# Patient Record
Sex: MALE | Race: BLACK OR AFRICAN AMERICAN | Employment: OTHER | URBAN - METROPOLITAN AREA
[De-identification: names, ages, dates, MRNs, and addresses within clinical notes are randomized per-mention and may not be internally consistent; named-entity substitution may affect disease eponyms.]

---

## 2017-02-09 ENCOUNTER — HOSPITAL ENCOUNTER (EMERGENCY)
Age: 67
Discharge: PSYCHIATRIC OR SUBSTANCE USE FACILITY | End: 2017-02-10
Attending: EMERGENCY MEDICINE
Payer: MEDICARE

## 2017-02-09 ENCOUNTER — APPOINTMENT (OUTPATIENT)
Dept: GENERAL RADIOLOGY | Age: 67
End: 2017-02-09
Attending: NURSE PRACTITIONER
Payer: MEDICARE

## 2017-02-09 DIAGNOSIS — J20.9 ACUTE BRONCHITIS WITH BRONCHOSPASM: ICD-10-CM

## 2017-02-09 DIAGNOSIS — N28.9 RENAL INSUFFICIENCY: ICD-10-CM

## 2017-02-09 DIAGNOSIS — R45.851 SUICIDAL IDEATION: ICD-10-CM

## 2017-02-09 DIAGNOSIS — F10.929 ACUTE ALCOHOL INTOXICATION, WITH UNSPECIFIED COMPLICATION (HCC): Primary | ICD-10-CM

## 2017-02-09 DIAGNOSIS — F14.10 COCAINE ABUSE (HCC): ICD-10-CM

## 2017-02-09 DIAGNOSIS — N39.0 URINARY TRACT INFECTION, SITE UNSPECIFIED: ICD-10-CM

## 2017-02-09 PROBLEM — I10 ESSENTIAL HYPERTENSION: Status: ACTIVE | Noted: 2017-02-09

## 2017-02-09 PROBLEM — E55.9 VITAMIN D DEFICIENCY: Status: ACTIVE | Noted: 2017-02-09

## 2017-02-09 LAB
AMPHET UR QL SCN: NEGATIVE
ANION GAP BLD CALC-SCNC: 11 MMOL/L (ref 3–18)
APAP SERPL-MCNC: <2 UG/ML (ref 10–30)
APPEARANCE UR: CLEAR
BACTERIA URNS QL MICRO: ABNORMAL /HPF
BARBITURATES UR QL SCN: NEGATIVE
BASOPHILS # BLD AUTO: 0 K/UL (ref 0–0.06)
BASOPHILS # BLD: 0 % (ref 0–2)
BENZODIAZ UR QL: NEGATIVE
BILIRUB UR QL: NEGATIVE
BUN SERPL-MCNC: 12 MG/DL (ref 7–18)
BUN/CREAT SERPL: 9 (ref 12–20)
CALCIUM SERPL-MCNC: 8.5 MG/DL (ref 8.5–10.1)
CANNABINOIDS UR QL SCN: NEGATIVE
CHLORIDE SERPL-SCNC: 106 MMOL/L (ref 100–108)
CO2 SERPL-SCNC: 25 MMOL/L (ref 21–32)
COCAINE UR QL SCN: POSITIVE
COLOR UR: ABNORMAL
CREAT SERPL-MCNC: 1.39 MG/DL (ref 0.6–1.3)
DIFFERENTIAL METHOD BLD: ABNORMAL
EOSINOPHIL # BLD: 0.3 K/UL (ref 0–0.4)
EOSINOPHIL NFR BLD: 3 % (ref 0–5)
EPITH CASTS URNS QL MICRO: ABNORMAL /LPF (ref 0–5)
ERYTHROCYTE [DISTWIDTH] IN BLOOD BY AUTOMATED COUNT: 15.7 % (ref 11.6–14.5)
ETHANOL SERPL-MCNC: 119 MG/DL (ref 0–3)
GLUCOSE SERPL-MCNC: 85 MG/DL (ref 74–99)
GLUCOSE UR STRIP.AUTO-MCNC: NEGATIVE MG/DL
HCT VFR BLD AUTO: 46.6 % (ref 36–48)
HDSCOM,HDSCOM: ABNORMAL
HGB BLD-MCNC: 15.5 G/DL (ref 13–16)
HGB UR QL STRIP: NEGATIVE
KETONES UR QL STRIP.AUTO: NEGATIVE MG/DL
LEUKOCYTE ESTERASE UR QL STRIP.AUTO: ABNORMAL
LYMPHOCYTES # BLD AUTO: 24 % (ref 21–52)
LYMPHOCYTES # BLD: 2 K/UL (ref 0.9–3.6)
MCH RBC QN AUTO: 29.2 PG (ref 24–34)
MCHC RBC AUTO-ENTMCNC: 33.3 G/DL (ref 31–37)
MCV RBC AUTO: 87.9 FL (ref 74–97)
METHADONE UR QL: NEGATIVE
MONOCYTES # BLD: 0.6 K/UL (ref 0.05–1.2)
MONOCYTES NFR BLD AUTO: 8 % (ref 3–10)
MUCOUS THREADS URNS QL MICRO: ABNORMAL /LPF
NEUTS SEG # BLD: 5.3 K/UL (ref 1.8–8)
NEUTS SEG NFR BLD AUTO: 65 % (ref 40–73)
NITRITE UR QL STRIP.AUTO: NEGATIVE
OPIATES UR QL: NEGATIVE
PCP UR QL: NEGATIVE
PH UR STRIP: 5 [PH] (ref 5–8)
PLATELET # BLD AUTO: 169 K/UL (ref 135–420)
PMV BLD AUTO: 9.7 FL (ref 9.2–11.8)
POTASSIUM SERPL-SCNC: 4 MMOL/L (ref 3.5–5.5)
PROT UR STRIP-MCNC: NEGATIVE MG/DL
RBC # BLD AUTO: 5.3 M/UL (ref 4.7–5.5)
RBC #/AREA URNS HPF: 0 /HPF (ref 0–5)
SALICYLATES SERPL-MCNC: <2.8 MG/DL (ref 2.8–20)
SODIUM SERPL-SCNC: 142 MMOL/L (ref 136–145)
SP GR UR REFRACTOMETRY: 1.03 (ref 1–1.03)
UROBILINOGEN UR QL STRIP.AUTO: 1 EU/DL (ref 0.2–1)
WBC # BLD AUTO: 8.2 K/UL (ref 4.6–13.2)
WBC URNS QL MICRO: ABNORMAL /HPF (ref 0–4)

## 2017-02-09 PROCEDURE — 99285 EMERGENCY DEPT VISIT HI MDM: CPT

## 2017-02-09 PROCEDURE — 71020 XR CHEST PA LAT: CPT

## 2017-02-09 PROCEDURE — 80307 DRUG TEST PRSMV CHEM ANLYZR: CPT | Performed by: EMERGENCY MEDICINE

## 2017-02-09 PROCEDURE — 87086 URINE CULTURE/COLONY COUNT: CPT | Performed by: PHYSICIAN ASSISTANT

## 2017-02-09 PROCEDURE — 85025 COMPLETE CBC W/AUTO DIFF WBC: CPT | Performed by: EMERGENCY MEDICINE

## 2017-02-09 PROCEDURE — 87186 SC STD MICRODIL/AGAR DIL: CPT | Performed by: PHYSICIAN ASSISTANT

## 2017-02-09 PROCEDURE — 74011250637 HC RX REV CODE- 250/637: Performed by: NURSE PRACTITIONER

## 2017-02-09 PROCEDURE — 94640 AIRWAY INHALATION TREATMENT: CPT

## 2017-02-09 PROCEDURE — 81001 URINALYSIS AUTO W/SCOPE: CPT | Performed by: EMERGENCY MEDICINE

## 2017-02-09 PROCEDURE — 80048 BASIC METABOLIC PNL TOTAL CA: CPT | Performed by: EMERGENCY MEDICINE

## 2017-02-09 PROCEDURE — 87077 CULTURE AEROBIC IDENTIFY: CPT | Performed by: PHYSICIAN ASSISTANT

## 2017-02-09 RX ORDER — IPRATROPIUM BROMIDE AND ALBUTEROL SULFATE 2.5; .5 MG/3ML; MG/3ML
3 SOLUTION RESPIRATORY (INHALATION)
Status: DISPENSED | OUTPATIENT
Start: 2017-02-09 | End: 2017-02-10

## 2017-02-09 RX ORDER — ARIPIPRAZOLE 20 MG/1
0.5 TABLET ORAL DAILY
COMMUNITY
Start: 2017-02-01 | End: 2017-02-21

## 2017-02-09 RX ORDER — LEVOFLOXACIN 500 MG/1
500 TABLET, FILM COATED ORAL
Status: COMPLETED | OUTPATIENT
Start: 2017-02-09 | End: 2017-02-09

## 2017-02-09 RX ORDER — BUPROPION HYDROCHLORIDE 150 MG/1
1 TABLET, EXTENDED RELEASE ORAL 2 TIMES DAILY
COMMUNITY
Start: 2016-11-02 | End: 2017-02-21

## 2017-02-09 RX ORDER — HYDROXYZINE PAMOATE 25 MG/1
1 CAPSULE ORAL 2 TIMES DAILY
COMMUNITY
Start: 2017-02-01 | End: 2017-02-21

## 2017-02-09 RX ORDER — IPRATROPIUM BROMIDE AND ALBUTEROL SULFATE 2.5; .5 MG/3ML; MG/3ML
SOLUTION RESPIRATORY (INHALATION)
Status: DISPENSED
Start: 2017-02-09 | End: 2017-02-10

## 2017-02-09 RX ORDER — OMEPRAZOLE 20 MG/1
1 CAPSULE, DELAYED RELEASE ORAL 2 TIMES DAILY
COMMUNITY
Start: 2017-02-01

## 2017-02-09 RX ORDER — DOCUSATE SODIUM 100 MG
1 CAPSULE ORAL DAILY
COMMUNITY
Start: 2017-02-01 | End: 2021-12-12

## 2017-02-09 RX ORDER — TIOTROPIUM BROMIDE 18 UG/1
1 CAPSULE ORAL; RESPIRATORY (INHALATION) DAILY
COMMUNITY
Start: 2017-02-01

## 2017-02-09 RX ADMIN — LEVOFLOXACIN 500 MG: 500 TABLET, FILM COATED ORAL at 20:06

## 2017-02-09 NOTE — CONSULTS
Name: Irma Bruner  Date: 17  Time: 4:35p  : 1950    Chief Complaint: SI with plan to jump in front of traffic    History of Present Illness: 76 y/o male with a h/o depression and crack cocaine abuse who presents with increased depression and SI with plan in the context of multiple psychosocial stressors including the deaths of multiple people close to him, becoming homeless, and crack cocaine abuse. On exam, patient is hopeless, helpless, and suicidal.     SI/HI/Self harm: no h/o attempt    Violence: denied    Access to weapons: denied    Legal: h/o jailed for assault - patient denies    Psychiatric History/Treatment History:  3-4 prior admissions for SI    Drug/Alcohol History: crack cocaine - every day    Medical History: COPD, emphysema    Medications & Freq: off meds     Allergies: Shellfish    Family Psych History/History of suicide: denied    Social History: homeless    Employment: retired   Education: 9th - couldnt comprehend   Stressors: severe - homeless, substance abuse, medical    Mental Status Exam:   Appearance and attire: disheveled  Attitude and behavior: cooperative  Speech: raspy  Affect and mood: depressed, hopeless, helpless  Association and thought processes: organized  Thought content: SI with plan to jump in front of traffic, denies HI, no delusions   Perception: AH - mumbled voices  Sensorium, memory, and orientation: awake and alert  Intellectual functioning: average  Insight and judgment: impaired    Impression/Risk Assessment: 76 y/o male with a h/o depression and crack cocaine abuse who presents with increased depression and SI with plan in the context of multiple psychosocial stressors including the deaths of multiple people close to him, becoming homeless, and crack cocaine abuse. Patient requires inpatient psych admission for safety and stabilization.     Diagnosis: MDD severe recurrent with psychotic features; cocaine use disorder, severe    Treatment Recommendations: voluntary inpatient psych     Pharmacological: confirm and restart home meds    Therapy: supportive, substance abuse focused    Level of Care: inpatient

## 2017-02-09 NOTE — ED PROVIDER NOTES
HPI Comments: Cecilia Hamm is a 77year old male who presents to the ED with a c/o suicidal ideation. Pt states he has lost a lot of family members over the past three years. Reports thoughts of over dosing as well as stepping out in front of a moving train or automobile. Pt has not had previous suicidal attempts. He has a psychiatric diagnosis of depression, and has been in rehab for crack cocaine use in the past.  He would like to be referred to a detox facility for cocaine. He is homeless, has not had anythig to eat or drink in the past 3 days. He has no medication, but is supposed to be taking Wellbutrin and Pradaxa (from a DVT/PE years ago). He hears auditory hallucinations from dead family members. He also has two day history of cough with brown sputum and a subjective fever. Patient is a 77 y.o. male presenting with suicidal ideation. The history is provided by the patient. History limited by: no communication barrier. Suicidal   This is a new problem. The current episode started more than 1 week ago. The problem has not changed (suicidal ideation for the past three years, worsening over the past year.  ) since onset. There was no focality noted. Pertinent negatives include no shortness of breath, no chest pain, no vomiting, no headaches and no nausea. Past Medical History:   Diagnosis Date    Alcohol abuse     Anxiety     Asthma     Bronchitis     Chronic deep vein thrombosis (DVT) ([facility] [address] 07/2013 07/2013, 12/2014 ([facility])    Cocaine abuse     COPD     Depression     GERD (gastroesophageal reflux disease)     Hyperlipidemia     Hypertension     Major depression     Mild diastolic dysfunction     NEIL on CPAP     Renal insufficiency     Suicidal ideation     Vitamin D deficiency        History reviewed. No pertinent past surgical history. History reviewed. No pertinent family history.     Social History     Social History    Marital status: SINGLE     Spouse name: N/A    Number of children: N/A    Years of education: N/A     Occupational History    Not on file. Social History Main Topics    Smoking status: Former Smoker    Smokeless tobacco: Not on file      Comment: States that he quit 12 years ago.  Alcohol use Yes    Drug use: Yes     Special: Cocaine    Sexual activity: Not on file     Other Topics Concern    Not on file     Social History Narrative         ALLERGIES: Shellfish derived    Review of Systems   Constitutional: Negative for chills. Subjective fever     HENT: Negative for ear pain and sore throat. Eyes: Negative for pain and discharge. Respiratory: Positive for cough. Negative for chest tightness, shortness of breath and wheezing. Cardiovascular: Negative for chest pain, palpitations and leg swelling. Gastrointestinal: Negative for abdominal pain, diarrhea, nausea and vomiting. Genitourinary: Negative for dysuria, frequency and urgency. Musculoskeletal: Negative for arthralgias and joint swelling. Skin: Negative for color change, pallor, rash and wound. Neurological: Negative for dizziness, syncope, weakness, light-headedness and headaches. Psychiatric/Behavioral: Positive for dysphoric mood and suicidal ideas. Negative for behavioral problems, hallucinations, self-injury and sleep disturbance. The patient is not nervous/anxious. Vitals:    02/09/17 1253   BP: 109/73   Pulse: 98   Resp: 18   Temp: 98.5 °F (36.9 °C)   SpO2: 97%   Weight: 89.8 kg (198 lb)            Physical Exam   Constitutional: He is oriented to person, place, and time. He appears well-developed and well-nourished. No distress. HENT:   Head: Normocephalic and atraumatic. Right Ear: Hearing, tympanic membrane, external ear and ear canal normal.   Left Ear: Hearing, tympanic membrane, external ear and ear canal normal.   Nose: Nose normal. No mucosal edema, rhinorrhea or sinus tenderness.  Right sinus exhibits no maxillary sinus tenderness and no frontal sinus tenderness. Left sinus exhibits no maxillary sinus tenderness and no frontal sinus tenderness. Mouth/Throat: Uvula is midline, oropharynx is clear and moist and mucous membranes are normal. No trismus in the jaw. No uvula swelling. No oropharyngeal exudate, posterior oropharyngeal edema, posterior oropharyngeal erythema or tonsillar abscesses. Eyes: Conjunctivae and EOM are normal. Pupils are equal, round, and reactive to light. Right eye exhibits no discharge. Left eye exhibits no discharge. No scleral icterus. Neck: Trachea normal, normal range of motion, full passive range of motion without pain and phonation normal. Neck supple. No tracheal tenderness, no spinous process tenderness and no muscular tenderness present. No rigidity. No tracheal deviation, no edema, no erythema and normal range of motion present. No thyroid mass and no thyromegaly present. Cardiovascular: Normal rate, regular rhythm and normal heart sounds. Exam reveals no gallop and no friction rub. No murmur heard. Pulmonary/Chest: Effort normal. No accessory muscle usage or stridor. No tachypnea. No respiratory distress. He has no decreased breath sounds. He has wheezes (expiratory). He has no rhonchi. He has no rales. He exhibits no tenderness. Pt does not appear SOB. Abdominal: Soft. Bowel sounds are normal. There is no tenderness. There is no rigidity, no rebound, no guarding, no CVA tenderness, no tenderness at McBurney's point and negative Feldman's sign. Genitourinary:   Genitourinary Comments: NE   Musculoskeletal: Normal range of motion. Lymphadenopathy:     He has no cervical adenopathy. Neurological: He is alert and oriented to person, place, and time. He has normal strength. He is not disoriented. No cranial nerve deficit or sensory deficit. He exhibits normal muscle tone. Coordination normal.   MS 5/5 BUE/BLE   Skin: Skin is warm, dry and intact. No rash noted.  He is not diaphoretic. No cyanosis. No pallor. Psychiatric: He has a normal mood and affect. His behavior is normal.   Pt thought process is askew in that he wants to harm himself. Nursing note and vitals reviewed. MDM  Number of Diagnoses or Management Options  Acute alcohol intoxication, with unspecified complication ([address]): new and requires workup  Acute bronchitis with bronchospasm: new and requires workup  Cocaine abuse: new and requires workup  Renal insufficiency: new and requires workup  Suicidal ideation: new and requires workup  Urinary tract infection, site unspecified: new and requires workup  Diagnosis management comments: Discussed with NP Kilo Mahajan  concerning patient Jn Castrejon, standard discussion of reason for visit, HPI, ROS, PE, and current results available. Recommendation for awaiting Tele-Psychiatry Recommendations. I agree to assume the care of this patient at this time as the previous provider's shift has come to a close. I have introduced myself to the patient, explained that I was the provider who would be following the remaining course of their stay in the Emergency Department. I subsequently reaffirmed the history by the preceding provider, I have interviewed, and reexamined the patient. SHERRIE Wayne  February 9, 2017  6 PM     Reviewed CXR results from 4:10 PM:   Parenchymal findings of emphysema, without superimposed acute  radiographic abnormality    6:30 PM: Tele-Psychiatrist Dr. Dagoberto Whittington (reviewed his consult note from 6:18 PM) recommends voluntary admission. Consulted with Dr. Guillermo Olmstead ([id number]) concerning patient Jn Castrejon, standard discussion of reason for visit, HPI, ROS, PE, and current results available. He informs me that currently the [place] has no inpatient beds and are on Diversion.     Rosalba Wayne  February 9, 2017  7:30 PM     Consulted with [facility] Crisis RN Chantelle Ambrosio concerning patient Jn Castrejon, standard discussion of reason for visit, HPI, ROS, PE, and current results available. Currently no beds, but will review case, and await bed availability. Vignesh Matta Alarazama  February 9, 2017  8:10 PM     Progress Note    Patient: Rafa Pretty  MRN: [id number]  Date: 2/10/2017     Age:  77 y.o.,      Sex: male    YOB: 1950      Rafa Pretty is improved, resting quietly and comfortably, NAD, will continue to monitor closely. Rosalba Urban  February 9, 2017  10 PM    Progress Note    Patient: Rafa Pretty  MRN: [id number]  Date: 2/10/2017     Age:  77 y.o.,      Sex: male    YOB: 1950      Rafa Pretty is improved, resting quietly and comfortably, NAD, will continue to monitor closely. Rechecked VS, patient tachy. Known ETOH abuse, Cocaine Abuse. Ativan 1 mg PO ordered. Lungs: Decreased BS, Scattered EEW bilaterally. Prednisone 60 mg PO + Levaquin 750 mg PO + Duo-Nebs x 2 ordered. SHERRIE Urban  February 10, 2017  1:00 AM    3:42 AM  Pt has been re-examined after nebulizer treatments and states that they are feeling better and have no new complaints. On auscultation, wheezing is significantly improved. Laboratory tests, medications, x-rays, diagnosis, follow up plan and return instructions have been reviewed and discussed with the patient and/or family. Pt and/or family have had the opportunity to ask questions about their care. Patient and/or family express understanding and agreement with care plan, including oral steroids, nebulizer or inhaler use, follow up and return instructions. Patient and/or family agree to return in 24 hours if their symptoms are not improving or immediately if they have any change in their condition including worsening wheezing or any signs of increasing work of breathing. Ambulated by [facility], maintained Sp02>96% while ambulating. Feeling much better.      Discussed with Dr. Richmond Vergara (EP) concerning patient Liliana Palm, standard discussion of reason for visit, HPI, ROS, PE, and current results available. Recommendation for Examination of the patient, Checking back w/ [facility] to find out status of Bed Placement in AM. He agrees to assume the care of this patient at this time as the previous provider's shift has come to a close. SHERRIE Bernard  February 10, 2017  3 AM     PROGRESS NOTE:  [facility]. Sunita Blount NP  11:25 AM           Amount and/or Complexity of Data Reviewed  Clinical lab tests: ordered and reviewed  Tests in the radiology section of CPT®: ordered and reviewed  Tests in the medicine section of CPT®: reviewed and ordered  Discussion of test results with the performing providers: yes  Decide to obtain previous medical records or to obtain history from someone other than the patient: yes  Review and summarize past medical records: yes  Discuss the patient with other providers: yes  Independent visualization of images, tracings, or specimens: yes    Risk of Complications, Morbidity, and/or Mortality  Presenting problems: moderate  Diagnostic procedures: moderate  Management options: moderate    Patient Progress  Patient progress: stable      Procedures    PROGRESS NOTE:   Care of the Pt turned over to LISA Clayton PA-C at time of shift change. SHERRIE Bernard  6:46 PM    Diagnosis:   1. Acute alcohol intoxication, with unspecified complication ([address]    2. Suicidal ideation    3. Cocaine abuse    4. Renal insufficiency    5. Urinary tract infection, site unspecified        Disposition: TRANSFER PENDING BED PLACEMENT. Follow-up Information     None          Patient's Medications   Start Taking    No medications on file   Continue Taking    ABILIFY 20 MG TABLET    Take 0.5 Tabs by mouth daily. 2/1/17, QTY#15 Tabs, 30 Days.  Dr. Palmer Bolds    ALBUTEROL (PROVENTIL HFA, VENTOLIN HFA, PROAIR HFA) 90 MCG/ACTUATION INHALER    Take 2 Puffs by inhalation every six (6) hours as needed for Wheezing. ALBUTEROL-IPRATROPIUM (DUONEB) 2.5 MG-0.5 MG/3 ML NEBU    3 mL by Nebulization route every six (6) hours as needed. BUDESONIDE-FORMOTEROL (SYMBICORT) 160-4.5 MCG/ACTUATION HFA INHALER    Take 2 Puffs by inhalation two (2) times a day. BUPROPION SR (WELLBUTRIN SR) 150 MG SR TABLET    Take 1 Tab by mouth two (2) times a day. 11/2/16, QTY#60 Tabs, 30 Days. Dr. Brina Vela    DABIGATRAN ETEXILATE (PRADAXA) 150 MG CAPSULE    Take 150 mg by mouth every twelve (12) hours. HYDROXYZINE PAMOATE (VISTARIL) 25 MG CAPSULE    Take 1 Cap by mouth two (2) times a day. 2/1/17, QTY#60 Caps, 30 Days. Dr. Brina Vela    OMEPRAZOLE (PRILOSEC) 20 MG CAPSULE    Take 1 Cap by mouth two (2) times a day. 2/1/17, QTY#60, 30 Days. 2/1/17 Dr. Genie Richardson 18 MCG INHALATION CAPSULE    Take 1 Cap by inhalation daily. 2/1/17 Dr. Karina Link 100 MG CAPSULE    Take 1 Cap by mouth daily. 2/1/17, QTY#100 Caps, 30 Days. Dr. Brina Vela   These Medications have changed    No medications on file   Stop Taking    BUPROPION (WELLBUTRIN) 75 MG TABLET    Take 150 mg by mouth two (2) times a day. LEVOFLOXACIN (LEVAQUIN) 750 MG TABLET    Take 1 Tab by mouth daily.     PREDNISONE (DELTASONE) 20 MG TABLET    Take 3 tablets daily x 3 days, 2 tablets daily x 3 days, 1 tablet daily x 3 days then stop       Recent Results (from the past 24 hour(s))   URINALYSIS W/ RFLX MICROSCOPIC    Collection Time: 02/09/17 12:50 PM   Result Value Ref Range    Color DARK YELLOW      Appearance CLEAR      Specific gravity 1.029 1.005 - 1.030      pH (UA) 5.0 5.0 - 8.0      Protein NEGATIVE  NEG mg/dL    Glucose NEGATIVE  NEG mg/dL    Ketone NEGATIVE  NEG mg/dL    Bilirubin NEGATIVE  NEG      Blood NEGATIVE  NEG      Urobilinogen 1.0 0.2 - 1.0 EU/dL    Nitrites NEGATIVE  NEG      Leukocyte Esterase LARGE (A) NEG     DRUG SCREEN, URINE    Collection Time: 02/09/17 12:50 PM   Result Value Ref Range    BENZODIAZEPINE NEGATIVE  NEG      BARBITURATES NEGATIVE  NEG      THC (TH-CANNABINOL) NEGATIVE  NEG      OPIATES NEGATIVE  NEG      PCP(PHENCYCLIDINE) NEGATIVE  NEG      COCAINE POSITIVE (A) NEG      AMPHETAMINE NEGATIVE  NEG      METHADONE NEGATIVE       HDSCOM (NOTE)    URINE MICROSCOPIC ONLY    Collection Time: 02/09/17 12:50 PM   Result Value Ref Range    WBC TOO NUMEROUS TO COUNT 0 - 4 /hpf    RBC 0 0 - 5 /hpf    Epithelial cells FEW 0 - 5 /lpf    Bacteria 1+ (A) NEG /hpf    Mucus 3+ (A) NEG /lpf   CBC WITH AUTOMATED DIFF    Collection Time: 02/09/17  1:10 PM   Result Value Ref Range    WBC 8.2 4.6 - 13.2 K/uL    RBC 5.30 4.70 - 5.50 M/uL    HGB 15.5 13.0 - 16.0 g/dL    HCT 46.6 36.0 - 48.0 %    MCV 87.9 74.0 - 97.0 FL    MCH 29.2 24.0 - 34.0 PG    MCHC 33.3 31.0 - 37.0 g/dL    RDW 15.7 (H) 11.6 - 14.5 %    PLATELET 764 285 - 480 K/uL    MPV 9.7 9.2 - 11.8 FL    NEUTROPHILS 65 40 - 73 %    LYMPHOCYTES 24 21 - 52 %    MONOCYTES 8 3 - 10 %    EOSINOPHILS 3 0 - 5 %    BASOPHILS 0 0 - 2 %    ABS. NEUTROPHILS 5.3 1.8 - 8.0 K/UL    ABS. LYMPHOCYTES 2.0 0.9 - 3.6 K/UL    ABS. MONOCYTES 0.6 0.05 - 1.2 K/UL    ABS. EOSINOPHILS 0.3 0.0 - 0.4 K/UL    ABS.  BASOPHILS 0.0 0.0 - 0.06 K/UL    DF AUTOMATED     METABOLIC PANEL, BASIC    Collection Time: 02/09/17  1:10 PM   Result Value Ref Range    Sodium 142 136 - 145 mmol/L    Potassium 4.0 3.5 - 5.5 mmol/L    Chloride 106 100 - 108 mmol/L    CO2 25 21 - 32 mmol/L    Anion gap 11 3.0 - 18 mmol/L    Glucose 85 74 - 99 mg/dL    BUN 12 7.0 - 18 MG/DL    Creatinine 1.39 (H) 0.6 - 1.3 MG/DL    BUN/Creatinine ratio 9 (L) 12 - 20      GFR est AA >60 >60 ml/min/1.73m2    GFR est non-AA 51 (L) >60 ml/min/1.73m2    Calcium 8.5 8.5 - 44.6 MG/DL   SALICYLATE    Collection Time: 02/09/17  1:10 PM   Result Value Ref Range    SALICYLATE <4.2 (L) 2.8 - 20.0 MG/DL   ACETAMINOPHEN    Collection Time: 02/09/17  1:10 PM   Result Value Ref Range    ACETAMINOPHEN <2 (L) 10 - 30 ug/mL   ETHYL ALCOHOL    Collection Time: 02/09/17  1:10 PM   Result Value Ref Range    ALCOHOL(ETHYL),SERUM 119 (H) 0 - 3 MG/DL       PROGRESS NOTE:  Assumed care of the PT at this time. He is resting comfortably in his gurney without complaint or request.  Yaya Negron NP  7:33 AM    PROGRESS NOTE:  Pt is resting comfortably in his gurney without request or complaint. Yaya Negron NP  10:10 AM    PROGRESS NOTE:  Pt is resting comfortably in gurney awaiting transfer. Yaya Negron NP  2:21 PM    Diagnosis:   1. Acute alcohol intoxication, with unspecified complication ([address]    2. Suicidal ideation    3. Cocaine abuse    4. Renal insufficiency    5. Urinary tract infection, site unspecified    6. Acute bronchitis with bronchospasm          Disposition:   TRANSFERRED TO [facility]    Follow-up Information     None          Patient's Medications   Start Taking    INHALATIONAL SPACING DEVICE    ALWAYS USE WITH INHALER    LEVOFLOXACIN (LEVAQUIN) 750 MG TABLET    Take 1 Tab by mouth daily for 4 days. Begin taking tomorrow Saturday, 2/11/17 as you received your initial dose while in the ER. LEVOFLOXACIN (LEVAQUIN) 750 MG TABLET    Take 1 Tab by mouth daily. PREDNISONE (DELTASONE) 20 MG TABLET    Begin taking tomorrow Saturday, 2/11/17 as you received your initial dose while in the ER. 3 tabs po every day x 2 days, then 2 tabs po every day x 3 days, then 1 tab po every day x 3 days. Continue Taking    ABILIFY 20 MG TABLET    Take 0.5 Tabs by mouth daily. 2/1/17, QTY#15 Tabs, 30 Days. Dr. Luke Juárez (DUONEB) 2.5 MG-0.5 MG/3 ML NEBU    3 mL by Nebulization route every six (6) hours as needed. BUDESONIDE-FORMOTEROL (SYMBICORT) 160-4.5 MCG/ACTUATION HFA INHALER    Take 2 Puffs by inhalation two (2) times a day. BUPROPION SR (WELLBUTRIN SR) 150 MG SR TABLET    Take 1 Tab by mouth two (2) times a day. 11/2/16, QTY#60 Tabs, 30 Days.  Dr. Yana Tamayo    DABIGATRAN ETEXILATE (PRADAXA) 150 MG CAPSULE Take 150 mg by mouth every twelve (12) hours. HYDROXYZINE PAMOATE (VISTARIL) 25 MG CAPSULE    Take 1 Cap by mouth two (2) times a day. 2/1/17, QTY#60 Caps, 30 Days. Dr. Zaheer Mcadams    OMEPRAZOLE (PRILOSEC) 20 MG CAPSULE    Take 1 Cap by mouth two (2) times a day. 2/1/17, QTY#60, 30 Days. 2/1/17 Dr. Kelley Hernandez 18 MCG INHALATION CAPSULE    Take 1 Cap by inhalation daily. 2/1/17 Dr. Thiago Lorenzo 100 MG CAPSULE    Take 1 Cap by mouth daily. 2/1/17, QTY#100 Caps, 30 Days. Dr. Zaheer Mcadams   These Medications have changed    Modified Medication Previous Medication    ALBUTEROL (PROVENTIL HFA, VENTOLIN HFA, PROAIR HFA) 90 MCG/ACTUATION INHALER albuterol (PROVENTIL HFA, VENTOLIN HFA, PROAIR HFA) 90 mcg/actuation inhaler       Take 2 Puffs by inhalation every four (4) hours as needed for Wheezing or Shortness of Breath (Cough). Take 2 Puffs by inhalation every six (6) hours as needed for Wheezing. Stop Taking    BUPROPION (WELLBUTRIN) 75 MG TABLET    Take 150 mg by mouth two (2) times a day. LEVOFLOXACIN (LEVAQUIN) 750 MG TABLET    Take 1 Tab by mouth daily.     PREDNISONE (DELTASONE) 20 MG TABLET    Take 3 tablets daily x 3 days, 2 tablets daily x 3 days, 1 tablet daily x 3 days then stop

## 2017-02-09 NOTE — ED TRIAGE NOTES
Patient has been sleeping outside for last several days. recent discharge from [place] for S/I. Called [employer] and said wanted a ride to hospital . [place] will be harmful to self. Voluntary. Says has been using crack cocaine and ETOH. Wants to run out in front of bus.says tired of living this way.

## 2017-02-09 NOTE — ED NOTES
I performed a brief evaluation, including history and physical, of the patient here in triage and I have determined that pt will need further treatment and evaluation from the main side ER physician. I have placed initial orders to help in expediting patients care.      February 09, 2017 at 6:17 PM - Delroy Olivas MD        Visit Vitals    /73 (BP 1 Location: Right arm, BP Patient Position: At rest)    Pulse 98    Temp 98.5 °F (36.9 °C)    Resp 18    Wt 89.8 kg (198 lb)    SpO2 97%    BMI 29.24 kg/m2          Patient called 911 and stated he was suicidal and the police came to bring patient to the ED  LL MD

## 2017-02-10 ENCOUNTER — HOSPITAL ENCOUNTER (INPATIENT)
Age: 67
LOS: 11 days | Discharge: NURSING, REHAB OR LONG-TERM CARE | DRG: 885 | End: 2017-02-21
Attending: PSYCHIATRY & NEUROLOGY | Admitting: PSYCHIATRY & NEUROLOGY
Payer: MEDICARE

## 2017-02-10 VITALS
BODY MASS INDEX: 29.24 KG/M2 | TEMPERATURE: 98.1 F | WEIGHT: 198 LBS | SYSTOLIC BLOOD PRESSURE: 113 MMHG | RESPIRATION RATE: 18 BRPM | HEART RATE: 97 BPM | OXYGEN SATURATION: 99 % | DIASTOLIC BLOOD PRESSURE: 73 MMHG

## 2017-02-10 PROBLEM — F33.2 MAJOR DEPRESSIVE DISORDER, RECURRENT EPISODE, SEVERE (HCC): Status: ACTIVE | Noted: 2017-02-10

## 2017-02-10 PROCEDURE — 74011636637 HC RX REV CODE- 636/637: Performed by: PSYCHIATRY & NEUROLOGY

## 2017-02-10 PROCEDURE — 74011250637 HC RX REV CODE- 250/637: Performed by: NURSE PRACTITIONER

## 2017-02-10 PROCEDURE — 74011250637 HC RX REV CODE- 250/637: Performed by: PHYSICIAN ASSISTANT

## 2017-02-10 PROCEDURE — 74011636637 HC RX REV CODE- 636/637: Performed by: PHYSICIAN ASSISTANT

## 2017-02-10 PROCEDURE — 65220000003 HC RM SEMIPRIVATE PSYCH

## 2017-02-10 PROCEDURE — 74011250637 HC RX REV CODE- 250/637: Performed by: PSYCHIATRY & NEUROLOGY

## 2017-02-10 PROCEDURE — 74011000250 HC RX REV CODE- 250: Performed by: PHYSICIAN ASSISTANT

## 2017-02-10 PROCEDURE — 77030013140 HC MSK NEB VYRM -A

## 2017-02-10 PROCEDURE — A9270 NON-COVERED ITEM OR SERVICE: HCPCS | Performed by: PHYSICIAN ASSISTANT

## 2017-02-10 RX ORDER — PROMETHAZINE HYDROCHLORIDE 25 MG/ML
25 INJECTION, SOLUTION INTRAMUSCULAR; INTRAVENOUS
Status: DISCONTINUED | OUTPATIENT
Start: 2017-02-10 | End: 2017-02-21 | Stop reason: HOSPADM

## 2017-02-10 RX ORDER — HALOPERIDOL 5 MG/ML
5 INJECTION INTRAMUSCULAR
Status: DISCONTINUED | OUTPATIENT
Start: 2017-02-10 | End: 2017-02-21 | Stop reason: HOSPADM

## 2017-02-10 RX ORDER — ALBUTEROL SULFATE 90 UG/1
2 AEROSOL, METERED RESPIRATORY (INHALATION)
Status: DISCONTINUED | OUTPATIENT
Start: 2017-02-10 | End: 2017-02-10 | Stop reason: CLARIF

## 2017-02-10 RX ORDER — PROMETHAZINE HYDROCHLORIDE 25 MG/1
25 SUPPOSITORY RECTAL
Status: DISCONTINUED | OUTPATIENT
Start: 2017-02-10 | End: 2017-02-21 | Stop reason: HOSPADM

## 2017-02-10 RX ORDER — LORAZEPAM 1 MG/1
1-2 TABLET ORAL
Status: DISCONTINUED | OUTPATIENT
Start: 2017-02-10 | End: 2017-02-21 | Stop reason: HOSPADM

## 2017-02-10 RX ORDER — PREDNISONE 20 MG/1
60 TABLET ORAL
Status: COMPLETED | OUTPATIENT
Start: 2017-02-10 | End: 2017-02-10

## 2017-02-10 RX ORDER — DABIGATRAN ETEXILATE 150 MG/1
150 CAPSULE ORAL 2 TIMES DAILY
Status: DISCONTINUED | OUTPATIENT
Start: 2017-02-10 | End: 2017-02-10 | Stop reason: HOSPADM

## 2017-02-10 RX ORDER — BUPROPION HYDROCHLORIDE 150 MG/1
150 TABLET, EXTENDED RELEASE ORAL 2 TIMES DAILY
Status: DISCONTINUED | OUTPATIENT
Start: 2017-02-10 | End: 2017-02-10 | Stop reason: HOSPADM

## 2017-02-10 RX ORDER — IPRATROPIUM BROMIDE AND ALBUTEROL SULFATE 2.5; .5 MG/3ML; MG/3ML
3 SOLUTION RESPIRATORY (INHALATION) ONCE
Status: COMPLETED | OUTPATIENT
Start: 2017-02-10 | End: 2017-02-10

## 2017-02-10 RX ORDER — LORAZEPAM 1 MG/1
1 TABLET ORAL
Status: COMPLETED | OUTPATIENT
Start: 2017-02-10 | End: 2017-02-10

## 2017-02-10 RX ORDER — PREDNISONE 20 MG/1
40 TABLET ORAL
Status: COMPLETED | OUTPATIENT
Start: 2017-02-12 | End: 2017-02-15

## 2017-02-10 RX ORDER — TRAZODONE HYDROCHLORIDE 50 MG/1
50 TABLET ORAL
Status: DISCONTINUED | OUTPATIENT
Start: 2017-02-10 | End: 2017-02-21 | Stop reason: HOSPADM

## 2017-02-10 RX ORDER — IBUPROFEN 600 MG/1
600 TABLET ORAL
Status: DISCONTINUED | OUTPATIENT
Start: 2017-02-10 | End: 2017-02-11

## 2017-02-10 RX ORDER — ALBUTEROL SULFATE 0.83 MG/ML
2.5 SOLUTION RESPIRATORY (INHALATION)
Status: DISCONTINUED | OUTPATIENT
Start: 2017-02-10 | End: 2017-02-10 | Stop reason: HOSPADM

## 2017-02-10 RX ORDER — PREDNISONE 20 MG/1
TABLET ORAL
Qty: 15 TAB | Refills: 0 | Status: SHIPPED | OUTPATIENT
Start: 2017-02-11 | End: 2017-02-21

## 2017-02-10 RX ORDER — LEVOFLOXACIN 750 MG/1
750 TABLET ORAL
Status: COMPLETED | OUTPATIENT
Start: 2017-02-11 | End: 2017-02-14

## 2017-02-10 RX ORDER — ARIPIPRAZOLE 10 MG/1
10 TABLET ORAL DAILY
Status: DISCONTINUED | OUTPATIENT
Start: 2017-02-11 | End: 2017-02-15

## 2017-02-10 RX ORDER — LEVOFLOXACIN 750 MG/1
750 TABLET ORAL DAILY
Qty: 4 TAB | Refills: 0 | Status: SHIPPED | OUTPATIENT
Start: 2017-02-11 | End: 2017-02-21

## 2017-02-10 RX ORDER — LEVOFLOXACIN 750 MG/1
750 TABLET ORAL
Status: COMPLETED | OUTPATIENT
Start: 2017-02-10 | End: 2017-02-10

## 2017-02-10 RX ORDER — PANTOPRAZOLE SODIUM 40 MG/1
40 TABLET, DELAYED RELEASE ORAL
Status: DISCONTINUED | OUTPATIENT
Start: 2017-02-10 | End: 2017-02-21 | Stop reason: HOSPADM

## 2017-02-10 RX ORDER — IPRATROPIUM BROMIDE AND ALBUTEROL SULFATE 2.5; .5 MG/3ML; MG/3ML
3 SOLUTION RESPIRATORY (INHALATION)
Status: DISCONTINUED | OUTPATIENT
Start: 2017-02-10 | End: 2017-02-21 | Stop reason: HOSPADM

## 2017-02-10 RX ORDER — CHLORDIAZEPOXIDE HYDROCHLORIDE 25 MG/1
25 CAPSULE, GELATIN COATED ORAL
Status: DISCONTINUED | OUTPATIENT
Start: 2017-02-10 | End: 2017-02-21 | Stop reason: HOSPADM

## 2017-02-10 RX ORDER — LORAZEPAM 2 MG/ML
1-2 INJECTION INTRAMUSCULAR
Status: DISCONTINUED | OUTPATIENT
Start: 2017-02-10 | End: 2017-02-21 | Stop reason: HOSPADM

## 2017-02-10 RX ORDER — PREDNISONE 20 MG/1
60 TABLET ORAL
Status: DISPENSED | OUTPATIENT
Start: 2017-02-10 | End: 2017-02-12

## 2017-02-10 RX ORDER — LEVOFLOXACIN 750 MG/1
750 TABLET ORAL DAILY
Qty: 4 TAB | Refills: 0 | Status: SHIPPED | OUTPATIENT
Start: 2017-02-10 | End: 2017-02-21

## 2017-02-10 RX ORDER — ALBUTEROL SULFATE 90 UG/1
2 AEROSOL, METERED RESPIRATORY (INHALATION)
Qty: 1 INHALER | Refills: 0 | Status: SHIPPED | OUTPATIENT
Start: 2017-02-10

## 2017-02-10 RX ORDER — PREDNISONE 20 MG/1
20 TABLET ORAL
Status: COMPLETED | OUTPATIENT
Start: 2017-02-15 | End: 2017-02-18

## 2017-02-10 RX ORDER — ARIPIPRAZOLE 10 MG/1
10 TABLET ORAL DAILY
Status: DISCONTINUED | OUTPATIENT
Start: 2017-02-10 | End: 2017-02-10 | Stop reason: HOSPADM

## 2017-02-10 RX ORDER — LEVOFLOXACIN 750 MG/1
750 TABLET ORAL DAILY
Status: DISCONTINUED | OUTPATIENT
Start: 2017-02-11 | End: 2017-02-10 | Stop reason: HOSPADM

## 2017-02-10 RX ORDER — HALOPERIDOL 5 MG/1
5 TABLET ORAL
Status: DISCONTINUED | OUTPATIENT
Start: 2017-02-10 | End: 2017-02-21 | Stop reason: HOSPADM

## 2017-02-10 RX ORDER — DOCUSATE SODIUM 100 MG/1
100 CAPSULE, LIQUID FILLED ORAL DAILY
Status: DISCONTINUED | OUTPATIENT
Start: 2017-02-11 | End: 2017-02-21 | Stop reason: HOSPADM

## 2017-02-10 RX ADMIN — PREDNISONE 60 MG: 20 TABLET ORAL at 01:44

## 2017-02-10 RX ADMIN — TIOTROPIUM BROMIDE 18 MCG: 18 CAPSULE ORAL; RESPIRATORY (INHALATION) at 08:30

## 2017-02-10 RX ADMIN — PREDNISONE 60 MG: 20 TABLET ORAL at 19:29

## 2017-02-10 RX ADMIN — PANTOPRAZOLE SODIUM 40 MG: 40 TABLET, DELAYED RELEASE ORAL at 19:30

## 2017-02-10 RX ADMIN — LORAZEPAM 1 MG: 1 TABLET ORAL at 01:44

## 2017-02-10 RX ADMIN — ARIPIPRAZOLE 10 MG: 10 TABLET ORAL at 09:31

## 2017-02-10 RX ADMIN — IPRATROPIUM BROMIDE AND ALBUTEROL SULFATE 3 ML: .5; 3 SOLUTION RESPIRATORY (INHALATION) at 01:44

## 2017-02-10 RX ADMIN — DABIGATRAN ETEXILATE MESYLATE 150 MG: 150 CAPSULE ORAL at 09:29

## 2017-02-10 RX ADMIN — LEVOFLOXACIN 750 MG: 750 TABLET, FILM COATED ORAL at 01:44

## 2017-02-10 RX ADMIN — BUPROPION HYDROCHLORIDE 150 MG: 150 TABLET, FILM COATED, EXTENDED RELEASE ORAL at 09:31

## 2017-02-10 RX ADMIN — TRAZODONE HYDROCHLORIDE 50 MG: 50 TABLET ORAL at 19:30

## 2017-02-10 NOTE — PROGRESS NOTES
Pt accepted by Charla Moore at [facility]. Pt's accepting Psychiatrist is Dr. Mayur Zarate. Bed will be available after 4PM today. Pt is going into room 113, bed 2. Report to be called to [phone].            Tasha Byers RN, BSN, [facility]  ED Outcomes Manager  Thursdays & Fridays   [phone] (phone)  [phone] (pager)

## 2017-02-10 NOTE — PROGRESS NOTES
Albuterol NEB was therapeutically interchanged for Albuterol INH  per the P&T Committee approved Therapeutic Interchanges Policy.     Coby Martinez [facility], Pharmacist  2/10/2017 3:55 AM

## 2017-02-10 NOTE — PROGRESS NOTES
Case discussed with James Fortune at [facility]. James Fortune will review case and notify me of bed availability.           Steph Serrato RN, BSN, [place]  ED Outcomes Manager  Thursdays & Fridays   [phone] (phone)  [phone] (pager)

## 2017-02-10 NOTE — IP AVS SNAPSHOT
[address] [facility] Patient: Jn Castrejon MRN: [id number] LCS:0[birth year]13/7839 You are allergic to the following Allergen Reactions Shellfish Derived Anaphylaxis Recent Documentation Height Weight BMI Smoking Status 1.854 m 87.5 kg 25.46 kg/m2 Former Smoker Emergency Contacts Name Discharge Info Relation Home Work Mobile Joon Magaña  Other Relative [6] [phone] About your hospitalization You were admitted on:  February 10, 2017 You last received care in the:  [facility] ADULT CHEM DEP You were discharged on:  February 21, 2017 Unit phone number:  [phone] Why you were hospitalized Your primary diagnosis was:  Not on File Your diagnoses also included: Major Depressive Disorder, Recurrent Episode, Severe (Hcc) Providers Seen During Your Hospitalizations Provider Role Specialty Primary office phone Noreen He MD Attending Provider Psychiatry [phone] Your Primary Care Physician (PCP) Primary Care Physician Office Phone Office Fax OTHER, PHYS ** None ** ** None ** Follow-up Information Follow up With Details Comments Contact Info [address] [place] [address] Residential Program  30 day Intensive Inpatient Chemical Dependency Program. [phone] Transportation: [address] [facility], [address]: Tues 2/21/17 @ 2pm 
Arrive: Wed 2/22/17 @ 1:15pm  in [place] Confirmation [id number]. Current Discharge Medication List  
  
START taking these medications Dose & Instructions Dispensing Information Comments Morning Noon Evening Bedtime buPROPion  mg XL tablet Commonly known as:  WELLBUTRIN XL  
Replaces:  buPROPion  mg SR tablet Your next dose is: Today, Tomorrow Other:  _________  Dose:  300 mg  
 Take 1 Tab by mouth every morning for 30 days. Indications: ANXIETY WITH DEPRESSION Quantity:  30 Tab Refills:  0  
     
   
   
   
  
 sertraline 50 mg tablet Commonly known as:  ZOLOFT Your next dose is: Today, Tomorrow Other:  _________ Dose:  50 mg Take 1 Tab by mouth daily for 30 days. Indications: ANXIETY WITH DEPRESSION Quantity:  30 Tab Refills:  1 CONTINUE these medications which have CHANGED Dose & Instructions Dispensing Information Comments Morning Noon Evening Bedtime ARIPiprazole 5 mg tablet Commonly known as:  ABILIFY What changed:   
- medication strength 
- how much to take 
- additional instructions Your next dose is: Today, Tomorrow Other:  _________ Dose:  5 mg Take 1 Tab by mouth daily. Indications: DEPRESSION TREATMENT ADJUNCT Quantity:  30 Tab Refills:  1  
     
   
   
   
  
 * PRADAXA 150 mg capsule Generic drug:  dabigatran etexilate What changed:  Another medication with the same name was added. Make sure you understand how and when to take each. Your next dose is: Today, Tomorrow Other:  _________ Dose:  150 mg Take 150 mg by mouth every twelve (12) hours. Refills:  0  
     
   
   
   
  
 * dabigatran etexilate 150 mg capsule Commonly known as:  PRADAXA What changed: You were already taking a medication with the same name, and this prescription was added. Make sure you understand how and when to take each. Your next dose is: Today, Tomorrow Other:  _________ Dose:  150 mg Take 1 Cap by mouth every twelve (12) hours. Indications: PREVENT THROMBOEMBOLISM IN CHRONIC ATRIAL FIBRILLATION, PREVENTION OF DEEP VEIN THROMBOSIS RECURRENCE Quantity:  60 Cap Refills:  1  
     
   
   
   
  
 * Notice:   This list has 2 medication(s) that are the same as other medications prescribed for you. Read the directions carefully, and ask your doctor or other care provider to review them with you. CONTINUE these medications which have NOT CHANGED Dose & Instructions Dispensing Information Comments Morning Noon Evening Bedtime  
 albuterol 90 mcg/actuation inhaler Commonly known as:  PROVENTIL HFA, VENTOLIN HFA, PROAIR HFA Your next dose is: Today, Tomorrow Other:  _________ Dose:  2 Puff Take 2 Puffs by inhalation every four (4) hours as needed for Wheezing or Shortness of Breath (Cough). Quantity:  1 Inhaler Refills:  0 DUONEB 2.5 mg-0.5 mg/3 ml Nebu Generic drug:  albuterol-ipratropium Your next dose is: Today, Tomorrow Other:  _________ Dose:  3 mL  
3 mL by Nebulization route every six (6) hours as needed. Refills:  0  
     
   
   
   
  
 inhalational spacing device Your next dose is: Today, Tomorrow Other:  _________ ALWAYS USE WITH INHALER Quantity:  1 Device Refills:  0  
     
   
   
   
  
 omeprazole 20 mg capsule Commonly known as:  PRILOSEC Your next dose is: Today, Tomorrow Other:  _________ Dose:  1 Cap Take 1 Cap by mouth two (2) times a day. 2/1/17, QTY#60, 30 Days. 2/1/17 Dr. Jessi Caicedo Refills:  0 SPIRIVA WITH HANDIHALER 18 mcg inhalation capsule Generic drug:  tiotropium Your next dose is: Today, Tomorrow Other:  _________ Dose:  1 Cap Take 1 Cap by inhalation daily. 2/1/17 Dr. Jessi Caicedo Refills:  0 STOOL SOFTENER 100 mg capsule Generic drug:  docusate sodium Your next dose is: Today, Tomorrow Other:  _________ Dose:  1 Cap Take 1 Cap by mouth daily. 2/1/17, QTY#100 Caps, 30 Days. Dr. Jessi Caicedo Refills:  0  
     
   
   
   
  
 SYMBICORT 160-4.5 mcg/actuation HFA inhaler Generic drug:  budesonide-formoterol Your next dose is: Today, Tomorrow Other:  _________ Dose:  2 Puff Take 2 Puffs by inhalation two (2) times a day. Refills:  0 STOP taking these medications buPROPion  mg SR tablet Commonly known as:  WELLBUTRIN SR  
Replaced by:  buPROPion  mg XL tablet  
   
  
 hydrOXYzine pamoate 25 mg capsule Commonly known as:  VISTARIL  
   
  
 levoFLOXacin 750 mg tablet Commonly known as:  LEVAQUIN  
   
  
 predniSONE 20 mg tablet Commonly known as:  Charo Beam Where to Get Your Medications Information on where to get these meds will be given to you by the nurse or doctor. ! Ask your nurse or doctor about these medications ARIPiprazole 5 mg tablet buPROPion  mg XL tablet  
 dabigatran etexilate 150 mg capsule  
 sertraline 50 mg tablet Discharge Instructions BEHAVIORAL HEALTH NURSING DISCHARGE NOTE The following personal items collected during your admission are returned to you:  
Dental Appliance: Dental Appliances: None Vision: Visual Aid: None Hearing Aid:   
Jewelry: Jewelry: None Clothing: Clothing: [employer], [employer], [employer], Jacket/Coat, Pants, [place], [place], [employer], Sweater, Undergarments Other Valuables: Other Valuables: Cell Phone, Cigarettes Valuables sent to safe: Personal Items Sent to Safe:  (1 wallet containing 95dollars and 1 bank card) PATIENT INSTRUCTIONS: 
 
 
Regular diet. The discharge information has been reviewed with the patient. The patient verbalized understanding. Valderm Activation Thank you for requesting access to Valderm. Please follow the instructions below to securely access and download your online medical record. Valderm allows you to send messages to your doctor, view your test results, renew your prescriptions, schedule appointments, and more. How Do I Sign Up? 1. In your internet browser, go to [email or web address] 
2. Click on the First Time User? Click Here link in the Sign In box. You will be redirect to the New Member Sign Up page. 3. Enter your Motista Access Code exactly as it appears below. You will not need to use this code after youve completed the sign-up process. If you do not sign up before the expiration date, you must request a new code. Motista Access Code: [email or web address] Expires: 2017 12:12 PM (This is the date your Motista access code will ) 4. Enter the last four digits of your Social Security Number (xxxx) and Date of Birth (mm/dd/yyyy) as indicated and click Submit. You will be taken to the next sign-up page. 5. Create a Motista ID. This will be your Motista login ID and cannot be changed, so think of one that is secure and easy to remember. 6. Create a Motista password. You can change your password at any time. 7. Enter your Password Reset Question and Answer. This can be used at a later time if you forget your password. 8. Enter your e-mail address. You will receive e-mail notification when new information is available in [address] 9. Click Sign Up. You can now view and download portions of your medical record. 10. Click the Download Summary menu link to download a portable copy of your medical information. Additional Information If you have questions, please visit the Frequently Asked Questions section of the Motista website at [email or web address] Remember, Motista is NOT to be used for urgent needs. For medical emergencies, dial 911. Patient armband removed and shredded Discharge Orders None Introducing hospitals & [facility] SERVICES! Alvarado Terry introduces Motista patient portal. Now you can access parts of your medical record, email your doctor's office, and request medication refills online. 1. In your internet browser, go to [email or web address]"hart 2. Click on the First Time User? Click Here link in the Sign In box. You will see the New Member Sign Up page. 3. Enter your DoughMain Access Code exactly as it appears below. You will not need to use this code after youve completed the sign-up process. If you do not sign up before the expiration date, you must request a new code. · DoughMain Access Code: [email or web address] Expires: 2/21/2017 12:12 PM 
 
4. Enter the last four digits of your Social Security Number (xxxx) and Date of Birth (mm/dd/yyyy) as indicated and click Submit. You will be taken to the next sign-up page. 5. Create a DoughMain ID. This will be your DoughMain login ID and cannot be changed, so think of one that is secure and easy to remember. 6. Create a DoughMain password. You can change your password at any time. 7. Enter your Password Reset Question and Answer. This can be used at a later time if you forget your password. 8. Enter your e-mail address. You will receive e-mail notification when new information is available in [address] 9. Click Sign Up. You can now view and download portions of your medical record. 10. Click the Download Summary menu link to download a portable copy of your medical information. If you have questions, please visit the Frequently Asked Questions section of the DoughMain website. Remember, DoughMain is NOT to be used for urgent needs. For medical emergencies, dial 911. Now available from your iPhone and Android! General Information Please provide this summary of care documentation to your next provider. Patient Signature:  ____________________________________________________________ Date:  ____________________________________________________________  
  
Kareem Sport Provider Signature:  ____________________________________________________________ Date:  ____________________________________________________________

## 2017-02-10 NOTE — IP AVS SNAPSHOT
Summary of Care Report The Summary of Care report has been created to help improve care coordination. Users with access to [facility] or [employer] (Web-based application) may access additional patient information including the Discharge Summary. If you are not currently a [employer] user and need more information, please call the number listed below in the [place] [address] section and ask to be connected with Medical Records. Facility Information Name Address Phone [place] [id number] [facility] [address] [phone] Patient Information Patient Name Sex  Nat Meier ([id number]) Male 1950 Discharge Information Admitting Provider Service Area Unit Babs Correa MD / [address] [address] Adult [facility] / [phone] Discharge Provider Discharge Date/Time Discharge Disposition Destination (none) 2017 Morning (Pending) [place] (none) Patient Language Language ENGLISH [13] Problem List as of 2017  Date Reviewed: 2015 Codes Priority Class Noted - Resolved RESOLVED: Bronchitis ICD-10-CM: J40 ICD-9-CM: 482   Unknown - 2016 RESOLVED: Major depression, recurrent ([facility] ICD-10-CM: F33.9 ICD-9-CM: 296.30   2015 - 2016 RESOLVED: Cocaine abuse with cocaine-induced mood disorder ([facility] ICD-10-CM: F14.14 
ICD-9-CM: 292.84   2015 - 2016 Cocaine abuse (Chronic) ICD-10-CM: F14.10 ICD-9-CM: 305.60   2015 - Present Alcohol abuse (Chronic) ICD-10-CM: F10.10 ICD-9-CM: 305.00   2015 - Present Major depressive disorder (Chronic) ICD-10-CM: F32.9 ICD-9-CM: 296.20   2016 - Present Chronic obstructive pulmonary disease (HCC) (Chronic) ICD-10-CM: J44.9 ICD-9-CM: 840   2016 - Present Overview Signed 2/9/2017  6:17 PM by SHERRIE Wayne Overview:  
5/2013: FVC 40%, FEV1 38%,m ratio 74%, +BD response, +air trapping Prev required chronic prednisone 10mg daily Chronic obstructive pulmonary disease with acute exacerbation (HCC) ICD-10-CM: J44.1 ICD-9-CM: 491.21   11/22/2016 - Present Deep vein thrombosis (DVT) of lower extremity (HCC) (Chronic) ICD-10-CM: N67.623 ICD-9-CM: 453.40   11/22/2016 - Present Overview Signed 2/9/2017  6:17 PM by SHERRIE Wayne Overview: LLE 12/2014 LLE 7/2013 Obstructive sleep apnea syndrome (Chronic) ICD-10-CM: G02.90 
ICD-9-CM: 327.23   11/22/2016 - Present Acute respiratory failure ([facility] ICD-10-CM: J96.00 
ICD-9-CM: 518.81   4/24/2016 - Present Dyslipidemia ICD-10-CM: E78.5 ICD-9-CM: 272.4   1/6/2015 - Present Essential hypertension ICD-10-CM: I10 
ICD-9-CM: 401.9   2/9/2017 - Present Gastroesophageal reflux disease without esophagitis ICD-10-CM: K21.9 ICD-9-CM: 530.81   7/6/2016 - Present History of deep venous thrombosis ICD-10-CM: P03.837 ICD-9-CM: V12.51   7/6/2016 - Present Severe episode of recurrent major depressive disorder, without psychotic features ([facility].) ICD-10-CM: F33.2 ICD-9-CM: 296.33   2/14/2016 - Present Housing unsatisfactory ICD-10-CM: Z59.9 ICD-9-CM: V60.9   2/14/2016 - Present Suicidal ideation ICD-10-CM: R45.851 ICD-9-CM: V62.84   2/12/2016 - Present Vitamin D deficiency ICD-10-CM: E55.9 ICD-9-CM: 268.9   2/9/2017 - Present Renal insufficiency ICD-10-CM: N28.9 ICD-9-CM: 593.9   Unknown - Present Asthma ICD-10-CM: K79.372 ICD-9-CM: 493.90   Unknown - Present Chronic deep vein thrombosis (DVT) (HCC) ICD-10-CM: I82.509 ICD-9-CM: 453.50   Unknown - Present NEIL on CPAP ICD-10-CM: G47.33 
ICD-9-CM: 327.23   Unknown - Present Depression ICD-10-CM: F32.9 ICD-9-CM: 311   Unknown - Present Major depression ICD-10-CM: F32.9 ICD-9-CM: 296.20   Unknown - Present Hyperlipidemia ICD-10-CM: E78.5 ICD-9-CM: 272.4   Unknown - Present Hypertension ICD-10-CM: I10 
ICD-9-CM: 401.9   Unknown - Present Anxiety ICD-10-CM: F41.9 ICD-9-CM: 300.00   Unknown - Present GERD (gastroesophageal reflux disease) ICD-10-CM: K21.9 ICD-9-CM: 530.81   Unknown - Present Mild diastolic dysfunction BKP-31-QF: I51.9 ICD-9-CM: 429.9   Unknown - Present Major depressive disorder, recurrent episode, severe ([facility] ICD-10-CM: F33.2 ICD-9-CM: 296.33   2/10/2017 - Present You are allergic to the following Allergen Reactions Shellfish Derived Anaphylaxis Current Discharge Medication List  
  
START taking these medications Dose & Instructions Dispensing Information Comments buPROPion  mg XL tablet Commonly known as:  WELLBUTRIN XL  
Replaces:  buPROPion  mg SR tablet Dose:  300 mg Take 1 Tab by mouth every morning for 30 days. Indications: ANXIETY WITH DEPRESSION Quantity:  30 Tab Refills:  0  
   
 sertraline 50 mg tablet Commonly known as:  ZOLOFT Dose:  50 mg Take 1 Tab by mouth daily for 30 days. Indications: ANXIETY WITH DEPRESSION Quantity:  30 Tab Refills:  1 CONTINUE these medications which have CHANGED Dose & Instructions Dispensing Information Comments ARIPiprazole 5 mg tablet Commonly known as:  ABILIFY What changed:   
- medication strength 
- how much to take 
- additional instructions Dose:  5 mg Take 1 Tab by mouth daily. Indications: DEPRESSION TREATMENT ADJUNCT Quantity:  30 Tab Refills:  1  
   
 * PRADAXA 150 mg capsule Generic drug:  dabigatran etexilate What changed:  Another medication with the same name was added. Make sure you understand how and when to take each. Dose:  150 mg Take 150 mg by mouth every twelve (12) hours. Refills:  0  
   
 * dabigatran etexilate 150 mg capsule Commonly known as:  PRADAXA What changed: You were already taking a medication with the same name, and this prescription was added. Make sure you understand how and when to take each. Dose:  150 mg Take 1 Cap by mouth every twelve (12) hours. Indications: PREVENT THROMBOEMBOLISM IN CHRONIC ATRIAL FIBRILLATION, PREVENTION OF DEEP VEIN THROMBOSIS RECURRENCE Quantity:  60 Cap Refills:  1  
   
 * Notice: This list has 2 medication(s) that are the same as other medications prescribed for you. Read the directions carefully, and ask your doctor or other care provider to review them with you. CONTINUE these medications which have NOT CHANGED Dose & Instructions Dispensing Information Comments  
 albuterol 90 mcg/actuation inhaler Commonly known as:  PROVENTIL HFA, VENTOLIN HFA, PROAIR HFA Dose:  2 Puff Take 2 Puffs by inhalation every four (4) hours as needed for Wheezing or Shortness of Breath (Cough). Quantity:  1 Inhaler Refills:  0 DUONEB 2.5 mg-0.5 mg/3 ml Nebu Generic drug:  albuterol-ipratropium Dose:  3 mL  
3 mL by Nebulization route every six (6) hours as needed. Refills:  0  
   
 inhalational spacing device ALWAYS USE WITH INHALER Quantity:  1 Device Refills:  0  
   
 omeprazole 20 mg capsule Commonly known as:  PRILOSEC Dose:  1 Cap Take 1 Cap by mouth two (2) times a day. 2/1/17, QTY#60, 30 Days. 2/1/17 Dr. Estela Isidro Refills:  0 SPIRIVA WITH HANDIHALER 18 mcg inhalation capsule Generic drug:  tiotropium Dose:  1 Cap Take 1 Cap by inhalation daily. 2/1/17 Dr. Palmer Bolds Refills:  0 STOOL SOFTENER 100 mg capsule Generic drug:  docusate sodium Dose:  1 Cap Take 1 Cap by mouth daily. 2/1/17, QTY#100 Caps, 30 Days. Dr. Palmer Bolds Refills:  0  
   
 SYMBICORT 160-4.5 mcg/actuation HFA inhaler Generic drug:  budesonide-formoterol Dose:  2 Puff Take 2 Puffs by inhalation two (2) times a day. Refills:  0 STOP taking these medications Comments buPROPion  mg SR tablet Commonly known as:  WELLBUTRIN SR  
Replaced by:  buPROPion  mg XL tablet  
   
   
 hydrOXYzine pamoate 25 mg capsule Commonly known as:  VISTARIL  
   
   
 levoFLOXacin 750 mg tablet Commonly known as:  LEVAQUIN  
   
   
 predniSONE 20 mg tablet Commonly known as:  Rohit Lyons Current Immunizations Name Date Influenza Vaccine 10/12/2016, 12/3/2014 Pneumococcal Polysaccharide (PPSV-23) 2013 Follow-up Information Follow up With Details Comments Contact Info [address] [place] [address] Residential Program  30 day Intensive Inpatient Chemical Dependency Program. [phone] Transportation: [facility], [address]: 17 @ 2pm 
Arrive: 17 @ 1:15pm  in [place] Confirmation [id number]. Discharge Instructions BEHAVIORAL HEALTH NURSING DISCHARGE NOTE The following personal items collected during your admission are returned to you:  
Dental Appliance: Dental Appliances: None Vision: Visual Aid: None Hearing Aid:   
Jewelry: Jewelry: None Clothing: Clothin [address], [employer], [facility], Jacket/Coat, Pants, [place], [place], [employer], Sweater, Undergarments Other Valuables: Other Valuables: Cell Phone, Cigarettes Valuables sent to safe: Personal Items Sent to Safe:  (1 wallet containing 95dollars and 1 bank card) PATIENT INSTRUCTIONS: 
 
 
Regular diet. The discharge information has been reviewed with the patient. The patient verbalized understanding. [email or web address] Activation Thank you for requesting access to [email or web address]. Please follow the instructions below to securely access and download your online medical record. [email or web address] allows you to send messages to your doctor, view your test results, renew your prescriptions, schedule appointments, and more. How Do I Sign Up? 1. In your internet browser, go to [email or web address] 
 2. Click on the First Time User? Click Here link in the Sign In box. You will be redirect to the New Member Sign Up page. 3. Enter your PurePhoto Access Code exactly as it appears below. You will not need to use this code after youve completed the sign-up process. If you do not sign up before the expiration date, you must request a new code. PurePhoto Access Code: [email or web address] Expires: 2017 12:12 PM (This is the date your PurePhoto access code will ) 4. Enter the last four digits of your Social Security Number (xxxx) and Date of Birth (mm/dd/yyyy) as indicated and click Submit. You will be taken to the next sign-up page. 5. Create a [employer] ID. This will be your PurePhoto login ID and cannot be changed, so think of one that is secure and easy to remember. 6. Create a PurePhoto password. You can change your password at any time. 7. Enter your Password Reset Question and Answer. This can be used at a later time if you forget your password. 8. Enter your e-mail address. You will receive e-mail notification when new information is available in [address] 9. Click Sign Up. You can now view and download portions of your medical record. 10. Click the Download Summary menu link to download a portable copy of your medical information. Additional Information If you have questions, please visit the Frequently Asked Questions section of the PurePhoto website at [email or web address] Remember, PurePhoto is NOT to be used for urgent needs. For medical emergencies, dial 911. Patient armband removed and shredded Chart Review Routing History Recipient Method Report Sent By Nidia Boykin MD  
 In [address]ed [address] [[id number]] 2015  7:37 AM 2015

## 2017-02-10 NOTE — ED NOTES
Side rails up x 2: YES  Bed in low position and wheels locked: YES  Call bell within reach: NO. Sitter at bedside  Comfort addressed: YES   Toileting needs addressed: YES  Plan of care reviewed/updated with patient and or family members: YES  IV site assessed: N/A  Pain assessed and addressed: YES, 0

## 2017-02-10 NOTE — ED NOTES
Assume care of patient, patient resting at this time,.   Purposeful rounding completed:    Side rails up x 2:  YES  Bed in low position and wheels locked: YES  Call bell within reach: NO. Sitter at bedside  Comfort addressed: YES    Toileting needs addressed: YES  Plan of care reviewed/updated with patient and or family members: YES  IV site assessed: N/A  Pain assessed and addressed: YES, 0

## 2017-02-10 NOTE — ED NOTES
TRANSFER - OUT REPORT:    Verbal report given to Estephania Martinez RN(name) on Sed[facility] Founds  [facility]       Report consisted of patients Situation, Background, Assessment and   Recommendations(SBAR). Information from the following report(s) SBAR and Intake/Output was reviewed with the receiving nurse. Lines:       Opportunity for questions and clarification was provided.       Patient transported with: [facility]

## 2017-02-10 NOTE — DISCHARGE INSTRUCTIONS
[employer] Activation    Thank you for requesting access to [employer]. Please follow the instructions below to securely access and download your online medical record. [employer] allows you to send messages to your doctor, view your test results, renew your prescriptions, schedule appointments, and more. How Do I Sign Up? 1. In your internet browser, go to [email or web address]  2. Click on the First Time User? Click Here link in the Sign In box. You will be redirect to the New Member Sign Up page. 3. Enter your [employer] Access Code exactly as it appears below. You will not need to use this code after youve completed the sign-up process. If you do not sign up before the expiration date, you must request a new code. [employer] Access Code: [email or web address]  Expires: 2017 12:12 PM (This is the date your [employer] access code will )    4. Enter the last four digits of your Social Security Number (xxxx) and Date of Birth (mm/dd/yyyy) as indicated and click Submit. You will be taken to the next sign-up page. 5. Create a [employer] ID. This will be your [employer] login ID and cannot be changed, so think of one that is secure and easy to remember. 6. Create a [employer] password. You can change your password at any time. 7. Enter your Password Reset Question and Answer. This can be used at a later time if you forget your password. 8. Enter your e-mail address. You will receive e-mail notification when new information is available in [address]. 9. Click Sign Up. You can now view and download portions of your medical record. 10. Click the Download Summary menu link to download a portable copy of your medical information. Additional Information    If you have questions, please visit the Frequently Asked Questions section of the [employer] website at [email or web address] Remember, [employer] is NOT to be used for urgent needs. For medical emergencies, dial 911.

## 2017-02-10 NOTE — IP AVS SNAPSHOT
Current Discharge Medication List  
  
Take these medications at their scheduled times Dose & Instructions Dispensing Information Comments Morning Noon Evening Bedtime ARIPiprazole 5 mg tablet Commonly known as:  ABILIFY Your next dose is: Today, Tomorrow Other:  ____________ Dose:  5 mg Take 1 Tab by mouth daily. Indications: DEPRESSION TREATMENT ADJUNCT Quantity:  30 Tab Refills:  1  
     
   
   
   
  
 buPROPion  mg XL tablet Commonly known as:  Johnice Matamoros Your next dose is: Today, Tomorrow Other:  ____________ Dose:  300 mg Take 1 Tab by mouth every morning for 30 days. Indications: ANXIETY WITH DEPRESSION Quantity:  30 Tab Refills:  0  
     
   
   
   
  
 omeprazole 20 mg capsule Commonly known as:  PRILOSEC Your next dose is: Today, Tomorrow Other:  ____________ Dose:  1 Cap Take 1 Cap by mouth two (2) times a day. 2/1/17, QTY#60, 30 Days. 2/1/17 Dr. Celia Parsons Refills:  0  
     
   
   
   
  
 * PRADAXA 150 mg capsule Generic drug:  dabigatran etexilate Your next dose is: Today, Tomorrow Other:  ____________ Dose:  150 mg Take 150 mg by mouth every twelve (12) hours. Refills:  0  
     
   
   
   
  
 * dabigatran etexilate 150 mg capsule Commonly known as:  PRADAXA Your next dose is: Today, Tomorrow Other:  ____________ Dose:  150 mg Take 1 Cap by mouth every twelve (12) hours. Indications: PREVENT THROMBOEMBOLISM IN CHRONIC ATRIAL FIBRILLATION, PREVENTION OF DEEP VEIN THROMBOSIS RECURRENCE Quantity:  60 Cap Refills:  1  
     
   
   
   
  
 sertraline 50 mg tablet Commonly known as:  ZOLOFT Your next dose is: Today, Tomorrow Other:  ____________ Dose:  50 mg Take 1 Tab by mouth daily for 30 days. Indications: ANXIETY WITH DEPRESSION Quantity:  30 Tab Refills:  1 SPIRIVA WITH HANDIHALER 18 mcg inhalation capsule Generic drug:  tiotropium Your next dose is: Today, Tomorrow Other:  ____________ Dose:  1 Cap Take 1 Cap by inhalation daily. 2/1/17 Dr. Susy Heath Refills:  0 STOOL SOFTENER 100 mg capsule Generic drug:  docusate sodium Your next dose is: Today, Tomorrow Other:  ____________ Dose:  1 Cap Take 1 Cap by mouth daily. 2/1/17, QTY#100 Caps, 30 Days. Dr. Susy Heath Refills:  0  
     
   
   
   
  
 SYMBICORT 160-4.5 mcg/actuation HFA inhaler Generic drug:  budesonide-formoterol Your next dose is: Today, Tomorrow Other:  ____________ Dose:  2 Puff Take 2 Puffs by inhalation two (2) times a day. Refills:  0  
     
   
   
   
  
 * Notice: This list has 2 medication(s) that are the same as other medications prescribed for you. Read the directions carefully, and ask your doctor or other care provider to review them with you. Take these medications as needed Dose & Instructions Dispensing Information Comments Morning Noon Evening Bedtime  
 albuterol 90 mcg/actuation inhaler Commonly known as:  PROVENTIL HFA, VENTOLIN HFA, PROAIR HFA Your next dose is: Today, Tomorrow Other:  ____________ Dose:  2 Puff Take 2 Puffs by inhalation every four (4) hours as needed for Wheezing or Shortness of Breath (Cough). Quantity:  1 Inhaler Refills:  0 DUONEB 2.5 mg-0.5 mg/3 ml Nebu Generic drug:  albuterol-ipratropium Your next dose is: Today, Tomorrow Other:  ____________ Dose:  3 mL  
3 mL by Nebulization route every six (6) hours as needed. Refills:  0 Take these medications as directed Dose & Instructions Dispensing Information Comments Morning Noon Evening Bedtime  
 inhalational spacing device Your next dose is: Today, Tomorrow Other:  ____________ ALWAYS USE WITH INHALER Quantity:  1 Device Refills:  0 Where to Get Your Medications Information about where to get these medications is not yet available ! Ask your nurse or doctor about these medications ARIPiprazole 5 mg tablet buPROPion  mg XL tablet  
 dabigatran etexilate 150 mg capsule  
 sertraline 50 mg tablet

## 2017-02-11 PROCEDURE — 74011000250 HC RX REV CODE- 250: Performed by: PSYCHIATRY & NEUROLOGY

## 2017-02-11 PROCEDURE — 74011250637 HC RX REV CODE- 250/637: Performed by: PSYCHIATRY & NEUROLOGY

## 2017-02-11 PROCEDURE — 65220000003 HC RM SEMIPRIVATE PSYCH

## 2017-02-11 PROCEDURE — 74011636637 HC RX REV CODE- 636/637: Performed by: PSYCHIATRY & NEUROLOGY

## 2017-02-11 RX ORDER — BUPROPION HYDROCHLORIDE 300 MG/1
300 TABLET ORAL
Status: DISCONTINUED | OUTPATIENT
Start: 2017-02-11 | End: 2017-02-21 | Stop reason: HOSPADM

## 2017-02-11 RX ORDER — ACETAMINOPHEN 325 MG/1
650 TABLET ORAL
Status: DISCONTINUED | OUTPATIENT
Start: 2017-02-11 | End: 2017-02-21 | Stop reason: HOSPADM

## 2017-02-11 RX ORDER — DABIGATRAN ETEXILATE 150 MG/1
150 CAPSULE ORAL EVERY 12 HOURS
Status: DISCONTINUED | OUTPATIENT
Start: 2017-02-11 | End: 2017-02-21 | Stop reason: HOSPADM

## 2017-02-11 RX ORDER — SERTRALINE HYDROCHLORIDE 50 MG/1
50 TABLET, FILM COATED ORAL DAILY
Status: DISCONTINUED | OUTPATIENT
Start: 2017-02-11 | End: 2017-02-21 | Stop reason: HOSPADM

## 2017-02-11 RX ADMIN — LEVOFLOXACIN 750 MG: 750 TABLET, FILM COATED ORAL at 06:41

## 2017-02-11 RX ADMIN — DABIGATRAN ETEXILATE MESYLATE 150 MG: 150 CAPSULE ORAL at 21:13

## 2017-02-11 RX ADMIN — PANTOPRAZOLE SODIUM 40 MG: 40 TABLET, DELAYED RELEASE ORAL at 16:37

## 2017-02-11 RX ADMIN — DOCUSATE SODIUM 100 MG: 100 CAPSULE, LIQUID FILLED ORAL at 08:57

## 2017-02-11 RX ADMIN — TRAZODONE HYDROCHLORIDE 50 MG: 50 TABLET ORAL at 20:37

## 2017-02-11 RX ADMIN — PREDNISONE 60 MG: 20 TABLET ORAL at 08:57

## 2017-02-11 RX ADMIN — PREDNISONE 60 MG: 20 TABLET ORAL at 12:07

## 2017-02-11 RX ADMIN — ARIPIPRAZOLE 10 MG: 10 TABLET ORAL at 08:57

## 2017-02-11 RX ADMIN — PREDNISONE 60 MG: 20 TABLET ORAL at 16:37

## 2017-02-11 RX ADMIN — IPRATROPIUM BROMIDE AND ALBUTEROL SULFATE 3 ML: .5; 3 SOLUTION RESPIRATORY (INHALATION) at 09:53

## 2017-02-11 RX ADMIN — IPRATROPIUM BROMIDE AND ALBUTEROL SULFATE 3 ML: .5; 3 SOLUTION RESPIRATORY (INHALATION) at 18:48

## 2017-02-11 RX ADMIN — TIOTROPIUM BROMIDE 18 MCG: 18 CAPSULE ORAL; RESPIRATORY (INHALATION) at 09:02

## 2017-02-11 RX ADMIN — PANTOPRAZOLE SODIUM 40 MG: 40 TABLET, DELAYED RELEASE ORAL at 06:41

## 2017-02-11 NOTE — BH NOTES
GROUP THERAPY PROGRESS NOTE    Shantelle Caldwell is participating in Coping Skills Group. Group time: 45 minutes    Goal orientation: personal    Group therapy participation: active    Therapeutic interventions reviewed and discussed:   How To Love Yourself in 12 Easy Ways    Impression of participation:   Patient voluntarily read part of the handout and shared personal thoughts and feelings during the group.

## 2017-02-11 NOTE — BH NOTES
GROUP THERAPY PROGRESS NOTE    Cameron Cooley is participating in [place]. Group time: 15 minutes    Goal orientation: community    Group therapy participation: active    Patient was in the day area, however, he was unable to sit still very long. He remained pacing between the day area and his room mostly during group.

## 2017-02-11 NOTE — PROGRESS NOTES
Problem: Falls - Risk of  Goal: *Absence of falls  Outcome: Progressing Towards Goal  No falls     Problem: Suicide/Homicide (Adult/Pediatric)  Goal: *STG: Remains safe in hospital  Pt will make no attempts to harm self or others during hospitalization   Outcome: Progressing Towards Goal  No attempts to harm self or others   Goal: *STG: Seeks staff when feelings of self harm or harm towards others arise  Pt will verbally contract with staff each shift during hospitalization   Outcome: Progressing Towards Goal  Verbally contracts for safety   Goal: *STG/LTG: Complies with medication therapy  Pt will comply with all daily prescribed medications during hospitalization  Outcome: Progressing Towards Goal  Compliant   Goal: *STG/LTG: No longer expresses self destructive or suicidal/homicidal thoughts  Pt will deny any thoughts of harming self or others prior to discharge   Outcome: Progressing Towards Goal  Denies     Comments:   Pt is pleasant on approach. He is compliant with all medications. Pt denies any thoughts of harming self or others and verbally contracts for safety. Pt states he is interested in long term rehab for addiction. He was advised to speak with MD and social work about rehab.

## 2017-02-11 NOTE — BH NOTES
GROUP THERAPY PROGRESS NOTE    Leah Brown is not participating in Goals Group.      Group time: 30 minutes    Group therapy participation: encouraged to participate but did not

## 2017-02-11 NOTE — BH NOTES
Patient arrived on unit escorted by security with calm and cooperative behaviors. Patient reported being admitted for feeling suicidal and relapsing related to increased stressors. Patient denies current suicidal thoughts and has contracted for safety. Patient denies auditory/visual hallucinations currently however states he does at times have auditory hallucinations. Patient reports that he has had suicidal thoughts previously however denies previous attempt. Patient has long history of substance abuse stating he began using crack at age 39 however denies current use of ETOH, Tobacco products or additional substances. Patient was previously admitted to [address] [place] [address] in 2016 and was discharged out to substance abuse and recovery treatment where he stated he was successful until relapsing 1 week ago when he relapsed and began using crack cocaine. Patients medical history includes Emphysema, COPD, Asthma and a history of bronchitis. Patient received Albuterol treatment and scheduled medication this evening.

## 2017-02-11 NOTE — BH NOTES
Amrita Michelle is participating in  [facility]. Group time: 30 minutes    Personal goal for participation: Change is self Awareness    Goal orientation: social    Group therapy participation: active    Therapeutic interventions reviewed and discussed: \"To solve any problem, You must Change. \"    Impression of participation: What specifically do I want to change about me?   I want to stop using

## 2017-02-11 NOTE — BH NOTES
Pt has been pleasant and cooperative. Pt interacted well with staff and peers and affect is bright. Pt participated in early group and ate dinner. Pt has denies active thoughts of self-harm and has appeared steady on his feet when walking to and from room.

## 2017-02-11 NOTE — H&P
History and Physical        Patient: Marco Bhagat               Sex: male          DOA: 2/10/2017         YOB: 1950      Age:  77 y.o.        LOS:  LOS: 1 day        HPI:     Marco Bhagat is a 77 y.o. male who was admitted experiencing depression,suicidal ideation and cocaine addiction. Active Problems:    Major depressive disorder, recurrent episode, severe ([facility] [address] (2/10/2017)        Past Medical History   Diagnosis Date    Alcohol abuse     Anxiety     Asthma     Bronchitis     Chronic deep vein thrombosis (DVT) ([facility] [address] 07/2013 07/2013, 12/2014 ([facility])    Cocaine abuse     COPD     Depression     GERD (gastroesophageal reflux disease)     Hyperlipidemia     Hypertension     Major depression     Mild diastolic dysfunction     NEIL on CPAP     Renal insufficiency     Suicidal ideation     Vitamin D deficiency        No past surgical history on file. No family history on file. Social History     Social History    Marital status:      Spouse name: N/A    Number of children: 2    Years of education: GED     Social History Main Topics    Smoking status: Former Smoker    Smokeless tobacco: Not on file      Comment: States that he quit 12 years ago.  Alcohol use Yes    Drug use: Yes     Special: Cocaine    Sexual activity: Not on file     Other Topics Concern    Not on file     Social History Narrative   Patient states he is homeless. Reports appetite and sleep have been poor. He receives disability. Prior to Admission medications    Medication Sig Start Date End Date Taking? Authorizing Provider   albuterol (PROVENTIL HFA, VENTOLIN HFA, PROAIR HFA) 90 mcg/actuation inhaler Take 2 Puffs by inhalation every four (4) hours as needed for Wheezing or Shortness of Breath (Cough). 2/10/17  Yes SHERRIE Gil Si   predniSONE (DELTASONE) 20 mg tablet Begin taking tomorrow Saturday, 2/11/17 as you received your initial dose while in the ER.      3 tabs po every day x 2 days, then 2 tabs po every day x 3 days, then 1 tab po every day x 3 days. 2/11/17   SHERRIE Coery   levoFLOXacin (LEVAQUIN) 750 mg tablet Take 1 Tab by mouth daily for 4 days. Begin taking tomorrow Saturday, 2/11/17 as you received your initial dose while in the ER. 2/11/17 2/15/17  SHERRIE Corey   inhalational spacing device ALWAYS USE WITH INHALER 2/10/17   SHERRIE Corey   levoFLOXacin (LEVAQUIN) 750 mg tablet Take 1 Tab by mouth daily. 2/10/17   Kirstie Garcia NP   SPIRIVA WITH HANDIHALER 18 mcg inhalation capsule Take 1 Cap by inhalation daily. 2/1/17 Dr. Marco Nagel 2/1/17   Keisha Obrien MD   omeprazole (PRILOSEC) 20 mg capsule Take 1 Cap by mouth two (2) times a day. 2/1/17, QTY#60, 30 Days. 2/1/17 Dr. Marco Nagel 2/1/17   Keisha Obrien MD   STOOL SOFTENER 100 mg capsule Take 1 Cap by mouth daily. 2/1/17, QTY#100 Caps, 30 Days. Dr. Marco Nagel 2/1/17   Keisha Obrien MD   hydrOXYzine pamoate (VISTARIL) 25 mg capsule Take 1 Cap by mouth two (2) times a day. 2/1/17, QTY#60 Caps, 30 Days. Dr. Marco Nagel 2/1/17   Keisha Obrien MD   buPROPion SR [facility] SR) 150 mg SR tablet Take 1 Tab by mouth two (2) times a day. 11/2/16, QTY#60 Tabs, 30 Days. Dr. Marco Nagel 11/2/16   Keisha Obrien MD   ABILIFY 20 mg tablet Take 0.5 Tabs by mouth daily. 2/1/17, QTY#15 Tabs, 30 Days. Dr. Marco Nagel 2/1/17   Keisha Obrien MD   budesonide-formoterol (SYMBICORT) 160-4.5 mcg/actuation HFA inhaler Take 2 Puffs by inhalation two (2) times a day. Historical Provider   dabigatran etexilate (PRADAXA) 150 mg capsule Take 150 mg by mouth every twelve (12) hours. Historical Provider   albuterol-ipratropium (DUONEB) 2.5 mg-0.5 mg/3 ml nebu 3 mL by Nebulization route every six (6) hours as needed. Historical Provider       Allergies   Allergen Reactions    Shellfish Derived Anaphylaxis       Review of Systems  A comprehensive review of systems was negative.       Physical Exam:      Visit Vitals    BP (P) 121/82 (BP 1 Location: Right arm)    Pulse (P) 96    Temp (P) 97 °F (36.1 °C)    Resp (P) 18       Physical Exam:    General:  Alert, cooperative, well developed, well nourished AA male, no distress, appears stated age. Eyes:  Conjunctivae/corneas clear. PERRL, EOMs intact. Fundi benign   Ears:  Normal TMs and external ear canals both ears. Nose: Nares normal. Septum midline. Mucosa normal. No drainage or sinus tenderness. Mouth/Throat: Lips, mucosa, and tongue normal. Teeth and gums normal.   Neck: Supple, symmetrical, trachea midline, no adenopathy, thyroid: no enlargement/tenderness/nodules, no carotid bruit and no JVD. Back:   Symmetric, no curvature. ROM normal. No CVA tenderness. Lungs:   Clear to auscultation bilaterally. Heart:  Regular rate and rhythm, S1, S2 normal, no murmur, click, rub or gallop. Abdomen:   Soft, non-tender. Bowel sounds normal. No masses,  No organomegaly. Extremities: Extremities normal, atraumatic, no cyanosis or edema. Pulses: 2+ and symmetric all extremities. Skin: Skin color, texture, turgor normal. No rashes or lesions   Lymph nodes: Cervical, supraclavicular, and axillary nodes normal.   Neurologic: CNII-XII intact. Normal strength, sensation and reflexes throughout.            Assessment/Plan     Depression   Suicidal ideation  Cocaine dependence  Labs reviewed (+Cocaine)  Continue treatment per physician's orders

## 2017-02-12 LAB
BACTERIA SPEC CULT: ABNORMAL
SERVICE CMNT-IMP: ABNORMAL

## 2017-02-12 PROCEDURE — 65220000003 HC RM SEMIPRIVATE PSYCH

## 2017-02-12 PROCEDURE — 74011250637 HC RX REV CODE- 250/637: Performed by: PSYCHIATRY & NEUROLOGY

## 2017-02-12 PROCEDURE — 74011000250 HC RX REV CODE- 250: Performed by: PSYCHIATRY & NEUROLOGY

## 2017-02-12 PROCEDURE — 74011636637 HC RX REV CODE- 636/637: Performed by: PSYCHIATRY & NEUROLOGY

## 2017-02-12 RX ORDER — PANTOPRAZOLE SODIUM 40 MG/1
40 TABLET, DELAYED RELEASE ORAL
Status: CANCELLED | OUTPATIENT
Start: 2017-02-13

## 2017-02-12 RX ADMIN — DOCUSATE SODIUM 100 MG: 100 CAPSULE, LIQUID FILLED ORAL at 08:28

## 2017-02-12 RX ADMIN — PREDNISONE 40 MG: 20 TABLET ORAL at 17:03

## 2017-02-12 RX ADMIN — IPRATROPIUM BROMIDE AND ALBUTEROL SULFATE 3 ML: .5; 3 SOLUTION RESPIRATORY (INHALATION) at 19:25

## 2017-02-12 RX ADMIN — PREDNISONE 40 MG: 20 TABLET ORAL at 11:57

## 2017-02-12 RX ADMIN — PREDNISONE 60 MG: 20 TABLET ORAL at 09:24

## 2017-02-12 RX ADMIN — IPRATROPIUM BROMIDE AND ALBUTEROL SULFATE 3 ML: .5; 3 SOLUTION RESPIRATORY (INHALATION) at 08:33

## 2017-02-12 RX ADMIN — LEVOFLOXACIN 750 MG: 750 TABLET, FILM COATED ORAL at 06:45

## 2017-02-12 RX ADMIN — DABIGATRAN ETEXILATE MESYLATE 150 MG: 150 CAPSULE ORAL at 08:28

## 2017-02-12 RX ADMIN — TRAZODONE HYDROCHLORIDE 50 MG: 50 TABLET ORAL at 20:27

## 2017-02-12 RX ADMIN — PANTOPRAZOLE SODIUM 40 MG: 40 TABLET, DELAYED RELEASE ORAL at 16:26

## 2017-02-12 RX ADMIN — TIOTROPIUM BROMIDE 18 MCG: 18 CAPSULE ORAL; RESPIRATORY (INHALATION) at 08:28

## 2017-02-12 RX ADMIN — ARIPIPRAZOLE 10 MG: 10 TABLET ORAL at 08:28

## 2017-02-12 RX ADMIN — BENZOCAINE AND MENTHOL 1 LOZENGE: 15; 3.6 LOZENGE ORAL at 20:43

## 2017-02-12 RX ADMIN — DABIGATRAN ETEXILATE MESYLATE 150 MG: 150 CAPSULE ORAL at 20:26

## 2017-02-12 RX ADMIN — BUPROPION HYDROCHLORIDE 300 MG: 300 TABLET, FILM COATED, EXTENDED RELEASE ORAL at 07:52

## 2017-02-12 RX ADMIN — PANTOPRAZOLE SODIUM 40 MG: 40 TABLET, DELAYED RELEASE ORAL at 06:45

## 2017-02-12 NOTE — BH NOTES
GROUP THERAPY PROGRESS NOTE    Lobo Hartley is participating in Leisure-Creative Group. Group time: 1 hour    Goal orientation: relaxation      Group therapy participation: active      Therapeutic interventions reviewed and discussed: Patients were given Heart coloring sheets and a variety of craft supplies that could be used for decorating their heart pictures. They were encouraged to use the heart to make a reminder of [address] of participation:   Noreengonzaloashley Janeth really took time with his heart picture and seemed to really enjoy it. He stated how relaxing it was to work on the craft.

## 2017-02-12 NOTE — PROGRESS NOTES
Culture shows intermediate sensitivity to Levaquin, which was prescribed at discharge from behavioral health at [facility]. Susceptible to macrobid. Attempted to call patient, but no answer. Left VM message. Will also send letter.

## 2017-02-12 NOTE — BH NOTES
GROUP THERAPY PROGRESS NOTE    Rafa Pretty is participating in Substance abuse. Group time: 1 hour    Goal orientation: personal    Group therapy participation: active    Therapeutic interventions reviewed and discussed: The patients watched a video entitled \"Pleasure Unwoven\", an indepth look at the debate over whether or not addiction can really be considered a \"disease\". Impression of participation:   Patient watched attentively.

## 2017-02-12 NOTE — PROGRESS NOTES
Problem: Falls - Risk of  Goal: *Absence of falls  Outcome: Progressing Towards Goal  No falls     Problem: Suicide/Homicide (Adult/Pediatric)  Goal: *STG: Remains safe in hospital  Pt will make no attempts to harm self or others during hospitalization   Outcome: Progressing Towards Goal  No attempts to harm self or others   Goal: *STG: Seeks staff when feelings of self harm or harm towards others arise  Pt will verbally contract with staff each shift during hospitalization   Outcome: Progressing Towards Goal  Pt verbally contracts for safety   Goal: *STG/LTG: Complies with medication therapy  Pt will comply with all daily prescribed medications during hospitalization   Outcome: Progressing Towards Goal  Compliant   Goal: *STG/LTG: No longer expresses self destructive or suicidal/homicidal thoughts  Pt will deny any thoughts of harming self or others prior to discharge   Outcome: Progressing Towards Goal  Denies     Comments:   Pt is playing cards and social with peers. He denies any thoughts of harming self or others and verbally contracts for safety. Pt is compliant with medications. Pt has a wheezing cough at times and was given respiratory treatment this morning. He states he only smokes when doing drugs.

## 2017-02-12 NOTE — BH NOTES
GROUP THERAPY PROGRESS NOTE    Amrita Michelle is participating in [place]. Group time: 15 minutes      Goal orientation: community      Group therapy participation: active      Therapeutic interventions reviewed and discussed: Unit guidelines and the evening schedule was reviewed. Patients were given the opportunity to voice any concerns or complaints.

## 2017-02-12 NOTE — BH NOTES
GROUP THERAPY PROGRESS NOTE    Prakash Johnson is not participating in Recreational Therapy.      Group time: 50 minutes    Group therapy participation: encouraged to participate but did not

## 2017-02-12 NOTE — H&P
[facility]  ROUTINE H AND PS    Name:  Amol Evangelista  MR#:  [id number]  :  1950  Account #:  [de-identified]  Date of Adm:  02/10/2017      CHIEF COMPLAINT: \" same occasion\"    HISTORY OF PRESENT ILLNESS: The patient was a 77-year-old  male who stated that he was suffering from severe depression and  suicidal thoughts that were associated with cocaine use. The patient  stated that he has been feeling on a downhill spiral for several years. He stated this was associated with a relationship breakup about 1-1/2  years ago. The patient stated that after a period of sobriety he had  relapsed cocaine use about a week ago. The patient stated that over  this week he used $600 worth of cocaine. The patient complained that  he feels acutely depressed. He says his wife  in . He  complains that he is suicidal and always had thoughts. When asked  about a plan the patient states he wants to get so drunk or shot up  from illicit drugs that he won't feel himself die. The patient denies a  history of previous suicide attempts. When patient was asked about  psychotic symptoms the patient made vague statements about hearing  mumbling voices. When the patient was asked about anxiety he says  he worries about stressors including not being where he wants to be in  his 62s and being frustrated with his housing situation. He denied  generalized anxiety or pain. The patient requested that he be enrolled  in some type of long-term substance abuse treatment program. The  patient states that he was in a similar program last year and that it was  helpful. FAMILY PSYCHIATRIC HISTORY: None. PAST PSYCHIATRIC HISTORY: The patient stated he receives  mental health treatment at the [facility]. PAST MEDICAL HISTORY: COPD, bronchitis, asthma and  emphysema. MEDICATIONS: The patient feels that he is takin. Bupropion. 2. Unknown antidepressant.     ALLERGIES: NO KNOWN DRUG-RELATED ALLERGIES. SUBSTANCE ABUSE HISTORY: The patient states that in recent  history he has tried to get into the [facility] for substance  abuse treatment, but claimed that he had been turned down twice. He  stated that he was at a residential treatment about a year ago. The  patient states that he drank a fifth of liquor over the past few days,  although does not drink alcohol on a regular basis and denies  withdrawal symptoms. SOCIAL HISTORY: The patient complained he is moving from  rooming house to rooming house. The patient described himself as  single and being a . He makes $1800 a month from [employer] and [employer]; however, complained that he blows  his money on drugs. The patient states it is difficult for him to find a  apartment because he has a criminal record. He stated in 1997 he was  charged with unlawfully wounding someone. MENTAL STATUS EXAMINATION: The patient was depressed. Affect  congruent. Speech normal. Thought process logical. The patient  complained of suicidal thoughts, but denied homicidal thoughts. The  patient denied visual hallucinations. He complained of very loud  auditory hallucinations. Memory and cognition grossly intact. Insight  and judgment fair. ASSESSMENT: This is a 36-year-old male with acute depression  associated with cocaine use. DMS-V DIAGNOSES:  1. Major depressive disorder, recurrent, rule out psychotic features. 2. Substance-induced mood disorder. 3. Cocaine use disorder, severe. TREATMENT PLAN:  1. Start  Zoloft 50 mg for depression. 2. Start Wellbutrin  mg daily for depression. 3. Continue Abilify 10 mg daily for psychotic features and  augmentation. 4. Crisis intervention. 5. Milieu therapy. 6. Encouraged the patient to consult a  regarding  substance abuse treatment options. 7. Discharge planning. ESTIMATED LENGTH OF STAY: 4 to 5 days.         MD Levon Kemp / Jenifer  D:  02/11/2017 20:42  T:  02/12/2017   00:41  Job #:  [id number]

## 2017-02-13 PROCEDURE — 74011250637 HC RX REV CODE- 250/637: Performed by: PSYCHIATRY & NEUROLOGY

## 2017-02-13 PROCEDURE — 74011636637 HC RX REV CODE- 636/637: Performed by: PSYCHIATRY & NEUROLOGY

## 2017-02-13 PROCEDURE — 74011000250 HC RX REV CODE- 250: Performed by: PSYCHIATRY & NEUROLOGY

## 2017-02-13 PROCEDURE — 65220000003 HC RM SEMIPRIVATE PSYCH

## 2017-02-13 RX ADMIN — PREDNISONE 40 MG: 20 TABLET ORAL at 08:22

## 2017-02-13 RX ADMIN — BUPROPION HYDROCHLORIDE 300 MG: 300 TABLET, FILM COATED, EXTENDED RELEASE ORAL at 06:44

## 2017-02-13 RX ADMIN — TRAZODONE HYDROCHLORIDE 50 MG: 50 TABLET ORAL at 20:31

## 2017-02-13 RX ADMIN — TIOTROPIUM BROMIDE 18 MCG: 18 CAPSULE ORAL; RESPIRATORY (INHALATION) at 08:25

## 2017-02-13 RX ADMIN — SERTRALINE 50 MG: 50 TABLET, FILM COATED ORAL at 08:21

## 2017-02-13 RX ADMIN — PREDNISONE 40 MG: 20 TABLET ORAL at 16:22

## 2017-02-13 RX ADMIN — IPRATROPIUM BROMIDE AND ALBUTEROL SULFATE 3 ML: .5; 3 SOLUTION RESPIRATORY (INHALATION) at 08:21

## 2017-02-13 RX ADMIN — PREDNISONE 40 MG: 20 TABLET ORAL at 12:06

## 2017-02-13 RX ADMIN — DABIGATRAN ETEXILATE MESYLATE 150 MG: 150 CAPSULE ORAL at 08:22

## 2017-02-13 RX ADMIN — LEVOFLOXACIN 750 MG: 750 TABLET, FILM COATED ORAL at 06:44

## 2017-02-13 RX ADMIN — BENZOCAINE AND MENTHOL 1 LOZENGE: 15; 3.6 LOZENGE ORAL at 07:45

## 2017-02-13 RX ADMIN — BENZOCAINE AND MENTHOL 1 LOZENGE: 15; 3.6 LOZENGE ORAL at 16:23

## 2017-02-13 RX ADMIN — ARIPIPRAZOLE 10 MG: 10 TABLET ORAL at 08:22

## 2017-02-13 RX ADMIN — PANTOPRAZOLE SODIUM 40 MG: 40 TABLET, DELAYED RELEASE ORAL at 16:22

## 2017-02-13 RX ADMIN — PANTOPRAZOLE SODIUM 40 MG: 40 TABLET, DELAYED RELEASE ORAL at 06:44

## 2017-02-13 RX ADMIN — DABIGATRAN ETEXILATE MESYLATE 150 MG: 150 CAPSULE ORAL at 20:31

## 2017-02-13 RX ADMIN — DOCUSATE SODIUM 100 MG: 100 CAPSULE, LIQUID FILLED ORAL at 08:22

## 2017-02-13 NOTE — BH NOTES
Delphine Hall is  participating in [place]. Group time: 15 minutes    Personal goal for participation: Community announcement    Goal orientation: community    Group therapy participation: fully participated    Therapeutic interventions reviewed and discussed: Staff encouraged Pt. To report any maintenance/housekeeping or community concerns to staff so it can be addressed. Impression of participation: Pt. Did not have any maintenance/housekeeping or community concerns to report to staff .

## 2017-02-13 NOTE — BH NOTES
Psychiatry progress note    Chart reviewed, patient interviewed. Discussed intake evaluation and events leading up to hospitalized. Patient reports he is struggling with crack cocaine and alcohol addiction. Complained that addiction took the past 6 months of his life. Complained he fell in love with a younger woman and she filed false assault charges against him. Patient complained he is having some withdrawal symptoms - sweats. Complained of continued suicidal thoughts for the past 6 months. Says he is hearing muttering voices and humming. Patient stated his treatment goal is to build up hope. On exam, patient looks uncomfortable. +SI no HI +AH no VH. Insight and judgment fair. Meds are Zoloft 50 qDay, Wellbutrin  qDay and Abilify 10 qDay    Assessment - MDD recurrent with possible psychotic features, substance induced mood disorder and cocaine use disorder severe. Patient is suffering from substance related phenomenon and the consequences of substance use. Conducted brief supportive intervention through encouragement. Continue current treatment plan.

## 2017-02-13 NOTE — BH NOTES
GROUP THERAPY PROGRESS NOTE    Devika Bobby is not participating in Recreational Therapy. Group time: 45 minutes    Personal goal for participation: fresh air break    Goal orientation: passive    Group therapy participation: refuse    Therapeutic interventions reviewed and discussed: Staff encouraged Pt to participate in group. Impression of participation: Pt refuse chose to rest in bed despite staff encouragement.

## 2017-02-13 NOTE — BSMART NOTE
OCCUPATIONAL THERAPY PROGRESS NOTE  Group Time:  0552  Attendance: The patient attended full group. Participation:  The patient participated with moderate elaboration in the activity. Attention:  The patient was able to focus on the activity. Interaction:  The patient occasionally  interacts with others. Identified his relapse as related to someone he was seeing and going around drugs/alcohol again thinking he could handle it after his brief sobriety.

## 2017-02-13 NOTE — BSMART NOTE
SW Contact:  Pt encouraged to consider going to local [facility] or possible return to [facility].

## 2017-02-13 NOTE — BH NOTES
Patient has fully participated in all unit activities this shift. He was very interested in the video about the disease of addiction. In between groups he was social with peers playing cards and talking. No visitors this shift and no falls observed. Patient is pleasant and cooperative.
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
## 2017-02-13 NOTE — BH NOTES
GROUP THERAPY PROGRESS NOTE    Mirian Major is participating in Positive thoughts. Group time: 1 hour     Goal orientation: personal     Group therapy participation: active     Therapeutic interventions reviewed and discussed: Patients read and discussed Positive thought quotes.     Impression of participation:  Patient participated fully.

## 2017-02-13 NOTE — BH NOTES
Behavioral Health Progress Note      2017    Gisella Padma    Current Diagnosis:  Cocaine depression with severe use  F14.24     Report from the nursing staff changes in the medical condition while patient has been on the unit: cooperative on unit     Patient Vitals for the past 24 hrs:   Temp Pulse Resp BP   17 0745 97 °F (36.1 °C) 94 14 122/84       No results found for this or any previous visit (from the past 24 hour(s)).     Sleep:shows no evidence of impairment    Intake: nl    Patient Comments:  \" Inside mutterings have  down \"      Mental Status Exam:    Unremarkable except    Sensorium  oriented to time, place and person   Orientation person, place and time/date   Relations cooperative   Eye Contact appropriate   Appearance:  age appropriate and casually dressed   Motor Behavior:  within normal limits   Speech:  normal pitch and normal volume   Vocabulary average   Thought Process: logical   Thought Content free of delusions and hallucinations   Suicidal ideations none   Homicidal ideations none   Mood:  anxious and depressed   Affect:  anxious and depressed   Memory recent  adequate   Memory remote:  adequate   Concentration:  adequate   Abstraction:  abstract   Insight:  fair   Reliability fair   Judgment:  fair     Medications:     Medications Discontinued During This Encounter   Medication Reason    ibuprofen (MOTRIN) tablet 600 mg        Treatment Plan:  Pt relates his latest slip was due to a breakup with a girlfriend- his wife  in       Anticipated Discharge:  17  Pt hopes to return to [facility], the rehab program where he did so well     Jerel Xie MD  2017

## 2017-02-14 PROCEDURE — 74011636637 HC RX REV CODE- 636/637: Performed by: PSYCHIATRY & NEUROLOGY

## 2017-02-14 PROCEDURE — 74011250637 HC RX REV CODE- 250/637: Performed by: PSYCHIATRY & NEUROLOGY

## 2017-02-14 PROCEDURE — 65220000003 HC RM SEMIPRIVATE PSYCH

## 2017-02-14 RX ADMIN — SERTRALINE 50 MG: 50 TABLET, FILM COATED ORAL at 08:52

## 2017-02-14 RX ADMIN — PANTOPRAZOLE SODIUM 40 MG: 40 TABLET, DELAYED RELEASE ORAL at 06:33

## 2017-02-14 RX ADMIN — BENZOCAINE AND MENTHOL 1 LOZENGE: 15; 3.6 LOZENGE ORAL at 09:13

## 2017-02-14 RX ADMIN — DABIGATRAN ETEXILATE MESYLATE 150 MG: 150 CAPSULE ORAL at 20:42

## 2017-02-14 RX ADMIN — PREDNISONE 40 MG: 20 TABLET ORAL at 08:52

## 2017-02-14 RX ADMIN — DOCUSATE SODIUM 100 MG: 100 CAPSULE, LIQUID FILLED ORAL at 08:51

## 2017-02-14 RX ADMIN — PREDNISONE 40 MG: 20 TABLET ORAL at 12:12

## 2017-02-14 RX ADMIN — BUPROPION HYDROCHLORIDE 300 MG: 300 TABLET, FILM COATED, EXTENDED RELEASE ORAL at 06:33

## 2017-02-14 RX ADMIN — PREDNISONE 40 MG: 20 TABLET ORAL at 16:50

## 2017-02-14 RX ADMIN — LEVOFLOXACIN 750 MG: 750 TABLET, FILM COATED ORAL at 06:33

## 2017-02-14 RX ADMIN — DABIGATRAN ETEXILATE MESYLATE 150 MG: 150 CAPSULE ORAL at 08:51

## 2017-02-14 RX ADMIN — PANTOPRAZOLE SODIUM 40 MG: 40 TABLET, DELAYED RELEASE ORAL at 16:50

## 2017-02-14 RX ADMIN — TIOTROPIUM BROMIDE 18 MCG: 18 CAPSULE ORAL; RESPIRATORY (INHALATION) at 09:13

## 2017-02-14 RX ADMIN — ARIPIPRAZOLE 10 MG: 10 TABLET ORAL at 08:52

## 2017-02-14 NOTE — BSMART NOTE
OCCUPATIONAL THERAPY PROGRESS NOTE  Group Time:  0164  Attendance: The patient attended full group. The patient left and returned to activity at least once. Participation:  The patient participated with minimal elaboration in the activity. Attention:  The patient was able to focus on the activity. Interaction:  The patient occasionally  interacts with others. Discussed needing to change how he gets into relationships. Seems concrete at times.

## 2017-02-14 NOTE — BSMART NOTE
ART THERAPY GROUP PROGRESS NOTE    PATIENT SCHEDULED FOR GROUP AT: 14:00    ATTENDANCE: Full    PARTICIPATION LEVEL: Participates fully in the art process    ATTENTION LEVEL: Able to focus on task    FOCUS: Goals    SYMBOLIC & THEMATIC CONTENT AS NOTED IN IMAGERY: He was calm, compliant, and invested in the task at hand. His thought process was concrete and he participated minimally in group discussion. He identified how he wants to cease his substance abuse, and instead focus on hobbies of interest as well as get back into Taoism functions. He spoke fondly of his family and claimed that he has a stable support system.

## 2017-02-14 NOTE — BH NOTES
Patient has isolated in bed sleeping this shift. Pt. denies SI/HI. AV/H. Pt contracts for safety on the unit. Pt.denies any new medical/pain complaint. Pt. completed ADL's. Pt. ate 100% of meals and has been compliant with medications. Pt.  participated in community group. Pt. did not have any visitors. Staff encouraged Pt. to continue participating in treatment and  medication therapy. Pt agreed. Pt. remain free of falls and provided non skid socks. Staff will continue to monitor Pt. for behavior safety and location.

## 2017-02-14 NOTE — BH NOTES
Marlin Peguero is participating in Valentines Day Group    Group time: 30 minutes    Personal goal for participation:  Social with peers while filling out Valentines day card    Goal orientation: social    Group therapy participation: active    Therapeutic interventions reviewed and discussed:  Staff encouraged patient to fill out  Valentines day cards to love ones to decrease anxiety.     Impression of participation: Pt. fully participated in filling out Valentines Day cards for love ones

## 2017-02-14 NOTE — BH NOTES
Behavioral Health Progress Note      2/14/2017    Sedrick Khoury    Current Diagnosis: Depression with cocaine use  F 14.24      Report from the nursing staff changes in the medical condition while patient has been on the unit:  Cooperative   In groups      Patient Vitals for the past 24 hrs:   Temp Pulse Resp BP   02/14/17 0800 98 °F (36.7 °C) (!) 103 17 146/85       No results found for this or any previous visit (from the past 24 hour(s)).     Sleep:shows no evidence of impairment    Intake: nl    Patient Comments:  \" I'm alittle tired from my medicines \"     Mental Status Exam:    Unremarkable except    Sensorium  oriented to time, place and person   Orientation person, place and time/date   Relations cooperative   Eye Contact appropriate   Appearance:  age appropriate and casually dressed   Motor Behavior:  within normal limits   Speech:  normal pitch and normal volume   Vocabulary average   Thought Process: goal directed and within normal limits   Thought Content free of delusions and free of hallucinations   Suicidal ideations contracts for safety   Homicidal ideations none   Mood:  anxious and depressed   Affect:  anxious and depressed   Memory recent  adequate   Memory remote:  adequate   Concentration:  adequate   Abstraction:  abstract   Insight:  fair   Reliability fair   Judgment:  fair     Medications:     Medications Discontinued During This Encounter   Medication Reason    ibuprofen (MOTRIN) tablet 600 mg        Treatment Plan:  Continue to monitor pt's response to  Resumption of  Medication protocols     Anticipated Discharge:  Seek  Placement in  Rehab  Hopefully  Chantal Mleendrez MD  2/14/2017

## 2017-02-14 NOTE — BH NOTES
Cristian Johnson  appears calm and cooperative in the milieu socializing with staff and peers. Pt.  denies SI/HI. AV/H. Pt contracts for safety on the unit. Pt.denies any new medical/pain issues. Pt. completed ADL's. Pt. ate 100% of meals and has been compliant with medications. Pt. participate in groups. Staff encouraged Pt. to continue participating in treatment and  medication therapy. Pt agreed. Pt. remain free of falls and provided non skid socks. Staff will continue to monitor Pt. for behavior safety and location.

## 2017-02-14 NOTE — BH NOTES
GROUP THERAPY PROGRESS NOTE    [facility] is participating in [facility]. Group time: 30 minutes  Pt had attended group although sat quietly as the announcements were being discussed.

## 2017-02-15 PROCEDURE — 74011250637 HC RX REV CODE- 250/637: Performed by: PSYCHIATRY & NEUROLOGY

## 2017-02-15 PROCEDURE — 74011000250 HC RX REV CODE- 250: Performed by: PSYCHIATRY & NEUROLOGY

## 2017-02-15 PROCEDURE — 65220000003 HC RM SEMIPRIVATE PSYCH

## 2017-02-15 PROCEDURE — 74011636637 HC RX REV CODE- 636/637: Performed by: PSYCHIATRY & NEUROLOGY

## 2017-02-15 RX ORDER — ARIPIPRAZOLE 5 MG/1
5 TABLET ORAL DAILY
Status: DISCONTINUED | OUTPATIENT
Start: 2017-02-16 | End: 2017-02-21 | Stop reason: HOSPADM

## 2017-02-15 RX ADMIN — IPRATROPIUM BROMIDE AND ALBUTEROL SULFATE 3 ML: .5; 3 SOLUTION RESPIRATORY (INHALATION) at 07:34

## 2017-02-15 RX ADMIN — DOCUSATE SODIUM 100 MG: 100 CAPSULE, LIQUID FILLED ORAL at 08:07

## 2017-02-15 RX ADMIN — PANTOPRAZOLE SODIUM 40 MG: 40 TABLET, DELAYED RELEASE ORAL at 16:34

## 2017-02-15 RX ADMIN — PANTOPRAZOLE SODIUM 40 MG: 40 TABLET, DELAYED RELEASE ORAL at 06:36

## 2017-02-15 RX ADMIN — PREDNISONE 20 MG: 20 TABLET ORAL at 16:34

## 2017-02-15 RX ADMIN — PREDNISONE 40 MG: 20 TABLET ORAL at 08:08

## 2017-02-15 RX ADMIN — DABIGATRAN ETEXILATE MESYLATE 150 MG: 150 CAPSULE ORAL at 08:07

## 2017-02-15 RX ADMIN — DABIGATRAN ETEXILATE MESYLATE 150 MG: 150 CAPSULE ORAL at 21:21

## 2017-02-15 RX ADMIN — SERTRALINE 50 MG: 50 TABLET, FILM COATED ORAL at 08:08

## 2017-02-15 RX ADMIN — BUPROPION HYDROCHLORIDE 300 MG: 300 TABLET, FILM COATED, EXTENDED RELEASE ORAL at 06:36

## 2017-02-15 RX ADMIN — PREDNISONE 20 MG: 20 TABLET ORAL at 12:34

## 2017-02-15 RX ADMIN — TIOTROPIUM BROMIDE 18 MCG: 18 CAPSULE ORAL; RESPIRATORY (INHALATION) at 08:08

## 2017-02-15 RX ADMIN — ARIPIPRAZOLE 10 MG: 10 TABLET ORAL at 08:07

## 2017-02-15 NOTE — PROGRESS NOTES
Spiritual class    Observations  Quiet  Did not participate  Looked down  Said that, \"he was unable to answer the questions\"  Agree with  that it was because he had a lot of thoughts in [address] [place] [address]

## 2017-02-15 NOTE — BSMART NOTE
ART THERAPY GROUP PROGRESS NOTE    PATIENT SCHEDULED FOR GROUP AT: 14:00    ATTENDANCE: Full    PARTICIPATION LEVEL: Participates fully in the art process    ATTENTION LEVEL: Able to focus on task    FOCUS: Problem-solving/ creative expression     SYMBOLIC & THEMATIC CONTENT AS NOTED IN IMAGERY: He was calm, compliant, and invested in the task at hand. His thought process was concrete and his approach to task was simplistic in nature. He spoke about the desire to have \"innocent fun again\" and enjoy activities that don't have negative consequences, such as fishing and other personal interests.

## 2017-02-15 NOTE — BH NOTES
GROUP THERAPY PROGRESS NOTE    Marco Bhagat is participating in [facility].      Group time: 30 minutes    Personal goal for participation: discuss guideline compliance, unit issues and community announcements; discuss daily Tx goal(s)                 Goal orientation: personal    Group therapy participation: active

## 2017-02-15 NOTE — BH NOTES
Pt has been in room most of shift coming out for meals and did interact somewhat with peers on unit. He has had his breathing treatment and seems to be coughing than previous days. Will continue participation in groups   And activities. Work on [employer] in hospital by wearing non skid socks and being compliant.

## 2017-02-15 NOTE — BH NOTES
Behavioral Health Progress Note      2/15/2017    Prakash Johnson    Current Diagnosis:  F 14.24   Cocaine depression with severe use      Report from the nursing staff changes in the medical condition while patient has been on the unit: cooperative      Patient Vitals for the past 24 hrs:   Temp Pulse Resp BP   02/15/17 0800 97.8 °F (36.6 °C) 90 18 (!) 140/92   02/14/17 1200 97 °F (36.1 °C) 93 13 (!) 139/93       No results found for this or any previous visit (from the past 24 hour(s)).     Sleep: shows no evidence of impairment    Intake: nl    Patient Comments: \" I'm tired in the morning \"      Mental Status Exam:    Unremarkable except    Sensorium  oriented to time, place and person   Orientation person, place and time/date   Relations cooperative   Eye Contact appropriate   Appearance:  age appropriate, bearded and casually dressed   Motor Behavior:  within normal limits   Speech:  normal pitch and normal volume   Vocabulary average   Thought Process: logical   Thought Content Hallucinations muffled voices  Urging suicide    Suicidal ideations contracts for safety   Homicidal ideations none   Mood:  depressed   Affect:  depressed   Memory recent  adequate   Memory remote:  adequate   Concentration:  adequate   Abstraction:  concrete   Insight:  fair   Reliability fair   Judgment:  fair     Medications:     Medications Discontinued During This Encounter   Medication Reason    ibuprofen (MOTRIN) tablet 600 mg     ARIPiprazole (ABILIFY) tablet 10 mg        Treatment Plan:  Pt still has muffled internal voices urging suicide but he can control himself- but he is too tired- will lower Abilify to 5mg qam to increase likelihood of compliance after discharge      Anticipated Discharge:  2/21/17  To  John Morrissey MD  2/15/2017

## 2017-02-15 NOTE — BH NOTES
GROUP THERAPY PROGRESS NOTE    Sander Ordoñez is participating in Coping Skills Group.      Group time: 30 minutes    Personal goal for participation: participation    Goal orientation: personal    Group therapy participation: patient was encouraged to participate but did not

## 2017-02-15 NOTE — BSMART NOTE
OCCUPATIONAL THERAPY PROGRESS NOTE  Group Time:  0917  Attendance: The patient attended full group. Participation:  The patient participated with moderate elaboration in the activity. Attention:  The patient was able to focus on the activity. Interaction:  The patient occasionally  interacts with others. Affect appears brighter. Participates as asked. Pleasant on approach. Identified his brother as supportive.

## 2017-02-16 PROCEDURE — 74011000250 HC RX REV CODE- 250: Performed by: PSYCHIATRY & NEUROLOGY

## 2017-02-16 PROCEDURE — 74011636637 HC RX REV CODE- 636/637: Performed by: PSYCHIATRY & NEUROLOGY

## 2017-02-16 PROCEDURE — 65220000003 HC RM SEMIPRIVATE PSYCH

## 2017-02-16 PROCEDURE — 74011250637 HC RX REV CODE- 250/637: Performed by: PSYCHIATRY & NEUROLOGY

## 2017-02-16 RX ADMIN — PREDNISONE 20 MG: 20 TABLET ORAL at 17:13

## 2017-02-16 RX ADMIN — ARIPIPRAZOLE 5 MG: 5 TABLET ORAL at 08:41

## 2017-02-16 RX ADMIN — BUPROPION HYDROCHLORIDE 300 MG: 300 TABLET, FILM COATED, EXTENDED RELEASE ORAL at 06:37

## 2017-02-16 RX ADMIN — TIOTROPIUM BROMIDE 18 MCG: 18 CAPSULE ORAL; RESPIRATORY (INHALATION) at 10:08

## 2017-02-16 RX ADMIN — PREDNISONE 20 MG: 20 TABLET ORAL at 08:41

## 2017-02-16 RX ADMIN — PANTOPRAZOLE SODIUM 40 MG: 40 TABLET, DELAYED RELEASE ORAL at 17:13

## 2017-02-16 RX ADMIN — DABIGATRAN ETEXILATE MESYLATE 150 MG: 150 CAPSULE ORAL at 08:41

## 2017-02-16 RX ADMIN — DABIGATRAN ETEXILATE MESYLATE 150 MG: 150 CAPSULE ORAL at 20:16

## 2017-02-16 RX ADMIN — SERTRALINE 50 MG: 50 TABLET, FILM COATED ORAL at 08:41

## 2017-02-16 RX ADMIN — PREDNISONE 20 MG: 20 TABLET ORAL at 11:59

## 2017-02-16 RX ADMIN — PANTOPRAZOLE SODIUM 40 MG: 40 TABLET, DELAYED RELEASE ORAL at 06:37

## 2017-02-16 RX ADMIN — DOCUSATE SODIUM 100 MG: 100 CAPSULE, LIQUID FILLED ORAL at 08:41

## 2017-02-16 RX ADMIN — IPRATROPIUM BROMIDE AND ALBUTEROL SULFATE 3 ML: .5; 3 SOLUTION RESPIRATORY (INHALATION) at 07:37

## 2017-02-16 NOTE — PROGRESS NOTES
Problem: Suicide/Homicide (Adult/Pediatric)  Goal: *STG: Remains safe in hospital  Pt will make no attempts to harm self or others during hospitalization   Outcome: Progressing Towards Goal  No evidence of self harm or danger to others  Goal: *STG: Attends activities and groups  Pt will attend 2 groups per day during hospitalization   Outcome: Progressing Towards Goal  Pt progressing but needs to attend more groups. Pt isolated in room this pm.  Goal: *STG/LTG: Complies with medication therapy  Pt will comply with all daily prescribed medications during hospitalization   Outcome: Progressing Towards Goal  Pt compliant with medication    Comments:   Pt pleasant and cooperative. Denies thoughts of self harm. Pt isolated in his room this pm. Staff will continue to monitor pt for safety and provide 1:1 support.

## 2017-02-16 NOTE — PROGRESS NOTES
Problem: Falls - Risk of  Goal: *Absence of falls  Patient will remain free of falls every shift throughout hospital stay. Outcome: Progressing Towards Goal  Patient remains free of falls while hospitalized. Problem: Suicide/Homicide (Adult/Pediatric)  Goal: *STG: Remains safe in hospital  Pt will make no attempts to harm self or others during hospitalization   Outcome: Progressing Towards Goal  Patient remains safe while hospitalized and contracts for safety. Goal: *STG: Attends activities and groups  Pt will attend 2 groups per day during hospitalization   Outcome: Progressing Towards Goal  Patient has been attending groups today. Comments:   Patient states that he is feeling better today and he is hopeful. Patient is medication compliant and has been attending groups today. Patient has spent most of the day in the milieu but has alternated with resting in bed. Patient denies SI/HI and denies [address] Patient is calm and cooperative.

## 2017-02-16 NOTE — BSMART NOTE
ART THERAPY GROUP PROGRESS NOTE    PATIENT SCHEDULED FOR GROUP AT: 14:00    ATTENDANCE: Full    PARTICIPATION LEVEL: Participates fully in the art process    ATTENTION LEVEL: Able to focus on task    FOCUS: Support     SYMBOLIC & THEMATIC CONTENT AS NOTED IN IMAGERY: He was calm and kept to himself unless directly prompted. His responses were slightly delayed and some-what sedated. His thought process and associations were concrete. He claimed that his family is supportive and he also identified personal interests as a means of support, i.e. Fishing, cooking, and hunting.

## 2017-02-16 NOTE — BH NOTES
GROUP THERAPY PROGRESS NOTE    Liliana Palm is participating in Anger Management.      Group time: 30 minutes    Personal goal for participation: learn to control anger    Goal orientation: personal    Group therapy participation: active    Therapeutic interventions reviewed and discussed:     Impression of participation: active

## 2017-02-16 NOTE — BSMART NOTE
SW Contact:  Spoke with pt regarding his insurance that doesn't cover facility he wanted to go to & gave him info for [facility] in [place] #[phone]. Pt will try to call them today to start process.

## 2017-02-16 NOTE — BH NOTES
Bill Nova is participating in [employer]. Group time: 15 minutes    Personal goal for participation: Community Concerns    Goal orientation: community    Group therapy participation: active    Therapeutic interventions reviewed and discussed: Staff encouraged Pt. To report any repairs or Community Concerns    Impression of participation: Pt.  Had no repairs to report/community concerns at this time

## 2017-02-16 NOTE — BH NOTES
Patient ate dinner and attend groups. Patient appeared to be calm on the unit. Patient interacted with other patients. Patient involved in no falls this shift, Skid proof footwear utilized. Patient attend [place] [id number] /NA group. Patient took night time medications. Patient is safe on the unit.

## 2017-02-17 PROCEDURE — 74011250637 HC RX REV CODE- 250/637: Performed by: PSYCHIATRY & NEUROLOGY

## 2017-02-17 PROCEDURE — 65220000003 HC RM SEMIPRIVATE PSYCH

## 2017-02-17 PROCEDURE — 74011000250 HC RX REV CODE- 250: Performed by: PSYCHIATRY & NEUROLOGY

## 2017-02-17 PROCEDURE — 74011636637 HC RX REV CODE- 636/637: Performed by: PSYCHIATRY & NEUROLOGY

## 2017-02-17 RX ADMIN — PANTOPRAZOLE SODIUM 40 MG: 40 TABLET, DELAYED RELEASE ORAL at 06:40

## 2017-02-17 RX ADMIN — TIOTROPIUM BROMIDE 18 MCG: 18 CAPSULE ORAL; RESPIRATORY (INHALATION) at 09:10

## 2017-02-17 RX ADMIN — PANTOPRAZOLE SODIUM 40 MG: 40 TABLET, DELAYED RELEASE ORAL at 17:25

## 2017-02-17 RX ADMIN — BENZOCAINE AND MENTHOL 1 LOZENGE: 15; 3.6 LOZENGE ORAL at 10:07

## 2017-02-17 RX ADMIN — ARIPIPRAZOLE 5 MG: 5 TABLET ORAL at 09:10

## 2017-02-17 RX ADMIN — PREDNISONE 20 MG: 20 TABLET ORAL at 09:09

## 2017-02-17 RX ADMIN — PREDNISONE 20 MG: 20 TABLET ORAL at 11:39

## 2017-02-17 RX ADMIN — PREDNISONE 20 MG: 20 TABLET ORAL at 17:25

## 2017-02-17 RX ADMIN — SERTRALINE 50 MG: 50 TABLET, FILM COATED ORAL at 09:09

## 2017-02-17 RX ADMIN — TRAZODONE HYDROCHLORIDE 50 MG: 50 TABLET ORAL at 21:29

## 2017-02-17 RX ADMIN — DABIGATRAN ETEXILATE MESYLATE 150 MG: 150 CAPSULE ORAL at 09:09

## 2017-02-17 RX ADMIN — DOCUSATE SODIUM 100 MG: 100 CAPSULE, LIQUID FILLED ORAL at 09:09

## 2017-02-17 RX ADMIN — BUPROPION HYDROCHLORIDE 300 MG: 300 TABLET, FILM COATED, EXTENDED RELEASE ORAL at 06:40

## 2017-02-17 RX ADMIN — IPRATROPIUM BROMIDE AND ALBUTEROL SULFATE 3 ML: .5; 3 SOLUTION RESPIRATORY (INHALATION) at 07:19

## 2017-02-17 RX ADMIN — DABIGATRAN ETEXILATE MESYLATE 150 MG: 150 CAPSULE ORAL at 21:27

## 2017-02-17 NOTE — PROGRESS NOTES
NUTRITION    Nutrition Screen      RECOMMENDATIONS / PLAN:     - No nutrition intervention indicated at this time. Will re-screen as appropriate. NUTRITION DIAGNOSIS & INTERVENTIONS:     Nutrition Diagnosis:  No nutrition diagnosis at this time    ASSESSMENT:     Subjective:  Patient has good appetite and meal intake per nursing. Current Appetite:   [x] Good     [] Fair     [] Poor     [] Other:  Appetite/meal intake prior to admission:   [] Good     [] Fair     [] Poor     [x] Other: unknown     Diet: DIET REGULAR    Current Meal Intake: No data found. Average intake adequate to meet patients estimated nutritional needs:   [x] Yes     [] No  Food Allergies: shellfish  Chewing/Swallowing Difficulty:  [x] None known     [] Yes:    Gastrointestinal Issues:  [] Yes    [x] No   Skin Integrity:  WDL    Pertinent Medications:  Reviewed   Labs:  Reviewed     Anthropometrics:  Ht Readings from Last 1 Encounters:   02/11/17 6' 1\" (1.854 m)       Last 3 Recorded Weights in this Encounter    02/11/17 1215   Weight: 87.5 kg (193 lb)       Body mass index is 25.46 kg/(m^2).       Weight History:   Weight Metrics 2/11/2017 2/9/2017 11/22/2016 2/27/2016 12/12/2015 5/2/2015 4/17/2013   Weight 193 lb 198 lb 195 lb 200 lb 215 lb 214 lb 215 lb   BMI 25.46 kg/m2 29.24 kg/m2 28.8 kg/m2 26.39 kg/m2 28.37 kg/m2 28.24 kg/m2 28.37 kg/m2       Admitting Diagnosis: Major Depressive Disorder  Major depressive disorder, recurrent episode, severe (HCC)  Past Medical History   Diagnosis Date    Alcohol abuse     Anxiety     Asthma     Bronchitis     Chronic deep vein thrombosis (DVT) ([facility] [address] 07/2013 07/2013, 12/2014 ([facility])    Cocaine abuse     COPD     Depression     GERD (gastroesophageal reflux disease)     Hyperlipidemia     Hypertension     Major depression     Mild diastolic dysfunction     NEIL on CPAP     Renal insufficiency     Suicidal ideation     Vitamin D deficiency        Education Needs:        [x] None identified  [] Identified - Not appropriate at this time  []  Identified and addressed - refer to education log  Learning Limitations:   [x] None identified  [] Identified    Cultural, Islam & ethnic food preferences identified:  [x] None    [] Yes      ESTIMATED NUTRITION NEEDS:     1936-4724 kcal (MSJx1.2-1.3), 70-88 gm protein (0.8-1 gm/kg), 1 mL/kcal         Based on:  88 kg       [x] Actual BW      [] IBW       []  Adjusted BW       MONITORING & EVALUATION:     Nutrition Goal(s):   1. Po intake of meals will meet >75% of patient estimated nutritional needs within the next 5-7 days.      Previous Goal(s) Outcome (for follow-up assessments only):   [] Met    []  Not Met     Monitoring:  [x] Monitor po intake  [x] Monitor diet tolerance      Previous Recommendations (for follow-up assessments only):     []   Implemented       []   Not Implemented (RD to address)             Discharge Planning: regular diet   [x]  Participated in care planning, discharge planning, & interdisciplinary rounds as appropriate      Barry Back, [address] [place] [address]   Pager: [phone]

## 2017-02-17 NOTE — BH NOTES
GROUP THERAPY PROGRESS NOTE    Charlee Pascual is participating in [address] Group    Group time: 1hour  Personal goal for participation:  Seek information on Alcohol      Goal orientation: active  Group therapy participation:   Fully participated    Therapeutic interventions reviewed and discussed: Staff encouraged Pt. To participate in Group    Impression of participation:  [address] members reviewed a film, then held an open discussion with Pt.  Pt. Brandin Davis and received feedback while in group

## 2017-02-17 NOTE — BH NOTES
GROUP THERAPY PROGRESS NOTE    Vianney Laughlin is participating in Substance abuse. Group time: 2 hours    Goal orientation: personal    Group therapy participation: active    Therapeutic interventions reviewed and discussed:   Video - \"\"My Name is Rosi Patton MANNIE\"    Impression of participation:   Patient attentively watched the film.

## 2017-02-17 NOTE — BSMART NOTE
OCCUPATIONAL THERAPY PROGRESS NOTE  Group Time:  2085  Attendance: The patient attended full group. Participation:  The patient participated with minimal elaboration in the activity. .  Attention:  The patient was able to focus on the activity. Interaction:  The patient acknowledges others or responds to questions,  with no spontaneous interaction. Stated he might be going to rehab in [place]. Quiet and more subdued today. On questioning, patient stated he didn't feel well and his throat hurt (voice a little raspy). Advised to discuss with nurse.

## 2017-02-17 NOTE — BSMART NOTE
SW Contact:  Pt did his assignment from yesterday & did call Loretta Garcia # [phone] this AM. He was upbeat about the information he received & would like us to pursue this option. He feels that \"Kalee\" he spoke to in intake did a mini intial interview with him. She will coordinate more with us.

## 2017-02-17 NOTE — BH NOTES
Patient woke up cheerful this morning. BP was high after breathing treatment, but returned to normal before lunch. PT has a great appetite-ate breakfast and lunch. He's very easy to communicate with-has a very calm disposition. Enjoys group, but not a lot of one-on-one activities. Patient spent much of the day sitting quietly and talking to other patients when a conversation was initiated. Patient jokes around with staff and patients. States he'll be discharged NLT  next Tuesday. Patient states that he will be very excited if he's able to be admitted into a 28-day rehabilitation program in [place]. Patient complies with all guidelines at the facility.

## 2017-02-17 NOTE — BH NOTES
GROUP THERAPY PROGRESS NOTE    Shantelle Caldwell is participating in [address]. Group time: 30 minutes    Personal goal for participation: Listened to community guidelines and procedure for   discharges.     Goal orientation: personal    Group therapy participation: minimal    Therapeutic interventions reviewed and discussed:     Impression of participation: Sat quietly in group appeared to be listeneing

## 2017-02-17 NOTE — BSMART NOTE
SW Contact:  Collateral; [facility]oke to [facility] intake at [facility] [phone] & gave her clinical, insurance & logistics regarding pt. .. They will call Adult Unit on Monday 2/20/17 at 10AM for his interview. Pt & Charge nurse given this info.

## 2017-02-17 NOTE — BH NOTES
Behavioral Health Progress Note      2/17/2017    Vianney Laughlin    Current Diagnosis:  Cocaine depression, severe use   F  14.24      Report from the nursing staff changes in the medical condition while patient has been on the unit: cooperative and participating      Patient Vitals for the past 24 hrs:   Temp Pulse Resp BP   02/17/17 0800 96.5 °F (35.8 °C) 93 20 (!) 142/100       No results found for this or any previous visit (from the past 24 hour(s)).     Sleep:shows no evidence of impairment    Intake: nl    Patient Comments:  \" I'm less tired on less Abilify and the voices are receeding anyway\"      Mental Status Exam:    Unremarkable except    Sensorium  oriented to time, place and person   Orientation person, place and time/date   Relations cooperative   Eye Contact appropriate   Appearance:  age appropriate   Motor Behavior:  within normal limits   Speech:  normal pitch and normal volume   Vocabulary average   Thought Process: logical   Thought Content free of delusions and free of hallucinations   Suicidal ideations none and contracts for safety   Homicidal ideations none   Mood:  anxious and depressed   Affect:  anxious and depressed   Memory recent  adequate   Memory remote:  adequate   Concentration:  adequate   Abstraction:  abstract   Insight:  fair   Reliability fair   Judgment:  fair     Medications:    Medications Discontinued During This Encounter   Medication Reason    ibuprofen (MOTRIN) tablet 600 mg     ARIPiprazole (ABILIFY) tablet 10 mg        Treatment Plan:  Pt doing better on reduction of Abilify from 10 to 5 mg      Anticipated Discharge:  2/22/17   Pt excited to be pursuing placement  @ [facility] [place] [address] Naeem Contreras MD  2/17/2017

## 2017-02-17 NOTE — BSMART NOTE
OCCUPATIONAL THERAPY PROGRESS NOTE  Group Time:  1343  Attendance: The patient attended full group. Participation:  The patient participated fully in the activity. Attention:  The patient was able to focus on the activity. Interaction:  The patient occasionally  interacts with others. Mood seemed brighter. Said he was feeling better.

## 2017-02-18 PROCEDURE — 74011250637 HC RX REV CODE- 250/637: Performed by: PSYCHIATRY & NEUROLOGY

## 2017-02-18 PROCEDURE — 65220000003 HC RM SEMIPRIVATE PSYCH

## 2017-02-18 PROCEDURE — 74011636637 HC RX REV CODE- 636/637: Performed by: PSYCHIATRY & NEUROLOGY

## 2017-02-18 RX ADMIN — DABIGATRAN ETEXILATE MESYLATE 150 MG: 150 CAPSULE ORAL at 20:53

## 2017-02-18 RX ADMIN — PANTOPRAZOLE SODIUM 40 MG: 40 TABLET, DELAYED RELEASE ORAL at 06:41

## 2017-02-18 RX ADMIN — SERTRALINE 50 MG: 50 TABLET, FILM COATED ORAL at 08:51

## 2017-02-18 RX ADMIN — DOCUSATE SODIUM 100 MG: 100 CAPSULE, LIQUID FILLED ORAL at 08:51

## 2017-02-18 RX ADMIN — PREDNISONE 20 MG: 20 TABLET ORAL at 08:51

## 2017-02-18 RX ADMIN — TRAZODONE HYDROCHLORIDE 50 MG: 50 TABLET ORAL at 20:53

## 2017-02-18 RX ADMIN — TIOTROPIUM BROMIDE 18 MCG: 18 CAPSULE ORAL; RESPIRATORY (INHALATION) at 08:54

## 2017-02-18 RX ADMIN — BUPROPION HYDROCHLORIDE 300 MG: 300 TABLET, FILM COATED, EXTENDED RELEASE ORAL at 06:29

## 2017-02-18 RX ADMIN — ARIPIPRAZOLE 5 MG: 5 TABLET ORAL at 08:51

## 2017-02-18 RX ADMIN — PANTOPRAZOLE SODIUM 40 MG: 40 TABLET, DELAYED RELEASE ORAL at 16:14

## 2017-02-18 RX ADMIN — BENZOCAINE AND MENTHOL 1 LOZENGE: 15; 3.6 LOZENGE ORAL at 07:46

## 2017-02-18 RX ADMIN — DABIGATRAN ETEXILATE MESYLATE 150 MG: 150 CAPSULE ORAL at 08:51

## 2017-02-18 NOTE — BH NOTES
GROUP THERAPY PROGRESS NOTE    Holli Tong is participating in Recreational Therapy. Group time: 45 minutes    Personal goal for participation: The Pt was offered group, but refused to attend. Goal orientation: relaxation    Group therapy participation: The Pt did not attend group. Therapeutic interventions reviewed and discussed:     Impression of participation: The Pt did not attend group. The Pt rested in his room, because he was not feeling well physically.

## 2017-02-18 NOTE — PROGRESS NOTES
Problem: Falls - Risk of  Goal: *Absence of falls  Patient will remain free of falls every shift throughout hospital stay. Outcome: Progressing Towards Goal  No fall observed    Problem: Suicide/Homicide (Adult/Pediatric)  Goal: *STG: Remains safe in hospital  Pt will make no attempts to harm self or others during hospitalization   Outcome: Progressing Towards Goal  Patient contracts for safety this shift  Goal: *STG: Seeks staff when feelings of self harm or harm towards others arise  Pt will verbally contract with staff each shift during hospitalization   Outcome: Progressing Towards Goal  Denies thoughts to harm self or others this shift      Goal: *STG: Attends activities and groups  Pt will attend 2 groups per day during hospitalization   Outcome: Progressing Towards Goal  Patient participated in group this shift  Goal: *STG/LTG: Complies with medication therapy  Pt will comply with all daily prescribed medications during hospitalization   Outcome: Progressing Towards Goal  Patient received medications as prescribed    Comments:   Patient cooperative and pleasant, contracts for safety, denies thoughts to harm self or others. Patient sat in the day area for most part of the shift interacting with peers, watched TV, participated in groups. Patient ate 100% dinner, received medications as prescribed. Patient utilize non skid footwear for safety.  Nursing will continue to monitor

## 2017-02-18 NOTE — BH NOTES
GROUP THERAPY PROGRESS NOTE    Lobo Hartley is participating in [place]. Group time: 30 minutes    Personal goal for participation: \"Call my sister and let her know where I am.\"    Goal orientation: community    Group therapy participation: active    Therapeutic interventions reviewed and discussed: Unit guidelines/ daily goals group. Impression of participation: Patient participated in group discussion.

## 2017-02-18 NOTE — PROGRESS NOTES
Problem: Falls - Risk of  Goal: *Absence of falls  Patient will remain free of falls every shift throughout hospital stay. Outcome: Progressing Towards Goal  Pt remains free from fall. Pt is ambulating well without a walker or wheelchair. Pt wearing antiskid socks upon ambulation. Problem: Suicide/Homicide (Adult/Pediatric)  Goal: *STG: Remains safe in hospital  Pt will make no attempts to harm self or others during hospitalization   Outcome: Progressing Towards Goal  Remains safe at this time. Goal: *STG/LTG: Complies with medication therapy  Pt will comply with all daily prescribed medications during hospitalization   Outcome: Progressing Towards Goal  Remains compliant with medication regimen. Comments:   Pt sitting in day area watching TV at this time. Upon assessment pt stated that he is not depressed at this moment. Pt stated that upon discharge he wants to go to a rehab facility. Pt stated that he wants to stop using cocaine. Denies pain and SI-HI at this time.

## 2017-02-18 NOTE — BH NOTES
MHT Note:  The Pt was seen in the day area the majority of the shift interacting with staff and his peers with little to no problem. The Pt did eat all of his meals and snack. The Pt attend group and participated fully. The Pt did not have any visitors or phone calls during this shift. The Pt stated he is very hopeful about going to a S/A treatment center upon d/c. The Pt stated he really wants to \"get on the right track\". The Pt denies any SI/HI and contracts for safety. The Pt was reminded to keep socks and/or shoes on his feet to avoid slips and/or falls.

## 2017-02-19 PROCEDURE — 74011250637 HC RX REV CODE- 250/637: Performed by: PSYCHIATRY & NEUROLOGY

## 2017-02-19 PROCEDURE — 65220000003 HC RM SEMIPRIVATE PSYCH

## 2017-02-19 PROCEDURE — 74011000250 HC RX REV CODE- 250: Performed by: PSYCHIATRY & NEUROLOGY

## 2017-02-19 RX ADMIN — SERTRALINE 50 MG: 50 TABLET, FILM COATED ORAL at 08:44

## 2017-02-19 RX ADMIN — BUPROPION HYDROCHLORIDE 300 MG: 300 TABLET, FILM COATED, EXTENDED RELEASE ORAL at 06:05

## 2017-02-19 RX ADMIN — ARIPIPRAZOLE 5 MG: 5 TABLET ORAL at 08:44

## 2017-02-19 RX ADMIN — DOCUSATE SODIUM 100 MG: 100 CAPSULE, LIQUID FILLED ORAL at 08:44

## 2017-02-19 RX ADMIN — PANTOPRAZOLE SODIUM 40 MG: 40 TABLET, DELAYED RELEASE ORAL at 07:07

## 2017-02-19 RX ADMIN — TIOTROPIUM BROMIDE 18 MCG: 18 CAPSULE ORAL; RESPIRATORY (INHALATION) at 08:46

## 2017-02-19 RX ADMIN — IPRATROPIUM BROMIDE AND ALBUTEROL SULFATE 3 ML: .5; 3 SOLUTION RESPIRATORY (INHALATION) at 07:45

## 2017-02-19 RX ADMIN — PANTOPRAZOLE SODIUM 40 MG: 40 TABLET, DELAYED RELEASE ORAL at 16:20

## 2017-02-19 RX ADMIN — TRAZODONE HYDROCHLORIDE 50 MG: 50 TABLET ORAL at 20:39

## 2017-02-19 RX ADMIN — DABIGATRAN ETEXILATE MESYLATE 150 MG: 150 CAPSULE ORAL at 20:39

## 2017-02-19 RX ADMIN — DABIGATRAN ETEXILATE MESYLATE 150 MG: 150 CAPSULE ORAL at 08:44

## 2017-02-19 NOTE — BH NOTES
GROUP THERAPY PROGRESS NOTE    Rachel Mcnamara is participating in Get to know me Group. Group time: 30 minutes    Personal goal for participation: Getting to know me    Goal orientation: social    Group therapy participation: active    Therapeutic interventions reviewed and discussed:   The most important thing I want you to know about me is:    Impression of participation: Nothing

## 2017-02-19 NOTE — BH NOTES
The pt was discussed with nursing staff and seen during rounds. The pt states, \"I'm doing pretty good. \"  He denies cravings for cocaine. He does report having experienced dreams of using. He continues to report motivation to remain abstinent from substances. The pt reports anxiously awaiting a decision regarding long-term treatment in [place]. He denies SI and HI. He also denies psychosis. MSE-  73yo AAM.  Leslie Collado stated age. Cooperative. Speech is normal in rate, volume, and tone. No abnormal movements appreciated on exam.  Stated mood is, \"I'm in a pretty good mood. \"  Affect is full. Thoughts are goal-directed. Denies SI and HI. Denies psychosis. Insight and judgment appear to be improving. A/P-Cocaine induced depressive disorder; Cocaine use disorder, severe  1. Continue Wellbutrin XL 300mg daily. 2. Continue Zoloft 50mg daily. 3. Continue Abilify 5mg daily. 4. Continue hospitalization.

## 2017-02-19 NOTE — BH NOTES
The pt was discussed with nursing staff and seen during rounds. He reports having slept well. He does report feeling anxious about finding out the decision regarding placement for continued treatment in [place]. He denies SI and HI. He also denies significant cravings for drugs.      MSE-  73yo AAM. Appears stated age. Cooperative. Speech is normal in rate, volume, and tone. No abnormal movements appreciated on exam. Stated mood is, \"Oh, good. \" Affect is full. Thoughts are goal-directed. Denies SI and HI. Denies psychosis. Insight and judgment appear to be fair.      A/P-Cocaine induced depressive disorder; Cocaine use disorder, severe  1. Continue Wellbutrin XL 300mg daily. 2. Continue Zoloft 50mg daily. 3. Continue Abilify 5mg daily. 4. Continue hospitalization.

## 2017-02-19 NOTE — BH NOTES
Sander Ordoñez is participating in [employer]. Group time: 15 minutes    Personal goal for participation: Community Concerns    Goal orientation: community    Group therapy participation: active    Therapeutic interventions reviewed and discussed: Staff encouraged Pt. To report any repairs or Community Concerns    Impression of participation: Pt. Complained unit is too hot.

## 2017-02-19 NOTE — BH NOTES
GROUP THERAPY PROGRESS NOTE    Leah Brown is participating in Leisure-Creative Group.      Group time: 30 minutes    Personal goal for participation: participated in group     Goal orientation: relaxation    Group therapy participation: active    Therapeutic interventions reviewed and discussed: recreation     Impression of participation: Patient was engaged in group

## 2017-02-19 NOTE — BH NOTES
Delphine Smiles  \"Im too old to be living like this. \"    Patient has been polite and cooperative in the milieu socializing with staff and peers. Pt. denies SI/HI. AV/H. Pt contracts for safety on the unit. Pt.denies any new medical/pain complaints. Pt. completed ADL's. Pt. ate 100% of meals and has been compliant with medications. Pt.  Participated in community/recreation group. Pt. did not have any visitors. Staff encouraged Pt. to continue participating in treatment and  medication therapy. Pt agreed. Pt. remain free of falls and provided non skid socks. Staff will continue to monitor Pt. for behavior safety and location.

## 2017-02-19 NOTE — BH NOTES
Lobo Stevenon is participating in  [place]. Group time: 30 minutes    Personal goal for participation: Change is self Awareness    Goal orientation: social    Group therapy participation: active    Therapeutic interventions reviewed and discussed: \"To solve any problem, You must Change. \"    Impression of participation: What specifically do I want to change about me?   Change people I hang around

## 2017-02-20 PROCEDURE — 65220000003 HC RM SEMIPRIVATE PSYCH

## 2017-02-20 PROCEDURE — 74011250637 HC RX REV CODE- 250/637: Performed by: PSYCHIATRY & NEUROLOGY

## 2017-02-20 RX ADMIN — SERTRALINE 50 MG: 50 TABLET, FILM COATED ORAL at 09:01

## 2017-02-20 RX ADMIN — PANTOPRAZOLE SODIUM 40 MG: 40 TABLET, DELAYED RELEASE ORAL at 15:56

## 2017-02-20 RX ADMIN — DABIGATRAN ETEXILATE MESYLATE 150 MG: 150 CAPSULE ORAL at 09:00

## 2017-02-20 RX ADMIN — DOCUSATE SODIUM 100 MG: 100 CAPSULE, LIQUID FILLED ORAL at 09:01

## 2017-02-20 RX ADMIN — ARIPIPRAZOLE 5 MG: 5 TABLET ORAL at 09:01

## 2017-02-20 RX ADMIN — PANTOPRAZOLE SODIUM 40 MG: 40 TABLET, DELAYED RELEASE ORAL at 06:30

## 2017-02-20 RX ADMIN — DABIGATRAN ETEXILATE MESYLATE 150 MG: 150 CAPSULE ORAL at 20:37

## 2017-02-20 RX ADMIN — TIOTROPIUM BROMIDE 18 MCG: 18 CAPSULE ORAL; RESPIRATORY (INHALATION) at 09:03

## 2017-02-20 RX ADMIN — BUPROPION HYDROCHLORIDE 300 MG: 300 TABLET, FILM COATED, EXTENDED RELEASE ORAL at 06:31

## 2017-02-20 RX ADMIN — TRAZODONE HYDROCHLORIDE 50 MG: 50 TABLET ORAL at 20:37

## 2017-02-20 NOTE — BH NOTES
Leah Brown is participating in Coping Skills Group. Group time: 30 minutes    Personal goal for participation: decrease anger    Goal orientation: social    Group therapy participation: active    Therapeutic interventions reviewed and discussed: Anger Map    Impression of participation: Pt.  Discuss  Coping skills to decrease  anger

## 2017-02-20 NOTE — BSMART NOTE
SW Contact:  Did basic update with pt on his commitment to residential C D Program. \"Nothing changed, I know I need to do this\". Admitted to some anxiety today & over the weekend in anticipation of today's interview. Briefly reviewed strategies to manage worry using basic CBT principles.

## 2017-02-20 NOTE — BSMART NOTE
OCCUPATIONAL THERAPY PROGRESS NOTE  Group Time:  2745  Attendance: The patient attended full group. Participation:  The patient participated with moderate elaboration in the activity. Attention:  The patient was able to focus on the activity. Interaction:  The patient occasionally  interacts with others. Seemed please to be going for long term treatment tomorrow.

## 2017-02-20 NOTE — PROGRESS NOTES
Problem: Falls - Risk of  Goal: *Absence of falls  Patient will remain free of falls every shift throughout hospital stay. Outcome: Progressing Towards Goal  Patient utilize non skid footwear for safety. No fall observed    Problem: Suicide/Homicide (Adult/Pediatric)  Goal: *STG: Seeks staff when feelings of self harm or harm towards others arise  Pt will verbally contract with staff each shift during hospitalization   Outcome: Progressing Towards Goal  Patient denies thoughts/feelings to harm self or others  Goal: *STG: Attends activities and groups  Pt will attend 2 groups per day during hospitalization   Outcome: Progressing Towards Goal  Patient attended and participated in groups and unit activities this shift  Goal: *STG/LTG: Complies with medication therapy  Pt will comply with all daily prescribed medications during hospitalization   Outcome: Progressing Towards Goal  Patient received medications as prescribed    Comments:   Patient cooperative and pleasant, interacted with peers and staff. Patient denies thoughts to harm self or others and contracts for safety. He ate 100% dinner, attended and participated in unit group and activities this shift. Patient received medications as prescribed, will continue to monitor for safety.

## 2017-02-20 NOTE — BH NOTES
Mary Elkins is participating in [employer]. Group time: 15 minutes    Personal goal for participation: Community Concerns    Goal orientation: community    Group therapy participation: active    Therapeutic interventions reviewed and discussed: Staff encouraged Pt. To report any repairs or Community Concerns    Impression of participation: Pt.  Had no repairs to report/community concerns at this time

## 2017-02-20 NOTE — BSMART NOTE
SW Contact:  Collateral Contact: Pt WAS ACCEPTED in the x30 program at [facility] in [place]. They are making arrangements:  Tuesday 2/21/17 @2pm... Leave [facility].                  2/22/17 @1:15pm... [facility]  They will call with Confirmation # by Monday evening.

## 2017-02-20 NOTE — BH NOTES
GROUP THERAPY PROGRESS NOTE    Rachel Anders is participating in [place].      Group time: 30 minutes    Personal goal for participation: discuss guideline compliance, unit issues and community announcements; discuss daily Tx goal(s)                 Goal orientation: personal    Group therapy participation: active

## 2017-02-20 NOTE — PROGRESS NOTES
Problem: Falls - Risk of  Goal: *Absence of falls  Patient will remain free of falls every shift throughout hospital stay. Outcome: Progressing Towards Goal  Patient remains free of falls while hospitalized. Problem: Suicide/Homicide (Adult/Pediatric)  Goal: *STG: Seeks staff when feelings of self harm or harm towards others arise  Pt will verbally contract with staff each shift during hospitalization   Outcome: Progressing Towards Goal  Patient contracts for safety. Goal: *STG/LTG: Complies with medication therapy  Pt will comply with all daily prescribed medications during hospitalization   Outcome: Progressing Towards Goal  Patient is compliant with medications. Comments:   Patient states that he is doing better; denies SI/HI. Patient received a phone call from [facility] today in hopes of getting into rehab there. Patient is compliant with medications and contracts for safety.

## 2017-02-20 NOTE — BH NOTES
Progress Note       Mental Status    -  Pt reports he is stable on med's  And sleep  Is good. Pt on less Abilify so there is less  Fatigue and he  Is  Still not hearing voices.   S/H ideation denied      Accepted  @  [facility]

## 2017-02-21 VITALS
RESPIRATION RATE: 16 BRPM | HEART RATE: 95 BPM | HEIGHT: 73 IN | DIASTOLIC BLOOD PRESSURE: 73 MMHG | SYSTOLIC BLOOD PRESSURE: 105 MMHG | TEMPERATURE: 97.3 F | WEIGHT: 193 LBS | BODY MASS INDEX: 25.58 KG/M2

## 2017-02-21 PROCEDURE — 74011250637 HC RX REV CODE- 250/637: Performed by: PSYCHIATRY & NEUROLOGY

## 2017-02-21 PROCEDURE — 74011000250 HC RX REV CODE- 250: Performed by: PSYCHIATRY & NEUROLOGY

## 2017-02-21 RX ORDER — ARIPIPRAZOLE 5 MG/1
5 TABLET ORAL DAILY
Qty: 30 TAB | Refills: 1 | Status: SHIPPED | OUTPATIENT
Start: 2017-02-21

## 2017-02-21 RX ORDER — DABIGATRAN ETEXILATE 150 MG/1
150 CAPSULE ORAL EVERY 12 HOURS
Qty: 60 CAP | Refills: 1 | Status: SHIPPED | OUTPATIENT
Start: 2017-02-21

## 2017-02-21 RX ORDER — SERTRALINE HYDROCHLORIDE 50 MG/1
50 TABLET, FILM COATED ORAL DAILY
Qty: 30 TAB | Refills: 1 | Status: SHIPPED | OUTPATIENT
Start: 2017-02-21 | End: 2017-03-23

## 2017-02-21 RX ORDER — BUPROPION HYDROCHLORIDE 300 MG/1
300 TABLET ORAL
Qty: 30 TAB | Refills: 0 | Status: SHIPPED | OUTPATIENT
Start: 2017-02-21 | End: 2017-03-23

## 2017-02-21 RX ADMIN — IPRATROPIUM BROMIDE AND ALBUTEROL SULFATE 3 ML: .5; 3 SOLUTION RESPIRATORY (INHALATION) at 08:24

## 2017-02-21 RX ADMIN — DOCUSATE SODIUM 100 MG: 100 CAPSULE, LIQUID FILLED ORAL at 09:39

## 2017-02-21 RX ADMIN — SERTRALINE 50 MG: 50 TABLET, FILM COATED ORAL at 09:39

## 2017-02-21 RX ADMIN — TIOTROPIUM BROMIDE 18 MCG: 18 CAPSULE ORAL; RESPIRATORY (INHALATION) at 11:12

## 2017-02-21 RX ADMIN — ARIPIPRAZOLE 5 MG: 5 TABLET ORAL at 09:40

## 2017-02-21 RX ADMIN — PANTOPRAZOLE SODIUM 40 MG: 40 TABLET, DELAYED RELEASE ORAL at 07:34

## 2017-02-21 RX ADMIN — DABIGATRAN ETEXILATE MESYLATE 150 MG: 150 CAPSULE ORAL at 09:40

## 2017-02-21 RX ADMIN — BUPROPION HYDROCHLORIDE 300 MG: 300 TABLET, FILM COATED, EXTENDED RELEASE ORAL at 07:34

## 2017-02-21 NOTE — DISCHARGE INSTRUCTIONS
BEHAVIORAL HEALTH NURSING DISCHARGE NOTE      The following personal items collected during your admission are returned to you:   Dental Appliance: Dental Appliances: None  Vision: Visual Aid: None  Hearing Aid:    Jewelry: Jewelry: None  Clothing: Clothing: Belt, Gloves, [place], Jacket/Coat, Pants, [place], [place], [employer], Sweater, Undergarments  Other Valuables: Other Valuables: Cell Phone, Cigarettes  Valuables sent to safe: Personal Items Sent to Safe:  (1 wallet containing 95dollars and 1 bank card)      PATIENT INSTRUCTIONS:      Regular diet. The discharge information has been reviewed with the patient. The patient verbalized understanding. [employer] Activation    Thank you for requesting access to [employer]. Please follow the instructions below to securely access and download your online medical record. [employer] allows you to send messages to your doctor, view your test results, renew your prescriptions, schedule appointments, and more. How Do I Sign Up? 1. In your internet browser, go to [email or web address]  2. Click on the First Time User? Click Here link in the Sign In box. You will be redirect to the New Member Sign Up page. 3. Enter your [employer] Access Code exactly as it appears below. You will not need to use this code after youve completed the sign-up process. If you do not sign up before the expiration date, you must request a new code. [employer] Access Code: [email or web address]  Expires: 2017 12:12 PM (This is the date your [employer] access code will )    4. Enter the last four digits of your Social Security Number (xxxx) and Date of Birth (mm/dd/yyyy) as indicated and click Submit. You will be taken to the next sign-up page. 5. Create a [employer] ID. This will be your [employer] login ID and cannot be changed, so think of one that is secure and easy to remember. 6. Create a [employer] password. You can change your password at any time.   7. Enter your Password Reset Question and Answer. This can be used at a later time if you forget your password. 8. Enter your e-mail address. You will receive e-mail notification when new information is available in [address] 9. Click Sign Up. You can now view and download portions of your medical record. 10. Click the Download Summary menu link to download a portable copy of your medical information. Additional Information    If you have questions, please visit the Frequently Asked Questions section of the [employer] website at [email or web address] Remember, [employer] is NOT to be used for urgent needs. For medical emergencies, dial 911.       Patient armband removed and shredded

## 2017-02-21 NOTE — BSMART NOTE
SW Contact:  Briefly met with pt to support his upcoming start in x30 / day tx in [place] as well as his Bus Trip starting this afternoon. Reviewed what is normal anxiety as well as strategies to keep positive focus during trip & while in treatment program. Transportation to bus [facility] has been arranged by nursing staff.

## 2017-02-21 NOTE — DISCHARGE SUMMARY
[facility]  Discharge Summary     Patient ID:  rPakash Johnson  [id number]  44 y.o.  1950    Admit date: 2/10/2017    Discharge date and time:2/21/17 1000     Admission Diagnoses: Major Depressive Disorder  Major depressive disorder, recurrent episode, severe ([facility]    Discharge Diagnoses:   Problem List as of 2/21/2017  Date Reviewed: 12/14/2015          Codes Class Noted - Resolved    Major depressive disorder, recurrent episode, severe ([facility] ICD-10-CM: F33.2  ICD-9-CM: 296.33  2/10/2017 - Present        Essential hypertension ICD-10-CM: I10  ICD-9-CM: 401.9  2/9/2017 - Present        Vitamin D deficiency ICD-10-CM: E55.9  ICD-9-CM: 268.9  2/9/2017 - Present        Renal insufficiency ICD-10-CM: N28.9  ICD-9-CM: 593.9  Unknown - Present        Asthma ICD-10-CM: J45.909  ICD-9-CM: 493.90  Unknown - Present        Chronic deep vein thrombosis (DVT) ([facility] ICD-10-CM: I82.509  ICD-9-CM: 453.50  Unknown - Present        NEIL on CPAP ICD-10-CM: G47.33  ICD-9-CM: 327.23  Unknown - Present        Depression ICD-10-CM: F32.9  ICD-9-CM: 311  Unknown - Present        Major depression ICD-10-CM: F32.9  ICD-9-CM: 296.20  Unknown - Present        Hyperlipidemia ICD-10-CM: E78.5  ICD-9-CM: 272.4  Unknown - Present        Hypertension ICD-10-CM: I10  ICD-9-CM: 401.9  Unknown - Present        Anxiety ICD-10-CM: F41.9  ICD-9-CM: 300.00  Unknown - Present        GERD (gastroesophageal reflux disease) ICD-10-CM: K21.9  ICD-9-CM: 530.81  Unknown - Present        Mild diastolic dysfunction TAQ-65-SQ: I51.9  ICD-9-CM: 429.9  Unknown - Present        Major depressive disorder (Chronic) ICD-10-CM: F32.9  ICD-9-CM: 296.20  11/22/2016 - Present        Chronic obstructive pulmonary disease ([facility] (Chronic) ICD-10-CM: J44.9  ICD-9-CM: 585  11/22/2016 - Present    Overview Signed 2/9/2017  6:17 PM by SHERRIE Puga     Overview:   5/2013: FVC 40%, FEV1 38%,m ratio 74%, +BD response, +air trapping  Prev required chronic prednisone 10mg daily             Chronic obstructive pulmonary disease with acute exacerbation (HCC) ICD-10-CM: J44.1  ICD-9-CM: 491.21  11/22/2016 - Present        Deep vein thrombosis (DVT) of lower extremity (HCC) (Chronic) ICD-10-CM: I82.409  ICD-9-CM: 453.40  11/22/2016 - Present    Overview Signed 2/9/2017  6:17 PM by SHERRIE Urban     Overview:   LLE 12/2014  LLE 7/2013             Obstructive sleep apnea syndrome (Chronic) ICD-10-CM: V22.74  ICD-9-CM: 327.23  11/22/2016 - Present        Gastroesophageal reflux disease without esophagitis ICD-10-CM: K21.9  ICD-9-CM: 530.81  7/6/2016 - Present        History of deep venous thrombosis ICD-10-CM: Z86.718  ICD-9-CM: V12.51  7/6/2016 - Present        Acute respiratory failure ([facility] ICD-10-CM: J96.00  ICD-9-CM: 518.81  4/24/2016 - Present        Severe episode of recurrent major depressive disorder, without psychotic features ([facility] ICD-10-CM: F33.2  ICD-9-CM: 296.33  2/14/2016 - Present        Housing unsatisfactory ICD-10-CM: Z59.9  ICD-9-CM: V60.9  2/14/2016 - Present        Suicidal ideation ICD-10-CM: R45.851  ICD-9-CM: V62.84  2/12/2016 - Present        Cocaine abuse (Chronic) ICD-10-CM: F14.10  ICD-9-CM: 305.60  12/13/2015 - Present        Alcohol abuse (Chronic) ICD-10-CM: F10.10  ICD-9-CM: 305.00  12/13/2015 - Present        Dyslipidemia ICD-10-CM: E78.5  ICD-9-CM: 272.4  1/6/2015 - Present        RESOLVED: Major depression, recurrent ([facility] ICD-10-CM: F33.9  ICD-9-CM: 296.30  12/13/2015 - 11/22/2016        RESOLVED: Cocaine abuse with cocaine-induced mood disorder ([facility] ICD-10-CM: F14.14  ICD-9-CM: 292.84  12/13/2015 - 11/22/2016        RESOLVED: Bronchitis ICD-10-CM: J40  ICD-9-CM: 995  Unknown - 11/22/2016              Disposition: substance abuse facility  [facility]      Discharged Condition: stable    Past Medical History   Diagnosis Date    Alcohol abuse     Anxiety     Asthma     Bronchitis     Chronic deep vein thrombosis (DVT) ([facility] 07/2013 07/2013, 12/2014 (LLE)    Cocaine abuse     COPD     Depression     GERD (gastroesophageal reflux disease)     Hyperlipidemia     Hypertension     Major depression     Mild diastolic dysfunction     NEIL on CPAP     Renal insufficiency     Suicidal ideation     Vitamin D deficiency       No family history on file. Social History   Substance Use Topics    Smoking status: Former Smoker    Smokeless tobacco: Not on file      Comment: States that he quit 12 years ago.  Alcohol use Yes     No past surgical history on file. Prior to Admission medications    Medication Sig Start Date End Date Taking? Authorizing Provider   ARIPiprazole (ABILIFY) 5 mg tablet Take 1 Tab by mouth daily. Indications: DEPRESSION TREATMENT ADJUNCT 2/21/17  Yes Angelica Benavides MD   buPROPion XL (WELLBUTRIN XL) 300 mg XL tablet Take 1 Tab by mouth every morning for 30 days. Indications: ANXIETY WITH DEPRESSION 2/21/17 3/23/17 Yes Angelica Benavides MD   dabigatran etexilate (PRADAXA) 150 mg capsule Take 1 Cap by mouth every twelve (12) hours. Indications: PREVENT THROMBOEMBOLISM IN CHRONIC ATRIAL FIBRILLATION, PREVENTION OF DEEP VEIN THROMBOSIS RECURRENCE 2/21/17  Yes Angelica Benavides MD   sertraline (ZOLOFT) 50 mg tablet Take 1 Tab by mouth daily for 30 days. Indications: ANXIETY WITH DEPRESSION 2/21/17 3/23/17 Yes Angelica Benavides MD   albuterol (PROVENTIL HFA, VENTOLIN HFA, PROAIR HFA) 90 mcg/actuation inhaler Take 2 Puffs by inhalation every four (4) hours as needed for Wheezing or Shortness of Breath (Cough). 2/10/17  Yes SHERRIE Nagy   predniSONE (DELTASONE) 20 mg tablet Begin taking tomorrow Saturday, 2/11/17 as you received your initial dose while in the ER. 3 tabs po every day x 2 days, then 2 tabs po every day x 3 days, then 1 tab po every day x 3 days.  2/11/17   SHERRIE Nagy   inhalational spacing device ALWAYS USE WITH INHALER 2/10/17   SHERRIE Nagy   levoFLOXacin (LEVAQUIN) 750 mg tablet Take 1 Tab by mouth daily. 2/10/17   Memory Senate, NP   SPIRIVA WITH HANDIHALER 18 mcg inhalation capsule Take 1 Cap by inhalation daily. 2/1/17 Dr. Susy Heath 2/1/17   Keisha Obrien MD   omeprazole (PRILOSEC) 20 mg capsule Take 1 Cap by mouth two (2) times a day. 2/1/17, QTY#60, 30 Days. 2/1/17 Dr. Susy Heath 2/1/17   Keisha Obrien MD   STOOL SOFTENER 100 mg capsule Take 1 Cap by mouth daily. 2/1/17, QTY#100 Caps, 30 Days. Dr. Susy Heath 2/1/17   Keisha Obrien MD   hydrOXYzine pamoate (VISTARIL) 25 mg capsule Take 1 Cap by mouth two (2) times a day. 2/1/17, QTY#60 Caps, 30 Days. Dr. Susy Heath 2/1/17   Keisha Obrien MD   buPROPion SR [facility] SR) 150 mg SR tablet Take 1 Tab by mouth two (2) times a day. 11/2/16, QTY#60 Tabs, 30 Days. Dr. Susy Heath 11/2/16   Keisha Obrien MD   ABILIFY 20 mg tablet Take 0.5 Tabs by mouth daily. 2/1/17, QTY#15 Tabs, 30 Days. Dr. Susy Heath 2/1/17   Keisha Obrien MD   budesonide-formoterol (SYMBICORT) 160-4.5 mcg/actuation HFA inhaler Take 2 Puffs by inhalation two (2) times a day. Historical Provider   dabigatran etexilate (PRADAXA) 150 mg capsule Take 150 mg by mouth every twelve (12) hours. Historical Provider   albuterol-ipratropium (DUONEB) 2.5 mg-0.5 mg/3 ml nebu 3 mL by Nebulization route every six (6) hours as needed. Historical Provider     Allergies   Allergen Reactions    Shellfish Derived Anaphylaxis        Hospital Course: The patient was admitted to the Open Adult  unit on suicide  precautions. The patient was engaged in individual, group, art  therapies, and occupational therapy. The patient was involved in chemical dependence  educational classes and NA/AA. At the time of discharge, the patient denied homicidal or suicidal ideation. Patient  was not psychotic and was capable of self care and competent to make their own financial and medical decisions. The patient has an understanding of treatment recommendations and medication management on discharge.      Discharge Exams:  Mental Status exam: WNL except for    Sensorium  oriented to time, place and person   Orientation person, place and time/date   Relations cooperative   Eye Contact appropriate   Appearance:  age appropriate, bearded and casually dressed   Motor Behavior:  within normal limits   Speech:  normal pitch and normal volume   Vocabulary average   Thought Process: logical   Thought Content free of delusions and free of hallucinations   Suicidal ideations no plan , no intention and contracts for safety   Homicidal ideations none   Mood:  depressed   Affect:  depressed   Memory recent  adequate   Memory remote:  adequate   Concentration:  adequate   Abstraction:  concrete   Insight:  fair   Reliability fair   Judgment:  fair     Physical exam: No exam performed today, patient left without being seen. Chest X-Ray: normal  ECG: normal EKG, normal sinus rhythm, unchanged from previous tracings    Lab/Data Review: All lab results for the last 24 hours reviewed. Consultations (including impressions and outcomes): none     Psychiatric Testing none     Treatment and Response: pt did well with resumption of med's and we were able to reduce Abilify to 5 mg to relieve fatigue s worsening of internal voices      Significant adverse reaction to drugs: none    Procedures/Operations:     Current Discharge Medication List      START taking these medications    Details   bupropion XL (WELLBUTRIN XL) 300 mg XL tablet Take 1 Tab by mouth every morning for 30 days. Indications: ANXIETY WITH DEPRESSION  Qty: 30 Tab, Refills: 0      !! dabigatran etexilate (PRADAXA) 150 mg capsule Take 1 Cap by mouth every twelve (12) hours. Indications: PREVENT THROMBOEMBOLISM IN CHRONIC ATRIAL FIBRILLATION, PREVENTION OF DEEP VEIN THROMBOSIS RECURRENCE  Qty: 60 Cap, Refills: 1      sertraline (ZOLOFT) 50 mg tablet Take 1 Tab by mouth daily for 30 days.  Indications: ANXIETY WITH DEPRESSION  Qty: 30 Tab, Refills: 1       !! - Potential duplicate medications found. Please discuss with provider. CONTINUE these medications which have CHANGED    Details   ARIPiprazole (ABILIFY) 5 mg tablet Take 1 Tab by mouth daily. Indications: DEPRESSION TREATMENT ADJUNCT  Qty: 30 Tab, Refills: 1         CONTINUE these medications which have NOT CHANGED    Details   albuterol (PROVENTIL HFA, VENTOLIN HFA, PROAIR HFA) 90 mcg/actuation inhaler Take 2 Puffs by inhalation every four (4) hours as needed for Wheezing or Shortness of Breath (Cough). Qty: 1 Inhaler, Refills: 0      inhalational spacing device ALWAYS USE WITH INHALER  Qty: 1 Device, Refills: 0      SPIRIVA WITH HANDIHALER 18 mcg inhalation capsule Take 1 Cap by inhalation daily. 2/1/17 Dr. Torie Alejo      omeprazole (PRILOSEC) 20 mg capsule Take 1 Cap by mouth two (2) times a day. 2/1/17, QTY#60, 30 Days. 2/1/17 Dr. Coleman Estimable 100 mg capsule Take 1 Cap by mouth daily. 2/1/17, QTY#100 Caps, 30 Days. Dr. Torie Alejo      budesonide-formoterol [facility]) 160-4.5 mcg/actuation HFA inhaler Take 2 Puffs by inhalation two (2) times a day. !! dabigatran etexilate (PRADAXA) 150 mg capsule Take 150 mg by mouth every twelve (12) hours. albuterol-ipratropium (DUONEB) 2.5 mg-0.5 mg/3 ml nebu 3 mL by Nebulization route every six (6) hours as needed. !! - Potential duplicate medications found. Please discuss with provider.       STOP taking these medications       predniSONE (DELTASONE) 20 mg tablet Comments:   Reason for Stopping:         levoFLOXacin (LEVAQUIN) 750 mg tablet Comments:   Reason for Stopping:         levoFLOXacin (LEVAQUIN) 750 mg tablet Comments:   Reason for Stopping:         hydrOXYzine pamoate (VISTARIL) 25 mg capsule Comments:   Reason for Stopping:         bupropion SR (WELLBUTRIN SR) 150 mg SR tablet Comments:   Reason for Stopping:                 Activity: Activity as tolerated  Diet: Regular Diet      Follow-up with: Kristian William tomorrow      Copy of D/C summary to:Kristian William Signed:  Juan Francisco Aguirre MD  2/21/2017  11:46 AM

## 2017-02-21 NOTE — BH NOTES
PT has a calm demeanor this afternoon/evening. PT states he received the phone call that he was looking for, concerning long term treatment in [place]. PT looking forward to being discharged tomorrow-very excited when he speaks about it. PT participated in community therapy and was encouraged to provide feedback and suggestions-did not have input at the time. PT participated in activity therapy-chose  a motto that fit his lifestyle  and goals upon discharge from the unit. States that he's sleeping well at night. No S/I. PT Encouraged to wear non-skid socks.

## 2017-02-21 NOTE — BH NOTES
Patient was discharged off the unit with his prescriptions, belongings and discharge instructions and was picked up by a [employer] cab to take him to [facility] to go to [place] for a 30 day rehab program.

## 2018-06-25 PROBLEM — I71.40 AAA (ABDOMINAL AORTIC ANEURYSM): Status: ACTIVE | Noted: 2018-06-08

## 2021-08-03 PROBLEM — E78.5 HYPERLIPIDEMIA: Status: RESOLVED | Noted: 2021-08-03 | Resolved: 2021-08-03

## 2021-08-03 PROBLEM — F32.A DEPRESSION: Status: RESOLVED | Noted: 2021-08-03 | Resolved: 2021-08-03

## 2021-08-03 PROBLEM — K21.9 GERD (GASTROESOPHAGEAL REFLUX DISEASE): Status: RESOLVED | Noted: 2021-08-03 | Resolved: 2021-08-03

## 2021-08-03 PROBLEM — I10 ESSENTIAL HYPERTENSION: Status: RESOLVED | Noted: 2017-02-09 | Resolved: 2021-08-03

## 2021-11-02 ENCOUNTER — HOSPITAL ENCOUNTER (EMERGENCY)
Age: 71
Discharge: HOME | End: 2021-11-02
Attending: EMERGENCY MEDICINE

## 2021-11-02 ENCOUNTER — APPOINTMENT (OUTPATIENT)
Dept: GENERAL RADIOLOGY | Age: 71
End: 2021-11-02
Attending: EMERGENCY MEDICINE

## 2021-11-02 VITALS
WEIGHT: 205 LBS | HEART RATE: 88 BPM | HEIGHT: 72 IN | SYSTOLIC BLOOD PRESSURE: 134 MMHG | TEMPERATURE: 98.1 F | RESPIRATION RATE: 24 BRPM | OXYGEN SATURATION: 98 % | BODY MASS INDEX: 27.77 KG/M2 | DIASTOLIC BLOOD PRESSURE: 78 MMHG

## 2021-11-02 DIAGNOSIS — F14.10 COCAINE ABUSE (HCC): ICD-10-CM

## 2021-11-02 DIAGNOSIS — I10 HYPERTENSION, UNSPECIFIED TYPE: ICD-10-CM

## 2021-11-02 DIAGNOSIS — J44.1 COPD EXACERBATION (HCC): Primary | ICD-10-CM

## 2021-11-02 LAB
ALBUMIN SERPL-MCNC: 2.6 G/DL (ref 3.4–5)
ALBUMIN/GLOB SERPL: 0.7 {RATIO} (ref 0.8–1.7)
ALP SERPL-CCNC: 64 U/L (ref 45–117)
ALT SERPL-CCNC: 31 U/L (ref 16–61)
AMPHET UR QL SCN: NEGATIVE
ANION GAP SERPL CALC-SCNC: 0 MMOL/L (ref 3–18)
APPEARANCE UR: CLEAR
AST SERPL-CCNC: 22 U/L (ref 10–38)
ATRIAL RATE: 95 BPM
BARBITURATES UR QL SCN: NEGATIVE
BASOPHILS # BLD: 0 K/UL (ref 0–0.1)
BASOPHILS NFR BLD: 0 % (ref 0–2)
BENZODIAZ UR QL: NEGATIVE
BILIRUB SERPL-MCNC: 0.3 MG/DL (ref 0.2–1)
BILIRUB UR QL: NEGATIVE
BNP SERPL-MCNC: 39 PG/ML (ref 0–900)
BUN SERPL-MCNC: 10 MG/DL (ref 7–18)
BUN/CREAT SERPL: 9 (ref 12–20)
CALCIUM SERPL-MCNC: 8.3 MG/DL (ref 8.5–10.1)
CALCULATED P AXIS, ECG09: 70 DEGREES
CALCULATED R AXIS, ECG10: 2 DEGREES
CALCULATED T AXIS, ECG11: 73 DEGREES
CANNABINOIDS UR QL SCN: NEGATIVE
CHLORIDE SERPL-SCNC: 110 MMOL/L (ref 100–111)
CK MB CFR SERPL CALC: NORMAL % (ref 0–4)
CK MB SERPL-MCNC: <1 NG/ML (ref 5–25)
CK SERPL-CCNC: 109 U/L (ref 39–308)
CO2 SERPL-SCNC: 30 MMOL/L (ref 21–32)
COCAINE UR QL SCN: POSITIVE
COLOR UR: YELLOW
CREAT SERPL-MCNC: 1.07 MG/DL (ref 0.6–1.3)
DIAGNOSIS, 93000: NORMAL
DIFFERENTIAL METHOD BLD: ABNORMAL
EOSINOPHIL # BLD: 0.3 K/UL (ref 0–0.4)
EOSINOPHIL NFR BLD: 5 % (ref 0–5)
ERYTHROCYTE [DISTWIDTH] IN BLOOD BY AUTOMATED COUNT: 15.8 % (ref 11.6–14.5)
ETHANOL SERPL-MCNC: <3 MG/DL (ref 0–3)
GLOBULIN SER CALC-MCNC: 3.9 G/DL (ref 2–4)
GLUCOSE SERPL-MCNC: 125 MG/DL (ref 74–99)
GLUCOSE UR STRIP.AUTO-MCNC: NEGATIVE MG/DL
HCT VFR BLD AUTO: 42.9 % (ref 36–48)
HDSCOM,HDSCOM: ABNORMAL
HGB BLD-MCNC: 13.1 G/DL (ref 13–16)
HGB UR QL STRIP: NEGATIVE
KETONES UR QL STRIP.AUTO: NEGATIVE MG/DL
LEUKOCYTE ESTERASE UR QL STRIP.AUTO: NEGATIVE
LYMPHOCYTES # BLD: 2.2 K/UL (ref 0.9–3.6)
LYMPHOCYTES NFR BLD: 30 % (ref 21–52)
MCH RBC QN AUTO: 29 PG (ref 24–34)
MCHC RBC AUTO-ENTMCNC: 30.5 G/DL (ref 31–37)
MCV RBC AUTO: 94.9 FL (ref 78–100)
METHADONE UR QL: NEGATIVE
MONOCYTES # BLD: 0.6 K/UL (ref 0.05–1.2)
MONOCYTES NFR BLD: 8 % (ref 3–10)
NEUTS SEG # BLD: 4.2 K/UL (ref 1.8–8)
NEUTS SEG NFR BLD: 56 % (ref 40–73)
NITRITE UR QL STRIP.AUTO: NEGATIVE
OPIATES UR QL: NEGATIVE
P-R INTERVAL, ECG05: 142 MS
PCP UR QL: NEGATIVE
PH UR STRIP: 5 [PH] (ref 5–8)
PLATELET # BLD AUTO: 146 K/UL (ref 135–420)
PMV BLD AUTO: 9.6 FL (ref 9.2–11.8)
POTASSIUM SERPL-SCNC: 4.2 MMOL/L (ref 3.5–5.5)
PROT SERPL-MCNC: 6.5 G/DL (ref 6.4–8.2)
PROT UR STRIP-MCNC: NEGATIVE MG/DL
Q-T INTERVAL, ECG07: 368 MS
QRS DURATION, ECG06: 86 MS
QTC CALCULATION (BEZET), ECG08: 462 MS
RBC # BLD AUTO: 4.52 M/UL (ref 4.35–5.65)
SODIUM SERPL-SCNC: 140 MMOL/L (ref 136–145)
SP GR UR REFRACTOMETRY: 1.02 (ref 1–1.03)
TROPONIN I SERPL-MCNC: <0.02 NG/ML (ref 0–0.04)
UROBILINOGEN UR QL STRIP.AUTO: 1 EU/DL (ref 0.2–1)
VENTRICULAR RATE, ECG03: 95 BPM
WBC # BLD AUTO: 7.4 K/UL (ref 4.6–13.2)

## 2021-11-02 PROCEDURE — 80307 DRUG TEST PRSMV CHEM ANLYZR: CPT

## 2021-11-02 PROCEDURE — 74011000250 HC RX REV CODE- 250: Performed by: EMERGENCY MEDICINE

## 2021-11-02 PROCEDURE — 93005 ELECTROCARDIOGRAM TRACING: CPT

## 2021-11-02 PROCEDURE — 83880 ASSAY OF NATRIURETIC PEPTIDE: CPT

## 2021-11-02 PROCEDURE — 96374 THER/PROPH/DIAG INJ IV PUSH: CPT

## 2021-11-02 PROCEDURE — 74011250636 HC RX REV CODE- 250/636: Performed by: EMERGENCY MEDICINE

## 2021-11-02 PROCEDURE — 81003 URINALYSIS AUTO W/O SCOPE: CPT

## 2021-11-02 PROCEDURE — 94640 AIRWAY INHALATION TREATMENT: CPT

## 2021-11-02 PROCEDURE — 71045 X-RAY EXAM CHEST 1 VIEW: CPT

## 2021-11-02 PROCEDURE — 85025 COMPLETE CBC W/AUTO DIFF WBC: CPT

## 2021-11-02 PROCEDURE — 82553 CREATINE MB FRACTION: CPT

## 2021-11-02 PROCEDURE — 80053 COMPREHEN METABOLIC PANEL: CPT

## 2021-11-02 PROCEDURE — 82077 ASSAY SPEC XCP UR&BREATH IA: CPT

## 2021-11-02 PROCEDURE — 99284 EMERGENCY DEPT VISIT MOD MDM: CPT

## 2021-11-02 RX ORDER — IPRATROPIUM BROMIDE AND ALBUTEROL SULFATE 2.5; .5 MG/3ML; MG/3ML
3 SOLUTION RESPIRATORY (INHALATION)
Status: COMPLETED | OUTPATIENT
Start: 2021-11-02 | End: 2021-11-02

## 2021-11-02 RX ORDER — BENZONATATE 100 MG/1
100 CAPSULE ORAL
Qty: 30 CAPSULE | Refills: 0 | Status: SHIPPED | OUTPATIENT
Start: 2021-11-02 | End: 2021-11-09

## 2021-11-02 RX ORDER — SODIUM CHLORIDE 0.9 % (FLUSH) 0.9 %
5-40 SYRINGE (ML) INJECTION AS NEEDED
Status: DISCONTINUED | OUTPATIENT
Start: 2021-11-02 | End: 2021-11-02 | Stop reason: HOSPADM

## 2021-11-02 RX ORDER — AZITHROMYCIN 500 MG/1
500 TABLET, FILM COATED ORAL DAILY
Qty: 3 TABLET | Refills: 0 | Status: SHIPPED | OUTPATIENT
Start: 2021-11-02 | End: 2021-11-26

## 2021-11-02 RX ORDER — IPRATROPIUM BROMIDE AND ALBUTEROL SULFATE 2.5; .5 MG/3ML; MG/3ML
3 SOLUTION RESPIRATORY (INHALATION)
Status: DISCONTINUED | OUTPATIENT
Start: 2021-11-02 | End: 2021-11-02 | Stop reason: HOSPADM

## 2021-11-02 RX ORDER — PREDNISONE 20 MG/1
60 TABLET ORAL DAILY
Qty: 15 TABLET | Refills: 0 | Status: SHIPPED | OUTPATIENT
Start: 2021-11-02 | End: 2021-11-07

## 2021-11-02 RX ADMIN — METHYLPREDNISOLONE SODIUM SUCCINATE 125 MG: 125 INJECTION, POWDER, FOR SOLUTION INTRAMUSCULAR; INTRAVENOUS at 04:21

## 2021-11-02 RX ADMIN — IPRATROPIUM BROMIDE AND ALBUTEROL SULFATE 3 ML: .5; 3 SOLUTION RESPIRATORY (INHALATION) at 04:20

## 2021-11-02 NOTE — ED PROVIDER NOTES
EMERGENCY DEPARTMENT HISTORY AND PHYSICAL EXAM    Date: 11/2/2021  Patient Name: Randy Hagan    History of Presenting Illness     Chief Complaint   Patient presents with    Shortness of Breath         History Provided By: Patient    Additional History (Context):   6:38 AM  Randy Hagan is a 70 y.o. male with PMHX of recurrent bronchitis, COPD, recurrent cocaine abuse, hypertension, hyperlipidemia, anxiety, alcohol abuse who presents to the emergency department C/O cough and COPD. Patient was brought in by ambulance for respiratory difficulties. He was given and a treatment in route. He states he been short of breath for over the last week. He denies any fevers or chills although has been having a dry nonproductive cough. He denies any abdominal pain nausea vomiting denies actual chest pain. He had a scent of alcohol on but he denies use of any alcohol or street drugs. Social History  He denies the current use of any smoking drinking or drugs however his medical record shows extensive use of alcohol and cocaine. He also denies any cigarette use    Family History  Positive for high blood pressure but denied any other medical problems in the family      PCP: Keisha Obrien MD    Current Facility-Administered Medications   Medication Dose Route Frequency Provider Last Rate Last Admin    sodium chloride (NS) flush 5-40 mL  5-40 mL IntraVENous PRN Matthew Bach MD        albuterol-ipratropium (DUO-NEB) 2.5 MG-0.5 MG/3 ML  3 mL Nebulization NOW Matthew Bach MD         Current Outpatient Medications   Medication Sig Dispense Refill    predniSONE (DELTASONE) 20 mg tablet Take 60 mg by mouth daily for 5 days. With Breakfast 15 Tablet 0    azithromycin (Zithromax TRI-JOSE) 500 mg tab Take 1 Tablet by mouth daily. 3 Tablet 0    benzonatate (Tessalon Perles) 100 mg capsule Take 1 Capsule by mouth three (3) times daily as needed for Cough for up to 7 days.  30 Capsule 0    DULoxetine (CYMBALTA) 20 mg capsule 20 mg.      therapeutic multivitamin-minerals (THERAGRAN-M) tablet Take 1 Tab by Mouth Once a Day.  traMADol (ULTRAM) 50 mg tablet 50 mg.      ARIPiprazole (ABILIFY) 5 mg tablet Take 1 Tab by mouth daily. Indications: DEPRESSION TREATMENT ADJUNCT 30 Tab 1    dabigatran etexilate (PRADAXA) 150 mg capsule Take 1 Cap by mouth every twelve (12) hours. Indications: PREVENT THROMBOEMBOLISM IN CHRONIC ATRIAL FIBRILLATION, PREVENTION OF DEEP VEIN THROMBOSIS RECURRENCE 60 Cap 1    albuterol (PROVENTIL HFA, VENTOLIN HFA, PROAIR HFA) 90 mcg/actuation inhaler Take 2 Puffs by inhalation every four (4) hours as needed for Wheezing or Shortness of Breath (Cough). 1 Inhaler 0    inhalational spacing device ALWAYS USE WITH INHALER 1 Device 0    SPIRIVA WITH HANDIHALER 18 mcg inhalation capsule Take 1 Cap by inhalation daily. 2/1/17 Dr. Meryl Chan omeprazole (PRILOSEC) 20 mg capsule Take 1 Cap by mouth two (2) times a day. 2/1/17, QTY#60, 30 Days. 2/1/17 Dr. Meryl Chan STOOL SOFTENER 100 mg capsule Take 1 Cap by mouth daily. 2/1/17, QTY#100 Caps, 30 Days. Dr. Meryl Chan budesonide-formoterol [facility]) 160-4.5 mcg/actuation HFA inhaler Take 2 Puffs by inhalation two (2) times a day.  dabigatran etexilate (PRADAXA) 150 mg capsule Take 150 mg by mouth every twelve (12) hours.  albuterol-ipratropium (DUONEB) 2.5 mg-0.5 mg/3 ml nebu 3 mL by Nebulization route every six (6) hours as needed.          Past History     Past Medical History:  Past Medical History:   Diagnosis Date    Alcohol abuse     Anxiety     Asthma     Bronchitis     Chronic deep vein thrombosis (DVT) ([facility] [address] 07/2013 07/2013, 12/2014 ([facility])    Cocaine abuse     COPD     Depression     GERD (gastroesophageal reflux disease)     Hyperlipidemia     Hypertension     Major depression     Mild diastolic dysfunction     NEIL on CPAP     Renal insufficiency     Suicidal ideation     Vitamin D deficiency        Past Surgical History:  Past Surgical History:   Procedure Laterality Date    HX HIP REPLACEMENT  08/2010    HX OTHER SURGICAL  06/2018    ENDOVASCULAR ANEURYSM REPAIR       Family History:  No family history on file. Social History:  Social History     Tobacco Use    Smoking status: Former Smoker    Smokeless tobacco: Never Used    Tobacco comment: States that he quit 12 years ago. Substance Use Topics    Alcohol use: Yes    Drug use: Yes     Types: Cocaine       Allergies: Allergies   Allergen Reactions    Shellfish Derived Anaphylaxis         Review of Systems   Review of Systems   Constitutional: Positive for fatigue. Respiratory: Positive for cough and shortness of breath. All other systems reviewed and are negative. Physical Exam     Vitals:    11/02/21 0408 11/02/21 0609 11/02/21 0622   BP: 134/78     Pulse: 78     Resp: 24     Temp: 98.1 °F (36.7 °C)     SpO2: 98% 96% 96%   Weight: 93 kg (205 lb)     Height: 6' (1.829 m)       Physical Exam  Vitals and nursing note reviewed. Constitutional:       General: He is not in acute distress. Appearance: He is well-developed. He is not diaphoretic. HENT:      Head: Normocephalic and atraumatic. Eyes:      General: No scleral icterus. Extraocular Movements:      Right eye: Normal extraocular motion. Left eye: Normal extraocular motion. Conjunctiva/sclera: Conjunctivae normal.      Pupils: Pupils are equal, round, and reactive to light. Neck:      Trachea: No tracheal deviation. Cardiovascular:      Rate and Rhythm: Normal rate and regular rhythm. Heart sounds: Normal heart sounds. Pulmonary:      Effort: Pulmonary effort is normal. No respiratory distress. Breath sounds: No stridor. Wheezing present. Abdominal:      General: Bowel sounds are normal. There is no distension. Palpations: Abdomen is soft. Tenderness: There is no abdominal tenderness. There is no rebound.    Musculoskeletal: General: No tenderness. Normal range of motion. Cervical back: Normal range of motion and neck supple. Comments: Grossly unremarkable without abnormalities   Skin:     General: Skin is warm and dry. Capillary Refill: Capillary refill takes less than 2 seconds. Findings: No erythema or rash. Neurological:      Mental Status: He is alert and oriented to person, place, and time. Cranial Nerves: No cranial nerve deficit. Motor: No weakness. Psychiatric:         Mood and Affect: Mood normal.         Behavior: Behavior normal.         Thought Content: Thought content normal.         Judgment: Judgment normal.       Diagnostic Study Results     Labs -  Recent Results (from the past 24 hour(s))   CBC WITH AUTOMATED DIFF    Collection Time: 11/02/21  4:00 AM   Result Value Ref Range    WBC 7.4 4.6 - 13.2 K/uL    RBC 4.52 4.35 - 5.65 M/uL    HGB 13.1 13.0 - 16.0 g/dL    HCT 42.9 36.0 - 48.0 %    MCV 94.9 78.0 - 100.0 FL    MCH 29.0 24.0 - 34.0 PG    MCHC 30.5 (L) 31.0 - 37.0 g/dL    RDW 15.8 (H) 11.6 - 14.5 %    PLATELET 059 191 - 835 K/uL    MPV 9.6 9.2 - 11.8 FL    NEUTROPHILS 56 40 - 73 %    LYMPHOCYTES 30 21 - 52 %    MONOCYTES 8 3 - 10 %    EOSINOPHILS 5 0 - 5 %    BASOPHILS 0 0 - 2 %    ABS. NEUTROPHILS 4.2 1.8 - 8.0 K/UL    ABS. LYMPHOCYTES 2.2 0.9 - 3.6 K/UL    ABS. MONOCYTES 0.6 0.05 - 1.2 K/UL    ABS. EOSINOPHILS 0.3 0.0 - 0.4 K/UL    ABS.  BASOPHILS 0.0 0.0 - 0.1 K/UL    DF AUTOMATED     CARDIAC PANEL,(CK, CKMB & TROPONIN)    Collection Time: 11/02/21  4:00 AM   Result Value Ref Range    CK - MB <1.0 <3.6 ng/ml    CK-MB Index  0.0 - 4.0 %     CALCULATION NOT PERFORMED WHEN RESULT IS BELOW LINEAR LIMIT     39 - 308 U/L    Troponin-I, QT <0.02 0.0 - 0.045 NG/ML   ETHYL ALCOHOL    Collection Time: 11/02/21  4:00 AM   Result Value Ref Range    ALCOHOL(ETHYL),SERUM <3 0 - 3 MG/DL   METABOLIC PANEL, COMPREHENSIVE    Collection Time: 11/02/21  4:00 AM   Result Value Ref Range Sodium 140 136 - 145 mmol/L    Potassium 4.2 3.5 - 5.5 mmol/L    Chloride 110 100 - 111 mmol/L    CO2 30 21 - 32 mmol/L    Anion gap 0 (L) 3.0 - 18 mmol/L    Glucose 125 (H) 74 - 99 mg/dL    BUN 10 7.0 - 18 MG/DL    Creatinine 1.07 0.6 - 1.3 MG/DL    BUN/Creatinine ratio 9 (L) 12 - 20      GFR est AA >60 >60 ml/min/1.73m2    GFR est non-AA >60 >60 ml/min/1.73m2    Calcium 8.3 (L) 8.5 - 10.1 MG/DL    Bilirubin, total 0.3 0.2 - 1.0 MG/DL    ALT (SGPT) 31 16 - 61 U/L    AST (SGOT) 22 10 - 38 U/L    Alk.  phosphatase 64 45 - 117 U/L    Protein, total 6.5 6.4 - 8.2 g/dL    Albumin 2.6 (L) 3.4 - 5.0 g/dL    Globulin 3.9 2.0 - 4.0 g/dL    A-G Ratio 0.7 (L) 0.8 - 1.7     NT-PRO BNP    Collection Time: 11/02/21  4:00 AM   Result Value Ref Range    NT pro-BNP 39 0 - 900 PG/ML   EKG, 12 LEAD, INITIAL    Collection Time: 11/02/21  4:06 AM   Result Value Ref Range    Ventricular Rate 95 BPM    Atrial Rate 95 BPM    P-R Interval 142 ms    QRS Duration 86 ms    Q-T Interval 368 ms    QTC Calculation (Bezet) 462 ms    Calculated P Axis 70 degrees    Calculated R Axis 2 degrees    Calculated T Axis 73 degrees    Diagnosis       Sinus rhythm with premature supraventricular complexes and fusion complexes  Otherwise normal ECG  When compared with ECG of 22-NOV-2016 07:47,  fusion complexes are now present  premature ventricular complexes are no longer present  premature supraventricular complexes are now present     DRUG SCREEN, URINE    Collection Time: 11/02/21  4:42 AM   Result Value Ref Range    BENZODIAZEPINES Negative NEG      BARBITURATES Negative NEG      THC (TH-CANNABINOL) Negative NEG      OPIATES Negative NEG      PCP(PHENCYCLIDINE) Negative NEG      COCAINE Positive (A) NEG      AMPHETAMINES Negative NEG      METHADONE Negative NEG      HDSCOM (NOTE)    URINALYSIS W/ RFLX MICROSCOPIC    Collection Time: 11/02/21  4:43 AM   Result Value Ref Range    Color YELLOW      Appearance CLEAR      Specific gravity 1.022 1.005 - 1.030 pH (UA) 5.0 5.0 - 8.0      Protein Negative NEG mg/dL    Glucose Negative NEG mg/dL    Ketone Negative NEG mg/dL    Bilirubin Negative NEG      Blood Negative NEG      Urobilinogen 1.0 0.2 - 1.0 EU/dL    Nitrites Negative NEG      Leukocyte Esterase Negative NEG          Radiologic Studies -   XR CHEST PORT   Final Result      1. Peripheral right upper lobe patchy parenchymal opacity, superimposed upon   chronic underlying interstitial changes and hyperinflation. Diagnostic   considerations would include pneumonia and/or atelectasis/scarring. CT Results  (Last 48 hours)    None        CXR Results  (Last 48 hours)               11/02/21 0455  XR CHEST PORT Final result    Impression:      1. Peripheral right upper lobe patchy parenchymal opacity, superimposed upon   chronic underlying interstitial changes and hyperinflation. Diagnostic   considerations would include pneumonia and/or atelectasis/scarring. Narrative:  EXAM: XR CHEST PORT       CLINICAL INDICATION/HISTORY: sob   -Additional: None       COMPARISON: 02/09/2017, 11/22/2016       TECHNIQUE: Frontal view of the chest       _______________       FINDINGS:       HEART AND MEDIASTINUM: Stable midline cardiac flow without appreciable   cardiomegaly. Stable tortuous thoracic aorta. LUNGS AND PLEURAL SPACES: Chronic hyperinflation and interstitial scarring with   patchy peripheral and right minor fissural parenchymal opacities. No focal left   lung opacity. No pneumothorax or effusion. BONY THORAX AND SOFT TISSUES: No acute or destructive osseous abnormality.        _______________                 Medications given in the ED-  Medications   sodium chloride (NS) flush 5-40 mL (has no administration in time range)   albuterol-ipratropium (DUO-NEB) 2.5 MG-0.5 MG/3 ML (has no administration in time range)   methylPREDNISolone (PF) (Solu-MEDROL) injection 125 mg (125 mg IntraVENous Given 11/2/21 6430)   albuterol-ipratropium (DUO-NEB) 2.5 MG-0.5 MG/3 ML (3 mL Nebulization Given 11/2/21 6160)         Medical Decision Making   I am the first provider for this patient. I reviewed the vital signs, available nursing notes, past medical history, past surgical history, family history and social history. Vital Signs-Reviewed the patient's vital signs. Pulse Oximetry Analysis -98% on room air    Cardiac Monitor:  Rate: 76 bpm  Rhythm: Room air    EKG interpretation: (Preliminary)  4:06 AM    Normal sinus rhythm with PACs and a few fusion complexes. ST segments are unremarkable. QTC is 462  EKG read by Bobbi Dailey MD      Records Reviewed: NURSING NOTES AND PREVIOUS MEDICAL RECORDS    Provider Notes (Medical Decision Making):   Patient with COPD exacerbation which tends to be exacerbated by tobacco alcohol or street drugs. EKG does not show any evidence of visible ischemic event though ACS are certainly possible as well as pulmonary embolism but this is even less likely in my opinion. He responded well to medications and treatments low-dose chest x-ray looks borderline and unchanged from long-term radiology report did show ambiguous report about possibility of opacities. He was given some antibiotics to be followed as outpatient management. Procedures:  Procedures    ED Course:   5:38 AM: Initial assessment performed. The patients presenting problems have been discussed, and they are in agreement with the care plan formulated and outlined with them. I have encouraged them to ask questions as they arise throughout their visit. Diagnosis and Disposition     CRITICAL CARE NOTE:  7:47 AM  I have spent 45 minutes of critical care time involved in lab review, consultations with specialist, family decision-making, and documentation. During this entire length of time I was immediately available to the patient. Critical Care:   The reason for providing this level of medical care for this critically ill patient was due a critical illness that impaired one or more vital organ systems such that there was a high probability of imminent or life threatening deterioration in the patients condition. This care involved high complexity decision making to assess, manipulate, and support vital system functions, to treat this degreee vital organ system failure and to prevent further life threatening deterioration of the patients condition. DISCHARGE NOTE:  7:47 AM  Emily Magaña's  results have been reviewed with him. He has been counseled regarding his diagnosis, treatment, and plan. He verbally conveys understanding and agreement of the signs, symptoms, diagnosis, treatment and prognosis and additionally agrees to follow up as discussed. He also agrees with the care-plan and conveys that all of his questions have been answered. I have also provided discharge instructions for him that include: educational information regarding their diagnosis and treatment, and list of reasons why they would want to return to the ED prior to their follow-up appointment, should his condition change. He has been provided with education for proper emergency department utilization. CLINICAL IMPRESSION:    1. COPD exacerbation ([facility] [address]    2[facility]    3. Hypertension, unspecified type        PLAN:  1. D/C Home  2. Current Discharge Medication List      START taking these medications    Details   predniSONE (DELTASONE) 20 mg tablet Take 60 mg by mouth daily for 5 days. With Breakfast  Qty: 15 Tablet, Refills: 0  Start date: 11/2/2021, End date: 11/7/2021      azithromycin (Zithromax TRI-JOSE) 500 mg tab Take 1 Tablet by mouth daily. Qty: 3 Tablet, Refills: 0  Start date: 11/2/2021      benzonatate (Tessalon Perles) 100 mg capsule Take 1 Capsule by mouth three (3) times daily as needed for Cough for up to 7 days. Qty: 30 Capsule, Refills: 0  Start date: 11/2/2021, End date: 11/9/2021           3.    Follow-up Information     Follow up With Specialties Details Why [address]  In 4 days  [address] [address]  [phone]        _______________________________    This note was partially transcribed via voice recognition software. Although efforts have been made to catch any discrepancies, it may contain sound alike words, grammatical errors, or nonsensical words.

## 2021-11-23 ENCOUNTER — HOSPITAL ENCOUNTER (INPATIENT)
Age: 71
LOS: 3 days | Discharge: HOME | DRG: 193 | End: 2021-11-26
Attending: STUDENT IN AN ORGANIZED HEALTH CARE EDUCATION/TRAINING PROGRAM | Admitting: HOSPITALIST
Payer: MEDICARE

## 2021-11-23 ENCOUNTER — APPOINTMENT (OUTPATIENT)
Dept: GENERAL RADIOLOGY | Age: 71
DRG: 193 | End: 2021-11-23
Attending: STUDENT IN AN ORGANIZED HEALTH CARE EDUCATION/TRAINING PROGRAM
Payer: MEDICARE

## 2021-11-23 DIAGNOSIS — J96.01 ACUTE HYPOXEMIC RESPIRATORY FAILURE (HCC): Primary | ICD-10-CM

## 2021-11-23 DIAGNOSIS — J44.1 COPD EXACERBATION (HCC): ICD-10-CM

## 2021-11-23 DIAGNOSIS — G47.33 OBSTRUCTIVE SLEEP APNEA SYNDROME: Chronic | ICD-10-CM

## 2021-11-23 DIAGNOSIS — F14.10 COCAINE ABUSE (HCC): Chronic | ICD-10-CM

## 2021-11-23 PROBLEM — J18.9 PNEUMONIA: Status: ACTIVE | Noted: 2021-11-23

## 2021-11-23 PROBLEM — J96.90 RESPIRATORY FAILURE (HCC): Status: ACTIVE | Noted: 2021-11-23

## 2021-11-23 LAB
AMPHET UR QL SCN: NEGATIVE
ANION GAP SERPL CALC-SCNC: 5 MMOL/L (ref 3–18)
ARTERIAL PATENCY WRIST A: POSITIVE
ATRIAL RATE: 88 BPM
BARBITURATES UR QL SCN: NEGATIVE
BASE DEFICIT BLD-SCNC: 1.4 MMOL/L
BASOPHILS # BLD: 0 K/UL (ref 0–0.1)
BASOPHILS NFR BLD: 0 % (ref 0–2)
BDY SITE: ABNORMAL
BENZODIAZ UR QL: NEGATIVE
BODY TEMPERATURE: 98.8
BUN SERPL-MCNC: 9 MG/DL (ref 7–18)
BUN/CREAT SERPL: 8 (ref 12–20)
CALCIUM SERPL-MCNC: 8.6 MG/DL (ref 8.5–10.1)
CALCULATED P AXIS, ECG09: 84 DEGREES
CALCULATED R AXIS, ECG10: 43 DEGREES
CALCULATED T AXIS, ECG11: 76 DEGREES
CANNABINOIDS UR QL SCN: NEGATIVE
CHLORIDE SERPL-SCNC: 109 MMOL/L (ref 100–111)
CK MB CFR SERPL CALC: NORMAL % (ref 0–4)
CK MB SERPL-MCNC: <1 NG/ML (ref 5–25)
CK SERPL-CCNC: 84 U/L (ref 39–308)
CO2 SERPL-SCNC: 27 MMOL/L (ref 21–32)
COCAINE UR QL SCN: POSITIVE
COVID-19 RAPID TEST, COVR: NOT DETECTED
CREAT SERPL-MCNC: 1.14 MG/DL (ref 0.6–1.3)
DIAGNOSIS, 93000: NORMAL
DIFFERENTIAL METHOD BLD: ABNORMAL
EOSINOPHIL # BLD: 1.1 K/UL (ref 0–0.4)
EOSINOPHIL NFR BLD: 15 % (ref 0–5)
ERYTHROCYTE [DISTWIDTH] IN BLOOD BY AUTOMATED COUNT: 15.2 % (ref 11.6–14.5)
GAS FLOW.O2 O2 DELIVERY SYS: ABNORMAL L/MIN
GAS FLOW.O2 SETTING OXYMISER: 28 BPM
GLUCOSE BLD STRIP.AUTO-MCNC: 215 MG/DL (ref 70–110)
GLUCOSE BLD STRIP.AUTO-MCNC: 223 MG/DL (ref 70–110)
GLUCOSE SERPL-MCNC: 109 MG/DL (ref 74–99)
HCO3 BLD-SCNC: 24.2 MMOL/L (ref 22–26)
HCT VFR BLD AUTO: 43 % (ref 36–48)
HDSCOM,HDSCOM: ABNORMAL
HGB BLD-MCNC: 13.5 G/DL (ref 13–16)
IMM GRANULOCYTES # BLD AUTO: 0 K/UL (ref 0–0.04)
IMM GRANULOCYTES NFR BLD AUTO: 0 % (ref 0–0.5)
LACTATE BLD-SCNC: 1.18 MMOL/L (ref 0.4–2)
LYMPHOCYTES # BLD: 2.7 K/UL (ref 0.9–3.6)
LYMPHOCYTES NFR BLD: 37 % (ref 21–52)
MCH RBC QN AUTO: 29.5 PG (ref 24–34)
MCHC RBC AUTO-ENTMCNC: 31.4 G/DL (ref 31–37)
MCV RBC AUTO: 93.9 FL (ref 78–100)
METHADONE UR QL: NEGATIVE
MONOCYTES # BLD: 0.7 K/UL (ref 0.05–1.2)
MONOCYTES NFR BLD: 9 % (ref 3–10)
NEUTS SEG # BLD: 2.9 K/UL (ref 1.8–8)
NEUTS SEG NFR BLD: 39 % (ref 40–73)
NRBC # BLD: 0 K/UL (ref 0–0.01)
NRBC BLD-RTO: 0 PER 100 WBC
OPIATES UR QL: NEGATIVE
P-R INTERVAL, ECG05: 136 MS
PCO2 BLD: 43.4 MMHG (ref 35–45)
PCP UR QL: NEGATIVE
PH BLD: 7.36 [PH] (ref 7.35–7.45)
PLATELET # BLD AUTO: 192 K/UL (ref 135–420)
PMV BLD AUTO: 9.5 FL (ref 9.2–11.8)
PO2 BLD: 58 MMHG (ref 80–100)
POTASSIUM SERPL-SCNC: 3.8 MMOL/L (ref 3.5–5.5)
Q-T INTERVAL, ECG07: 360 MS
QRS DURATION, ECG06: 82 MS
QTC CALCULATION (BEZET), ECG08: 435 MS
RBC # BLD AUTO: 4.58 M/UL (ref 4.35–5.65)
RBC MORPH BLD: ABNORMAL
SAO2 % BLD: 88 % (ref 92–97)
SERVICE CMNT-IMP: ABNORMAL
SODIUM SERPL-SCNC: 141 MMOL/L (ref 136–145)
SOURCE, COVRS: NORMAL
SPECIMEN TYPE: ABNORMAL
TROPONIN I SERPL-MCNC: 0.02 NG/ML (ref 0–0.04)
VENTRICULAR RATE, ECG03: 88 BPM
WBC # BLD AUTO: 7.4 K/UL (ref 4.6–13.2)

## 2021-11-23 PROCEDURE — 80048 BASIC METABOLIC PNL TOTAL CA: CPT

## 2021-11-23 PROCEDURE — 82550 ASSAY OF CK (CPK): CPT

## 2021-11-23 PROCEDURE — 71045 X-RAY EXAM CHEST 1 VIEW: CPT

## 2021-11-23 PROCEDURE — 74011000258 HC RX REV CODE- 258: Performed by: STUDENT IN AN ORGANIZED HEALTH CARE EDUCATION/TRAINING PROGRAM

## 2021-11-23 PROCEDURE — 96368 THER/DIAG CONCURRENT INF: CPT

## 2021-11-23 PROCEDURE — 96365 THER/PROPH/DIAG IV INF INIT: CPT

## 2021-11-23 PROCEDURE — 65660000000 HC RM CCU STEPDOWN

## 2021-11-23 PROCEDURE — 94640 AIRWAY INHALATION TREATMENT: CPT

## 2021-11-23 PROCEDURE — 74011250637 HC RX REV CODE- 250/637: Performed by: HOSPITALIST

## 2021-11-23 PROCEDURE — 96366 THER/PROPH/DIAG IV INF ADDON: CPT

## 2021-11-23 PROCEDURE — 74011250636 HC RX REV CODE- 250/636: Performed by: STUDENT IN AN ORGANIZED HEALTH CARE EDUCATION/TRAINING PROGRAM

## 2021-11-23 PROCEDURE — 74011250636 HC RX REV CODE- 250/636

## 2021-11-23 PROCEDURE — 93005 ELECTROCARDIOGRAM TRACING: CPT

## 2021-11-23 PROCEDURE — 99285 EMERGENCY DEPT VISIT HI MDM: CPT

## 2021-11-23 PROCEDURE — 83605 ASSAY OF LACTIC ACID: CPT

## 2021-11-23 PROCEDURE — 85025 COMPLETE CBC W/AUTO DIFF WBC: CPT

## 2021-11-23 PROCEDURE — 82962 GLUCOSE BLOOD TEST: CPT

## 2021-11-23 PROCEDURE — 74011000250 HC RX REV CODE- 250: Performed by: HOSPITALIST

## 2021-11-23 PROCEDURE — 74011636637 HC RX REV CODE- 636/637: Performed by: HOSPITALIST

## 2021-11-23 PROCEDURE — 36600 WITHDRAWAL OF ARTERIAL BLOOD: CPT

## 2021-11-23 PROCEDURE — 80307 DRUG TEST PRSMV CHEM ANLYZR: CPT

## 2021-11-23 PROCEDURE — 82803 BLOOD GASES ANY COMBINATION: CPT

## 2021-11-23 PROCEDURE — 74011250636 HC RX REV CODE- 250/636: Performed by: HOSPITALIST

## 2021-11-23 PROCEDURE — 87635 SARS-COV-2 COVID-19 AMP PRB: CPT

## 2021-11-23 RX ORDER — ARFORMOTEROL TARTRATE 15 UG/2ML
15 SOLUTION RESPIRATORY (INHALATION)
Status: DISCONTINUED | OUTPATIENT
Start: 2021-11-23 | End: 2021-11-26 | Stop reason: HOSPADM

## 2021-11-23 RX ORDER — ARIPIPRAZOLE 5 MG/1
5 TABLET ORAL DAILY
Status: DISCONTINUED | OUTPATIENT
Start: 2021-11-24 | End: 2021-11-26 | Stop reason: HOSPADM

## 2021-11-23 RX ORDER — INSULIN LISPRO 100 [IU]/ML
INJECTION, SOLUTION INTRAVENOUS; SUBCUTANEOUS
Status: DISCONTINUED | OUTPATIENT
Start: 2021-11-23 | End: 2021-11-25

## 2021-11-23 RX ORDER — BUDESONIDE 0.5 MG/2ML
500 INHALANT ORAL
Status: DISCONTINUED | OUTPATIENT
Start: 2021-11-23 | End: 2021-11-26 | Stop reason: HOSPADM

## 2021-11-23 RX ORDER — PANTOPRAZOLE SODIUM 40 MG/1
40 TABLET, DELAYED RELEASE ORAL
Status: DISCONTINUED | OUTPATIENT
Start: 2021-11-24 | End: 2021-11-26 | Stop reason: HOSPADM

## 2021-11-23 RX ORDER — DULOXETIN HYDROCHLORIDE 20 MG/1
20 CAPSULE, DELAYED RELEASE ORAL DAILY
Status: DISCONTINUED | OUTPATIENT
Start: 2021-11-24 | End: 2021-11-26 | Stop reason: HOSPADM

## 2021-11-23 RX ORDER — MAGNESIUM SULFATE 1 G/100ML
1 INJECTION INTRAVENOUS
Status: COMPLETED | OUTPATIENT
Start: 2021-11-23 | End: 2021-11-23

## 2021-11-23 RX ORDER — IPRATROPIUM BROMIDE AND ALBUTEROL SULFATE 2.5; .5 MG/3ML; MG/3ML
3 SOLUTION RESPIRATORY (INHALATION)
Status: DISCONTINUED | OUTPATIENT
Start: 2021-11-23 | End: 2021-11-26 | Stop reason: HOSPADM

## 2021-11-23 RX ORDER — MAGNESIUM SULFATE 1 G/100ML
INJECTION INTRAVENOUS
Status: COMPLETED
Start: 2021-11-23 | End: 2021-11-23

## 2021-11-23 RX ORDER — MAGNESIUM SULFATE 100 %
16 CRYSTALS MISCELLANEOUS AS NEEDED
Status: DISCONTINUED | OUTPATIENT
Start: 2021-11-23 | End: 2021-11-26 | Stop reason: HOSPADM

## 2021-11-23 RX ORDER — DEXTROSE 50 % IN WATER (D50W) INTRAVENOUS SYRINGE
50 AS NEEDED
Status: DISCONTINUED | OUTPATIENT
Start: 2021-11-23 | End: 2021-11-26 | Stop reason: HOSPADM

## 2021-11-23 RX ORDER — MAGNESIUM SULFATE HEPTAHYDRATE 500 MG/ML
2 INJECTION, SOLUTION INTRAMUSCULAR; INTRAVENOUS
Status: DISCONTINUED | OUTPATIENT
Start: 2021-11-23 | End: 2021-11-23

## 2021-11-23 RX ORDER — DABIGATRAN ETEXILATE 75 MG/1
150 CAPSULE ORAL EVERY 12 HOURS
Status: DISCONTINUED | OUTPATIENT
Start: 2021-11-23 | End: 2021-11-26 | Stop reason: HOSPADM

## 2021-11-23 RX ORDER — IPRATROPIUM BROMIDE AND ALBUTEROL SULFATE 2.5; .5 MG/3ML; MG/3ML
3 SOLUTION RESPIRATORY (INHALATION)
Status: DISPENSED | OUTPATIENT
Start: 2021-11-23 | End: 2021-11-23

## 2021-11-23 RX ADMIN — INSULIN LISPRO 4 UNITS: 100 INJECTION, SOLUTION INTRAVENOUS; SUBCUTANEOUS at 16:30

## 2021-11-23 RX ADMIN — METHYLPREDNISOLONE SODIUM SUCCINATE 40 MG: 40 INJECTION, POWDER, FOR SOLUTION INTRAMUSCULAR; INTRAVENOUS at 21:36

## 2021-11-23 RX ADMIN — METHYLPREDNISOLONE SODIUM SUCCINATE 40 MG: 40 INJECTION, POWDER, FOR SOLUTION INTRAMUSCULAR; INTRAVENOUS at 14:10

## 2021-11-23 RX ADMIN — MAGNESIUM SULFATE HEPTAHYDRATE 1 G: 1 INJECTION, SOLUTION INTRAVENOUS at 06:13

## 2021-11-23 RX ADMIN — DABIGATRAN ETEXILATE MESYLATE 150 MG: 75 CAPSULE ORAL at 21:36

## 2021-11-23 RX ADMIN — IPRATROPIUM BROMIDE AND ALBUTEROL SULFATE 3 ML: .5; 3 SOLUTION RESPIRATORY (INHALATION) at 18:31

## 2021-11-23 RX ADMIN — BUDESONIDE 500 MCG: 0.5 INHALANT RESPIRATORY (INHALATION) at 12:06

## 2021-11-23 RX ADMIN — INSULIN LISPRO 4 UNITS: 100 INJECTION, SOLUTION INTRAVENOUS; SUBCUTANEOUS at 21:36

## 2021-11-23 RX ADMIN — MAGNESIUM SULFATE HEPTAHYDRATE 1 G: 1 INJECTION, SOLUTION INTRAVENOUS at 07:01

## 2021-11-23 RX ADMIN — DOXYCYCLINE 100 MG: 100 INJECTION, POWDER, LYOPHILIZED, FOR SOLUTION INTRAVENOUS at 06:20

## 2021-11-23 RX ADMIN — WATER 1 G: 1 INJECTION INTRAMUSCULAR; INTRAVENOUS; SUBCUTANEOUS at 12:14

## 2021-11-23 RX ADMIN — DABIGATRAN ETEXILATE MESYLATE 150 MG: 75 CAPSULE ORAL at 14:11

## 2021-11-23 RX ADMIN — AZITHROMYCIN MONOHYDRATE 500 MG: 500 INJECTION, POWDER, LYOPHILIZED, FOR SOLUTION INTRAVENOUS at 12:15

## 2021-11-23 RX ADMIN — ARFORMOTEROL TARTRATE 15 MCG: 15 SOLUTION RESPIRATORY (INHALATION) at 19:23

## 2021-11-23 RX ADMIN — ARFORMOTEROL TARTRATE 15 MCG: 15 SOLUTION RESPIRATORY (INHALATION) at 12:06

## 2021-11-23 RX ADMIN — BUDESONIDE 500 MCG: 0.5 INHALANT RESPIRATORY (INHALATION) at 19:23

## 2021-11-23 RX ADMIN — IPRATROPIUM BROMIDE AND ALBUTEROL SULFATE 3 ML: .5; 3 SOLUTION RESPIRATORY (INHALATION) at 12:06

## 2021-11-23 NOTE — ED TRIAGE NOTES
Pt to ED via EMS, Cc: SOB x several days. Reports he normally gets sick at this time of year and has to be hosptialized. Has been using his home nebs more frequently with little improvement.     EMS gave pt a Duo Neb, then an additional Albuterol neb and Solumedrol IV with minimal improvement

## 2021-11-23 NOTE — DISCHARGE INSTRUCTIONS
DISCHARGE SUMMARY from Nurse    PATIENT INSTRUCTIONS:      Report the following to your physician:    · Temperature over 101.5  · Nausea and vomiting lasting longer than 6 hours or if unable to take medications  · Any signs of decreased circulation or nerve impairment to extremity: change in color, persistent  numbness, tingling, coldness or increase pain  · Any questions    What to do at Home:  Recommended activity: as tolerated. If you experience any of the following symptoms shortness of breath, chest pain, please follow up with physician. *  Please give a list of your current medications to your Primary Care Provider. *  Please update this list whenever your medications are discontinued, doses are      changed, or new medications (including over-the-counter products) are added. *  Please carry medication information at all times in case of emergency situations. These are general instructions for a healthy lifestyle:    No smoking/ No tobacco products/ Avoid exposure to second hand smoke  Surgeon General's Warning:  Quitting smoking now greatly reduces serious risk to your health. Obesity, smoking, and sedentary lifestyle greatly increases your risk for illness    A healthy diet, regular physical exercise & weight monitoring are important for maintaining a healthy lifestyle    You may be retaining fluid if you have a history of heart failure or if you experience any of the following symptoms:  Weight gain of 3 pounds or more overnight or 5 pounds in a week, increased swelling in our hands or feet or shortness of breath while lying flat in bed. Please call your doctor as soon as you notice any of these symptoms; do not wait until your next office visit. The discharge information has been reviewed with the {PATIENT PARENT GUARDIAN:89550}. The {PATIENT PARENT GUARDIAN:06400} verbalized understanding.   Discharge medications reviewed with the {"[employer] meds reviewed [id number]} and appropriate educational materials and side effects teaching were provided.   ___________________________________________________________________________________________________________________________________

## 2021-11-23 NOTE — PROGRESS NOTES
Assumed care of pt from ED RN. Pt on high flow no sign of distress.   1635: Pt was transitioned from high flow to NC and is doing well at 98%

## 2021-11-23 NOTE — H&P
History & Physical    Patient: Ailyn Silva MRN: [id number]  CSN: [id number]    YOB: 1950  Age: 70 y.o. Sex: male      DOA: 11/23/2021  Primary Care Provider:  Micah, MD Keisha      Assessment/Plan     Patient Active Problem List   Diagnosis Code    Cocaine abuse ([facility] F14.10    Alcohol abuse F10.10    COPD [facility]chronic obstructive pulmonary disease) ([facility] J44.9    COPD exacerbation ([facility] J44.1    Dyslipidemia E78.5    Gastroesophageal reflux disease without esophagitis K21.9    Problem related to housing and economic circumstances Z59.9    Vitamin D deficiency disease E55.9    Asthma J45.909    Chronic deep vein thrombosis (DVT) ([facility] I82.509    Obstructive sleep apnea G47.33    Major depression F32.9    Hypertension I10    Anxiety D17.7    Mild diastolic dysfunction P69.6    AAA (abdominal aortic aneurysm) ([facility] I71.4    Acute respiratory failure with hypoxia ([facility] J96.01    Pneumonia J18.9       Admit to telemetry    COPD exacerbation -  Intravenous steroids. pulmicort and brovana. Pneumonia  Started on antibiotics. Follow respiratory cultures    Acute respiratory failure with hypoxia -  Continue with high flow oxygen  If patient does not respond to the above measures will get ABG and will initiate NPPV. Chronic DVT -  Is on pradaxa as prescribed by [place]. NEIL -  Has CPAP at home but not using it. Reports that it is supposed to be cleaned and he is waiting for new one. HTN -  Controlled  Monitor BP    GERD -  On PPI    Estimated length of stay : 2-3 days    CC: SOB       HPI:     Ailyn Silva is a 70 y.o. male with COPD/asthma, chronic DVT, cocaine use, hypertension, depression, NEIL on CPAP presents to ER with concerns of shortness of breath. Patient reports that he has been having shortness of breath for the past 1 month. He was seen in ER at [facility] and at this hospital twice in the past 1 month.   He reports that over the past few days his shortness of breath has been getting worse. He has been using his nebulizer and inhaler with no improvement. This morning his shortness of breath worse and he called EMS. He was given Solu-Medrol and neb treatment. He was noted to be hypoxic. He was placed on nonrebreather by EMS. Other symptoms include cough that is productive. He reports that he has sleep apnea and he has CPAP machine at home but he has not been using it because it requires cleaning. He is waiting for a new machine. Received 2 doses of COVID 19 vaccine, did not get booster yet. In ER he required to be placed on high flow oxygen. His respirations 28, cardiac enzymes in normal range, chest x-ray showed patchy right upper lung parenchymal infiltrates. Past Medical History:   Diagnosis Date    Alcohol abuse     Anxiety     Asthma     Bronchitis     Chronic deep [facility] thrombosis (DVT) ([facility]) 07/2013 07/2013, 12/2014 ([facility])    Cocaine abuse ([facility]     COPD     Depression     GERD (gastroesophageal reflux disease)     Hyperlipidemia     Hypertension     Major depression     Mild diastolic dysfunction     NEIL on CPAP     Renal insufficiency     Suicidal ideation     Vitamin D deficiency        Past Surgical History:   Procedure Laterality Date    HX HIP REPLACEMENT  08/2010    HX OTHER SURGICAL  06/2018    ENDOVASCULAR ANEURYSM REPAIR       History reviewed. No pertinent family history. Social History     Socioeconomic History    Marital status: SINGLE   Tobacco Use    Smoking status: Former Smoker    Smokeless tobacco: Never Used    Tobacco comment: States that he quit 12 years ago. Substance and Sexual Activity    Alcohol use: Yes    Drug use: Yes     Types: Cocaine       Prior to Admission medications    Medication Sig Start Date End Date Taking? Authorizing Provider   azithromycin (Zithromax TRI-JOSE) 500 mg tab Take 1 Tablet by mouth daily. 11/2/21   Irvin Gipson MD   DULoxetine (CYMBALTA) 20 mg capsule 20 mg. Provider, Historical   therapeutic multivitamin-minerals (THERAGRAN-M) tablet Take 1 Tab by Mouth Once a Day. 6/16/18   Provider, Historical   traMADol (ULTRAM) 50 mg tablet 50 mg. 6/15/18   Provider, Historical   ARIPiprazole (ABILIFY) 5 mg tablet Take 1 Tab by mouth daily. Indications: DEPRESSION TREATMENT ADJUNCT 2/21/17   Rudy Soliz MD   dabigatran etexilate (PRADAXA) 150 mg capsule Take 1 Cap by mouth every twelve (12) hours. Indications: PREVENT THROMBOEMBOLISM IN CHRONIC ATRIAL FIBRILLATION, PREVENTION OF DEEP VEIN THROMBOSIS RECURRENCE 2/21/17   Rudy Soliz MD   albuterol (PROVENTIL HFA, VENTOLIN HFA, PROAIR HFA) 90 mcg/actuation inhaler Take 2 Puffs by inhalation every four (4) hours as needed for Wheezing or Shortness of Breath (Cough). 2/10/17   SHERRIE Decker   inhalational spacing device ALWAYS USE WITH INHALER 2/10/17   Rosalba Decker   SPIRIVA WITH HANDIHALER 18 mcg inhalation capsule Take 1 Cap by inhalation daily. 2/1/17 Dr. Yonas Trinh 2/1/17   Other, MD Keisha   omeprazole (PRILOSEC) 20 mg capsule Take 1 Cap by mouth two (2) times a day. 2/1/17, QTY#60, 30 Days. 2/1/17 Dr. Yonas Trinh 2/1/17   Other, MD Keisah   STOOL SOFTENER 100 mg capsule Take 1 Cap by mouth daily. 2/1/17, QTY#100 Caps, 30 Days. Dr. Yonas Trinh 2/1/17   Other, MD Keisha   budesonide-formoterol [facility]) 160-4.5 mcg/actuation HFA inhaler Take 2 Puffs by inhalation two (2) times a day. Provider, Historical   dabigatran etexilate (PRADAXA) 150 mg capsule Take 150 mg by mouth every twelve (12) hours. Provider, Historical   albuterol-ipratropium (DUONEB) 2.5 mg-0.5 mg/3 ml nebu 3 mL by Nebulization route every six (6) hours as needed. Provider, Historical       Allergies   Allergen Reactions    Shellfish Derived Anaphylaxis       Review of Systems  Gen: No fever, chills, malaise, weight loss/gain. Heent: No headache, rhinorrhea, epistaxis, ear pain, hearing loss, sinus pain, neck pain/stiffness, sore throat. Heart: No chest pain, palpitations, WREN, pnd, or orthopnea. Resp: see above. GI: No nausea, vomiting, diarrhea, constipation, melena or hematochezia. : No urinary obstruction, dysuria or hematuria. Derm: No rash, new skin lesion or pruritis. Musc/skeletal: no bone or joint complains. Vasc: No edema, cyanosis or claudication. Endo: No heat/cold intolerance, no polyuria,polydipsia or polyphagia. Neuro: No unilateral weakness, numbness, tingling. No seizures. Heme: No easy bruising or bleeding. Physical Exam:     Physical Exam:  Visit Vitals  /74   Pulse 87   Temp 98.3 °F (36.8 °C)   Resp 20   Ht 6' 1\" (1.854 m)   Wt 97.5 kg (215 lb)   SpO2 100%   BMI 28.37 kg/m²    O2 Flow Rate (L/min): (S) 4 l/min O2 Device: (S) Nasal cannula    Temp (24hrs), Av.6 °F (37 °C), Min:98.3 °F (36.8 °C), Max:98.8 °F (37.1 °C)    No intake/output data recorded. No intake/output data recorded. General:  Awake, cooperative, no distress. Head:  Normocephalic, without obvious abnormality, atraumatic. Eyes:  Conjunctivae/corneas clear, sclera anicteric, PERRL, EOMs intact. Nose: Nares normal. No drainage or sinus tenderness. Throat: Lips, mucosa, and tongue normal.    Neck: Supple, symmetrical, trachea midline, no adenopathy. Lungs:   wheezing bilaterally. Heart:   S1, S2, no murmur, click, rub or gallop. Abdomen: Soft, non-tender. Bowel sounds normal. No masses,  No organomegaly. Extremities: Extremities normal, atraumatic, no cyanosis or edema. Capillary refill normal.   Pulses: 2+ and symmetric all extremities. Skin: Skin color pink, turgor normal. No rashes or lesions   Neurologic: CNII-XII intact. No focal motor or sensory deficit.        Labs Reviewed:    CMP:   Lab Results   Component Value Date/Time     2021 06:10 AM    K 3.8 2021 06:10 AM     2021 06:10 AM    CO2 27 2021 06:10 AM    AGAP 5 2021 06:10 AM     (H) 11/23/2021 06:10 AM    BUN 9 11/23/2021 06:10 AM    CREA 1.14 11/23/2021 06:10 AM    GFRAA >60 11/23/2021 06:10 AM    GFRNA >60 11/23/2021 06:10 AM    CA 8.6 11/23/2021 06:10 AM     CBC:   Lab Results   Component Value Date/Time    WBC 7.4 11/23/2021 06:10 AM    HGB 13.5 11/23/2021 06:10 AM    HCT 43.0 11/23/2021 06:10 AM     11/23/2021 06:10 AM     All Cardiac Markers in the last 24 hours:   Lab Results   Component Value Date/Time    CPK 84 11/23/2021 06:10 AM    CKMB <1.0 11/23/2021 06:10 AM    CKND1  11/23/2021 06:10 AM     CALCULATION NOT PERFORMED WHEN RESULT IS BELOW LINEAR LIMIT    TROIQ 0.02 11/23/2021 06:10 AM         Procedures/imaging: see electronic medical records for all procedures/Xrays and details which were not copied into this note but were reviewed prior to creation of Plan    Please note that this dictation was completed with [employer], the [employer] voice recognition software. Quite often unanticipated grammatical, syntax, homophones, and other interpretive errors are inadvertently transcribed by the computer software. Please disregard these errors. Please excuse any errors that have escaped final proofreading.         CC: Micah, MD Keisha

## 2021-11-24 LAB
ANION GAP SERPL CALC-SCNC: 6 MMOL/L (ref 3–18)
BUN SERPL-MCNC: 15 MG/DL (ref 7–18)
BUN/CREAT SERPL: 14 (ref 12–20)
CALCIUM SERPL-MCNC: 8.6 MG/DL (ref 8.5–10.1)
CHLORIDE SERPL-SCNC: 108 MMOL/L (ref 100–111)
CO2 SERPL-SCNC: 26 MMOL/L (ref 21–32)
CREAT SERPL-MCNC: 1.11 MG/DL (ref 0.6–1.3)
ERYTHROCYTE [DISTWIDTH] IN BLOOD BY AUTOMATED COUNT: 14.8 % (ref 11.6–14.5)
EST. AVERAGE GLUCOSE BLD GHB EST-MCNC: 143 MG/DL
GLUCOSE BLD STRIP.AUTO-MCNC: 166 MG/DL (ref 70–110)
GLUCOSE BLD STRIP.AUTO-MCNC: 173 MG/DL (ref 70–110)
GLUCOSE BLD STRIP.AUTO-MCNC: 197 MG/DL (ref 70–110)
GLUCOSE BLD STRIP.AUTO-MCNC: 218 MG/DL (ref 70–110)
GLUCOSE SERPL-MCNC: 257 MG/DL (ref 74–99)
HBA1C MFR BLD: 6.6 % (ref 4.2–5.6)
HCT VFR BLD AUTO: 40.2 % (ref 36–48)
HGB BLD-MCNC: 12.7 G/DL (ref 13–16)
MCH RBC QN AUTO: 30 PG (ref 24–34)
MCHC RBC AUTO-ENTMCNC: 31.6 G/DL (ref 31–37)
MCV RBC AUTO: 95 FL (ref 78–100)
NRBC # BLD: 0 K/UL (ref 0–0.01)
NRBC BLD-RTO: 0 PER 100 WBC
PLATELET # BLD AUTO: 181 K/UL (ref 135–420)
PMV BLD AUTO: 10 FL (ref 9.2–11.8)
POTASSIUM SERPL-SCNC: 4.6 MMOL/L (ref 3.5–5.5)
RBC # BLD AUTO: 4.23 M/UL (ref 4.35–5.65)
SODIUM SERPL-SCNC: 140 MMOL/L (ref 136–145)
WBC # BLD AUTO: 8 K/UL (ref 4.6–13.2)

## 2021-11-24 PROCEDURE — 74011250637 HC RX REV CODE- 250/637: Performed by: HOSPITALIST

## 2021-11-24 PROCEDURE — 94640 AIRWAY INHALATION TREATMENT: CPT

## 2021-11-24 PROCEDURE — 82962 GLUCOSE BLOOD TEST: CPT

## 2021-11-24 PROCEDURE — 83036 HEMOGLOBIN GLYCOSYLATED A1C: CPT

## 2021-11-24 PROCEDURE — 74011000250 HC RX REV CODE- 250: Performed by: HOSPITALIST

## 2021-11-24 PROCEDURE — 36415 COLL VENOUS BLD VENIPUNCTURE: CPT

## 2021-11-24 PROCEDURE — 77010033678 HC OXYGEN DAILY

## 2021-11-24 PROCEDURE — 74011250636 HC RX REV CODE- 250/636: Performed by: HOSPITALIST

## 2021-11-24 PROCEDURE — 65660000000 HC RM CCU STEPDOWN

## 2021-11-24 PROCEDURE — 74011250636 HC RX REV CODE- 250/636: Performed by: FAMILY MEDICINE

## 2021-11-24 PROCEDURE — 80048 BASIC METABOLIC PNL TOTAL CA: CPT

## 2021-11-24 PROCEDURE — 74011636637 HC RX REV CODE- 636/637: Performed by: HOSPITALIST

## 2021-11-24 PROCEDURE — 85027 COMPLETE CBC AUTOMATED: CPT

## 2021-11-24 RX ADMIN — AZITHROMYCIN MONOHYDRATE 500 MG: 500 INJECTION, POWDER, LYOPHILIZED, FOR SOLUTION INTRAVENOUS at 12:17

## 2021-11-24 RX ADMIN — INSULIN LISPRO 2 UNITS: 100 INJECTION, SOLUTION INTRAVENOUS; SUBCUTANEOUS at 06:34

## 2021-11-24 RX ADMIN — ARFORMOTEROL TARTRATE 15 MCG: 15 SOLUTION RESPIRATORY (INHALATION) at 07:10

## 2021-11-24 RX ADMIN — INSULIN LISPRO 2 UNITS: 100 INJECTION, SOLUTION INTRAVENOUS; SUBCUTANEOUS at 17:35

## 2021-11-24 RX ADMIN — WATER 1 G: 1 INJECTION INTRAMUSCULAR; INTRAVENOUS; SUBCUTANEOUS at 12:16

## 2021-11-24 RX ADMIN — DULOXETINE HYDROCHLORIDE 20 MG: 20 CAPSULE, DELAYED RELEASE ORAL at 09:03

## 2021-11-24 RX ADMIN — DABIGATRAN ETEXILATE MESYLATE 150 MG: 75 CAPSULE ORAL at 22:19

## 2021-11-24 RX ADMIN — ARIPIPRAZOLE 5 MG: 5 TABLET ORAL at 09:03

## 2021-11-24 RX ADMIN — PANTOPRAZOLE SODIUM 40 MG: 40 TABLET, DELAYED RELEASE ORAL at 06:34

## 2021-11-24 RX ADMIN — INSULIN LISPRO 2 UNITS: 100 INJECTION, SOLUTION INTRAVENOUS; SUBCUTANEOUS at 12:17

## 2021-11-24 RX ADMIN — METHYLPREDNISOLONE SODIUM SUCCINATE 40 MG: 40 INJECTION, POWDER, FOR SOLUTION INTRAMUSCULAR; INTRAVENOUS at 06:34

## 2021-11-24 RX ADMIN — DABIGATRAN ETEXILATE MESYLATE 150 MG: 75 CAPSULE ORAL at 09:04

## 2021-11-24 RX ADMIN — METHYLPREDNISOLONE SODIUM SUCCINATE 60 MG: 40 INJECTION, POWDER, FOR SOLUTION INTRAMUSCULAR; INTRAVENOUS at 17:35

## 2021-11-24 RX ADMIN — ARFORMOTEROL TARTRATE 15 MCG: 15 SOLUTION RESPIRATORY (INHALATION) at 20:29

## 2021-11-24 RX ADMIN — BUDESONIDE 500 MCG: 0.5 INHALANT RESPIRATORY (INHALATION) at 07:10

## 2021-11-24 RX ADMIN — BUDESONIDE 500 MCG: 0.5 INHALANT RESPIRATORY (INHALATION) at 20:29

## 2021-11-24 RX ADMIN — INSULIN LISPRO 4 UNITS: 100 INJECTION, SOLUTION INTRAVENOUS; SUBCUTANEOUS at 22:19

## 2021-11-24 NOTE — DIABETES MGMT
Diabetes/ Glycemic Control Plan of Care  Recommendations:  Continue inpatient glucose monitoring and correctional insulin while patient is receiving steroid therapy. Glycemic control to continue to monitor and provide recommendations. Assessment: Patient without history of diabetes currently receiving correctional lispro for hyperglycemia likely caused by steroids received. 1. Acute hypoxemic respiratory failure ([facility]     2. COPD exacerbation ([facility]     3. Obstructive sleep apnea syndrome     4. Cocaine abuse ([facility]        Steroids:   Rx Glucocorticoids (24h ago, onward)             Start     Dose Route Frequency Ordered Stop    11/23/21 1400  methylPREDNISolone (PF) (SOLU-MEDROL) injection 40 mg         40 mg IV EVERY 8 HOURS 11/23/21 1145 --           Recent Glucose Results:   Lab Results   Component Value Date/Time     (H) 11/24/2021 03:50 AM    GLUCPOC 197 (H) 11/24/2021 06:21 AM    GLUCPOC 215 (H) 11/23/2021 09:02 PM    GLUCPOC 223 (H) 11/23/2021 05:02 PM         Within target range (70-180mg/dL): No  Current insulin orders: Correctional lispro  Total Daily Dose previous 24 hours = 10 units correctional lispro    Nutrition/Diet:   Active Orders   Diet    ADULT DIET Regular        Home diabetes medications:   Key Antihyperglycemic Medications     Patient is on no antihyperglycemic meds. Plan/Goals:   Blood glucose will be within target of 70 - 180 mg/dl within 72 hours  Reinforce dietary and medication compliance at home.            Education:  [] Refer to Diabetes Education Record                       [x] Education not indicated at this time     Kyle Quintanilla, [address] [place] [address]  Glycemic Control Team  [phone]

## 2021-11-24 NOTE — PROGRESS NOTES
Hospitalist Progress Note    Patient: Casey Wiggins MRN: [id number]  CSN: [id number]    YOB: 1950  Age: 70 y.o. Sex: male    DOA: 11/23/2021 LOS:  LOS: 1 day          Chief Complaint:    SOB    Assessment/Plan     COPD exacerbation -  Increase IV steroids  pulmicort and brovana.     Pneumonia  Started on antibiotics. Follow respiratory cultures     Acute respiratory failure with hypoxia -  Patient able to be weaned to nasal cannula overnight, currently on 4 L and satting at least 95%    Chronic DVT -  Is on [facility]     NEIL -  Has CPAP at home but not using it     HTN -  Controlled  Monitor BP     GERD -  On PPI     Estimated length of stay : 2-3 days     Disposition :  Patient Active Problem List   Diagnosis Code    Cocaine abuse ([facility] F14.10    Alcohol abuse F10.10    COPD (chronic obstructive pulmonary disease) ([facility] J44.9    COPD exacerbation ([facility] J44.1    Dyslipidemia E78.5    Gastroesophageal reflux disease without esophagitis K21.9    Problem related to housing and economic circumstances Z59.9    Vitamin D deficiency disease E55.9    Asthma J45.909    Chronic deep [facility] (DVT) ([facility] I82.509    Obstructive sleep apnea G47.33    Major depression F32.9    Hypertension I10    Anxiety H87.5    Mild diastolic dysfunction R81.1    AAA (abdominal aortic aneurysm) ([facility] I71.4    Acute respiratory failure with hypoxia ([facility] J96.01    Pneumonia J18.9       Subjective:    Wants to know when he can go home. Review of systems:    Constitutional: denies fevers, chills, myalgias  Respiratory: denies SOB, cough  Cardiovascular: denies chest pain, palpitations  Gastrointestinal: denies nausea, vomiting, diarrhea      Vital signs/Intake and Output:  Visit Vitals  BP (!) 140/78   Pulse 72   Temp 97.7 °F (36.5 °C)   Resp 18   Ht 6' 1\" (1.854 m)   Wt 97.5 kg (215 lb)   SpO2 97%   BMI 28.37 kg/m²     Current Shift:  No intake/output data recorded.   Last three shifts:  11/22 1901 - 11/24 0700  In: 500 [P.O.:240; I.V.:260]  Out: 750 [Urine:750]    Exam:    General: Well developed, alert, older AAM, NAD, OX3  Head/Neck: NCAT, supple, No masses, No lymphadenopathy  CVS:Regular rate and rhythm, no M/R/G, S1/S2 heard, no thrill  Lungs: wheezing heard throughout all lung fields  Abdomen: Soft, Nontender, No distention, Normal Bowel sounds, No hepatomegaly  Extremities: No C/C/E, pulses palpable 2+  Skin:normal texture and turgor, no rashes, no lesions  Neuro:grossly normal , follows commands  Psych:appropriate                Labs: Results:       Chemistry Recent Labs     11/24/21  0350 11/23/21  0610   * 109*    141   K 4.6 3.8    109   CO2 26 27   BUN 15 9   CREA 1.11 1.14   CA 8.6 8.6   AGAP 6 5   BUCR 14 8*      CBC w/Diff Recent Labs     11/24/21 0350 11/23/21  0610   WBC 8.0 7.4   RBC 4.23* 4.58   HGB 12.7* 13.5   HCT 40.2 43.0    192   GRANS  --  39*   LYMPH  --  37   EOS  --  15*      Cardiac Enzymes Recent Labs     11/23/21  0610   CPK 84   CKND1 CALCULATION NOT PERFORMED WHEN RESULT IS BELOW LINEAR LIMIT      Coagulation No results for input(s): PTP, INR, APTT, INREXT in the last 72 hours. Lipid Panel Lab Results   Component Value Date/Time    Cholesterol, total 188 04/03/2011 04:30 AM    HDL Cholesterol 89 (H) 04/03/2011 04:30 AM    LDL, calculated 87 04/03/2011 04:30 AM    VLDL, calculated 12 04/03/2011 04:30 AM    Triglyceride 60 04/03/2011 04:30 AM    CHOL/HDL Ratio 2.1 04/03/2011 04:30 AM      BNP No results for input(s): BNPP in the last 72 hours. Liver Enzymes No results for input(s): TP, ALB, TBIL, AP in the last 72 hours.     No lab exists for component: SGOT, GPT, DBIL   Thyroid Studies No results found for: T4, T3U, TSH, TSHEXT     Procedures/imaging: see electronic medical records for all procedures/Xrays and details which were not copied into this note but were reviewed prior to creation of Plan      Ward Rodriguez Mare Alegria MD

## 2021-11-24 NOTE — ROUTINE PROCESS
Bedside shift change report given to Sarai Argueta RN (oncoming nurse) by Claudia Foster RN (offgoing nurse). Report included the following information SBAR, Kardex, Intake/Output and MAR.

## 2021-11-24 NOTE — PROGRESS NOTES
Problem: Falls - Risk of  Goal: *Absence of Falls  Description: Document Leaftadeo Botello Fall Risk and appropriate interventions in the flowsheet.   11/24/2021 1100 by Mauricio England RN  Outcome: Progressing Towards Goal  Note: Fall Risk Interventions:            Medication Interventions: Evaluate medications/consider consulting pharmacy                11/24/2021 1100 by Mauricio England RN  Outcome: Progressing Towards Goal  Note: Fall Risk Interventions:            Medication Interventions: Evaluate medications/consider consulting pharmacy                   Problem: Patient Education: Go to Patient Education Activity  Goal: Patient/Family Education  Outcome: Progressing Towards Goal     Problem: Breathing Pattern - Ineffective  Goal: *Use of effective breathing techniques  Outcome: Progressing Towards Goal  Goal: *PALLIATIVE CARE:  Alleviation of Dyspnea  Outcome: Progressing Towards Goal     Problem: Patient Education: Go to Patient Education Activity  Goal: Patient/Family Education  Outcome: Progressing Towards Goal

## 2021-11-24 NOTE — PROGRESS NOTES
Pt seen and assessed. BBS are diminished with expir wheeze, pt tolerated HHN well via mask, pt is on 4L NC sp02 99%. Will continue to monitor and treat.  No distress noted,

## 2021-11-25 LAB
ANION GAP SERPL CALC-SCNC: 4 MMOL/L (ref 3–18)
BUN SERPL-MCNC: 18 MG/DL (ref 7–18)
BUN/CREAT SERPL: 18 (ref 12–20)
CALCIUM SERPL-MCNC: 8.4 MG/DL (ref 8.5–10.1)
CHLORIDE SERPL-SCNC: 110 MMOL/L (ref 100–111)
CO2 SERPL-SCNC: 27 MMOL/L (ref 21–32)
CREAT SERPL-MCNC: 1.02 MG/DL (ref 0.6–1.3)
ERYTHROCYTE [DISTWIDTH] IN BLOOD BY AUTOMATED COUNT: 14.9 % (ref 11.6–14.5)
GLUCOSE BLD STRIP.AUTO-MCNC: 181 MG/DL (ref 70–110)
GLUCOSE BLD STRIP.AUTO-MCNC: 217 MG/DL (ref 70–110)
GLUCOSE BLD STRIP.AUTO-MCNC: 275 MG/DL (ref 70–110)
GLUCOSE BLD STRIP.AUTO-MCNC: 280 MG/DL (ref 70–110)
GLUCOSE SERPL-MCNC: 196 MG/DL (ref 74–99)
HCT VFR BLD AUTO: 39.2 % (ref 36–48)
HGB BLD-MCNC: 12.3 G/DL (ref 13–16)
MCH RBC QN AUTO: 29.3 PG (ref 24–34)
MCHC RBC AUTO-ENTMCNC: 31.4 G/DL (ref 31–37)
MCV RBC AUTO: 93.3 FL (ref 78–100)
NRBC # BLD: 0 K/UL (ref 0–0.01)
NRBC BLD-RTO: 0 PER 100 WBC
PLATELET # BLD AUTO: 196 K/UL (ref 135–420)
PMV BLD AUTO: 10.1 FL (ref 9.2–11.8)
POTASSIUM SERPL-SCNC: 4.4 MMOL/L (ref 3.5–5.5)
RBC # BLD AUTO: 4.2 M/UL (ref 4.35–5.65)
SODIUM SERPL-SCNC: 141 MMOL/L (ref 136–145)
WBC # BLD AUTO: 9.9 K/UL (ref 4.6–13.2)

## 2021-11-25 PROCEDURE — 77010033678 HC OXYGEN DAILY

## 2021-11-25 PROCEDURE — 80048 BASIC METABOLIC PNL TOTAL CA: CPT

## 2021-11-25 PROCEDURE — 82962 GLUCOSE BLOOD TEST: CPT

## 2021-11-25 PROCEDURE — 74011250637 HC RX REV CODE- 250/637: Performed by: HOSPITALIST

## 2021-11-25 PROCEDURE — 74011250636 HC RX REV CODE- 250/636: Performed by: HOSPITALIST

## 2021-11-25 PROCEDURE — 85027 COMPLETE CBC AUTOMATED: CPT

## 2021-11-25 PROCEDURE — 74011250636 HC RX REV CODE- 250/636: Performed by: FAMILY MEDICINE

## 2021-11-25 PROCEDURE — 74011636637 HC RX REV CODE- 636/637: Performed by: HOSPITALIST

## 2021-11-25 PROCEDURE — 36415 COLL VENOUS BLD VENIPUNCTURE: CPT

## 2021-11-25 PROCEDURE — 94640 AIRWAY INHALATION TREATMENT: CPT

## 2021-11-25 PROCEDURE — 65660000000 HC RM CCU STEPDOWN

## 2021-11-25 PROCEDURE — 74011000250 HC RX REV CODE- 250: Performed by: HOSPITALIST

## 2021-11-25 RX ORDER — INSULIN LISPRO 100 [IU]/ML
INJECTION, SOLUTION INTRAVENOUS; SUBCUTANEOUS
Status: DISCONTINUED | OUTPATIENT
Start: 2021-11-25 | End: 2021-11-26 | Stop reason: HOSPADM

## 2021-11-25 RX ADMIN — INSULIN LISPRO 6 UNITS: 100 INJECTION, SOLUTION INTRAVENOUS; SUBCUTANEOUS at 16:59

## 2021-11-25 RX ADMIN — METHYLPREDNISOLONE SODIUM SUCCINATE 60 MG: 40 INJECTION, POWDER, FOR SOLUTION INTRAMUSCULAR; INTRAVENOUS at 06:12

## 2021-11-25 RX ADMIN — METHYLPREDNISOLONE SODIUM SUCCINATE 60 MG: 40 INJECTION, POWDER, FOR SOLUTION INTRAMUSCULAR; INTRAVENOUS at 17:00

## 2021-11-25 RX ADMIN — ARIPIPRAZOLE 5 MG: 5 TABLET ORAL at 09:08

## 2021-11-25 RX ADMIN — INSULIN LISPRO 2 UNITS: 100 INJECTION, SOLUTION INTRAVENOUS; SUBCUTANEOUS at 06:11

## 2021-11-25 RX ADMIN — DABIGATRAN ETEXILATE MESYLATE 150 MG: 75 CAPSULE ORAL at 09:08

## 2021-11-25 RX ADMIN — INSULIN LISPRO 9 UNITS: 100 INJECTION, SOLUTION INTRAVENOUS; SUBCUTANEOUS at 22:00

## 2021-11-25 RX ADMIN — INSULIN LISPRO 6 UNITS: 100 INJECTION, SOLUTION INTRAVENOUS; SUBCUTANEOUS at 13:21

## 2021-11-25 RX ADMIN — PANTOPRAZOLE SODIUM 40 MG: 40 TABLET, DELAYED RELEASE ORAL at 06:12

## 2021-11-25 RX ADMIN — ARFORMOTEROL TARTRATE 15 MCG: 15 SOLUTION RESPIRATORY (INHALATION) at 20:22

## 2021-11-25 RX ADMIN — DULOXETINE HYDROCHLORIDE 20 MG: 20 CAPSULE, DELAYED RELEASE ORAL at 09:08

## 2021-11-25 RX ADMIN — WATER 1 G: 1 INJECTION INTRAMUSCULAR; INTRAVENOUS; SUBCUTANEOUS at 11:10

## 2021-11-25 RX ADMIN — BUDESONIDE 500 MCG: 0.5 INHALANT RESPIRATORY (INHALATION) at 20:22

## 2021-11-25 RX ADMIN — METHYLPREDNISOLONE SODIUM SUCCINATE 60 MG: 40 INJECTION, POWDER, FOR SOLUTION INTRAMUSCULAR; INTRAVENOUS at 11:10

## 2021-11-25 RX ADMIN — BUDESONIDE 500 MCG: 0.5 INHALANT RESPIRATORY (INHALATION) at 09:02

## 2021-11-25 RX ADMIN — AZITHROMYCIN MONOHYDRATE 500 MG: 500 INJECTION, POWDER, LYOPHILIZED, FOR SOLUTION INTRAVENOUS at 11:10

## 2021-11-25 RX ADMIN — METHYLPREDNISOLONE SODIUM SUCCINATE 60 MG: 40 INJECTION, POWDER, FOR SOLUTION INTRAMUSCULAR; INTRAVENOUS at 00:52

## 2021-11-25 RX ADMIN — ARFORMOTEROL TARTRATE 15 MCG: 15 SOLUTION RESPIRATORY (INHALATION) at 09:02

## 2021-11-25 RX ADMIN — DABIGATRAN ETEXILATE MESYLATE 150 MG: 75 CAPSULE ORAL at 22:21

## 2021-11-25 NOTE — PROGRESS NOTES
[address] of pt from [facility]    1500 Weaned to 1.5 L NC. Tolerating well at 97%    1700 Pt weaned off oxygen. SPo2 95 % on RA    1900 Bedside and Verbal shift change report given to Troy Flood RN (oncoming nurse) by Mendez Barr RN (offgoing nurse). Report included the following information SBAR, Kardex, MAR, Recent Results and Cardiac Rhythm .

## 2021-11-25 NOTE — PROGRESS NOTES
Shift summary: the patient rested well overnight with no new clinical concerns noted. Bedside and Verbal shift change report given to Valentina Arce RN (oncoming nurse) by Anne Byers RN (offgoing nurse).  Report included the following information SBAR, Kardex, Intake/Output, MAR, Recent Results, and Cardiac Rhythm: SR.

## 2021-11-25 NOTE — PROGRESS NOTES
Hospitalist Progress Note    Patient: Alexandre Ibanez MRN: [id number]  CSN: [id number]    YOB: 1950  Age: 70 y.o. Sex: male    DOA: 11/23/2021 LOS:  LOS: 2 days          Chief Complaint:    SOB    Assessment/Plan     COPD exacerbation -  Increase IV steroids  pulmicort and brovana.     Pneumonia  Started on antibiotics. Follow respiratory cultures     Acute respiratory failure with hypoxia -  Weaned to room air and holding sats >95%    Chronic DVT -  Is [facility]     NEIL -  Has CPAP at home but not using it     HTN -  Controlled  Monitor BP     GERD -  On PPI     Estimated length of stay : 2-3 days     Disposition :  Patient Active Problem List   Diagnosis Code    Cocaine abuse ([facility] F14.10    Alcohol abuse F10.10    COPD (chronic obstructive pulmonary disease) ([facility] J44.9    [facility] J44.1    Dyslipidemia E78.5    Gastroesophageal reflux disease without esophagitis K21.9    Problem related to housing and economic circumstances Z59.9    Vitamin D deficiency disease E55.9    Asthma J45.909    Chronic deep vein thrombosis (DVT) ([facility] I82.509    Obstructive sleep apnea G47.33    Major depression F32.9    Hypertension I10    Anxiety X85.8    Mild diastolic dysfunction N98.2    AAA (abdominal aortic aneurysm) ([facility] I71.4    Acute respiratory failure with hypoxia ([facility] J96.01    Pneumonia J18.9       Subjective:    Getting breathing treatment   Still having wheezing     Review of systems:    Constitutional: denies fevers, chills, myalgias  Respiratory: denies SOB, cough  Cardiovascular: denies chest pain, palpitations  Gastrointestinal: denies nausea, vomiting, diarrhea      Vital signs/Intake and Output:  Visit Vitals  BP (!) 140/93   Pulse 69   Temp 97.5 °F (36.4 °C)   Resp 18   Ht 6' 1\" (1.854 m)   Wt 97.5 kg (215 lb)   SpO2 99%   BMI 28.37 kg/m²     Current Shift:  No intake/output data recorded.   Last three shifts:  11/23 1901 - 11/25 0700  In: 1440 [P.O.:1440]  Out: 1625 [Urine:1625]    Exam:    General: Well developed, alert, older AAM, NAD, OX3  Head/Neck: NCAT, supple, No masses, No lymphadenopathy  CVS:Regular rate and rhythm, no M/R/G, S1/S2 heard, no thrill  Lungs: expiratory wheezing heard throughout all lung fields  Abdomen: Soft, Nontender, No distention, Normal Bowel sounds, No hepatomegaly  Extremities: No C/C/E, pulses palpable 2+  Skin:normal texture and turgor, no rashes, no lesions  Neuro:grossly normal , follows commands  Psych:appropriate                Labs: Results:       Chemistry Recent Labs     11/25/21  0200 11/24/21  0350 11/23/21  0610   * 257* 109*    140 141   K 4.4 4.6 3.8    108 109   CO2 27 26 27   BUN 18 15 9   CREA 1.02 1.11 1.14   CA 8.4* 8.6 8.6   AGAP 4 6 5   BUCR 18 14 8*      CBC w/Diff Recent Labs     11/25/21  0200 11/24/21  0350 11/23/21  0610   WBC 9.9 8.0 7.4   RBC 4.20* 4.23* 4.58   HGB 12.3* 12.7* 13.5   HCT 39.2 40.2 43.0    181 192   GRANS  --   --  39*   LYMPH  --   --  37   EOS  --   --  15*      Cardiac Enzymes Recent Labs     11/23/21  0610   CPK 84   CKND1 CALCULATION NOT PERFORMED WHEN RESULT IS BELOW LINEAR LIMIT      Coagulation No results for input(s): PTP, INR, APTT, INREXT, INREXT in the last 72 hours. Lipid Panel Lab Results   Component Value Date/Time    Cholesterol, total 188 04/03/2011 04:30 AM    HDL Cholesterol 89 (H) 04/03/2011 04:30 AM    LDL, calculated 87 04/03/2011 04:30 AM    VLDL, calculated 12 04/03/2011 04:30 AM    Triglyceride 60 04/03/2011 04:30 AM    CHOL/HDL Ratio 2.1 04/03/2011 04:30 AM      BNP No results for input(s): BNPP in the last 72 hours. Liver Enzymes No results for input(s): TP, ALB, TBIL, AP in the last 72 hours.     No lab exists for component: SGOT, GPT, DBIL   Thyroid Studies No results found for: T4, T3U, TSH, TSHEXT, TSHEXT     Procedures/imaging: see electronic medical records for all procedures/Xrays and details which were not copied into this note but were reviewed prior to creation of David James MD

## 2021-11-25 NOTE — PROGRESS NOTES
Reason for Admission:  Chart reviewed; per H&P, patient is a \"76 y.o. male with COPD/asthma, chronic DVT, cocaine use, hypertension, depression, NEIL on CPAP presents to ER with concerns of shortness of breath. Patient reports that he has been having shortness of breath for the past 1 month. He was seen in ER at [facility] and at this hospital twice in the past 1 month. He reports that over the past few days his shortness of breath has been getting worse. He has been using his nebulizer and inhaler with no improvement. This morning his shortness of breath worse and he called EMS. He was given Solu-Medrol and neb treatment. He was noted to be hypoxic\"                     RUR Score:         Low, 12%            Plan for utilizing home health:    TBD      PCP: First and Last name:  Keisha Obrien MD     Name of Practice:    Are you a current patient: Yes/No:    Approximate date of last visit:    Can you participate in a virtual visit with your PCP:                     Current Advanced Directive/Advance Care Plan: Full Code      Healthcare Decision Maker:   Click here to complete [place] including selection of the Healthcare Decision Maker Relationship (ie \"Primary\")                             Transition of Care Plan:     HFNC tapered to 3L NC; care manager will follow for discharge needs.                  Care Management Interventions  Transition of Care Consult (CM Consult): Discharge Planning  Support Systems: Other Family Member(s)  Confirm Follow Up Transport: Family

## 2021-11-25 NOTE — PROGRESS NOTES
Problem: Falls - Risk of  Goal: *Absence of Falls  Description: Document Rocio Collier Fall Risk and appropriate interventions in the flowsheet. Outcome: Progressing Towards Goal  Note: Fall Risk Interventions:            Medication Interventions: Teach patient to arise slowly                   Problem: Patient Education: Go to Patient Education Activity  Goal: Patient/Family Education  Outcome: Progressing Towards Goal     Problem: Breathing Pattern - Ineffective  Goal: *Absence of hypoxia  Outcome: Progressing Towards Goal  Goal: *Use of effective breathing techniques  Outcome: Progressing Towards Goal     Problem: Diabetes Self-Management  Goal: *Disease process and treatment process  Description: Define diabetes and identify own type of diabetes; list 3 options for treating diabetes. Outcome: Progressing Towards Goal  Goal: *Incorporating nutritional management into lifestyle  Description: Describe effect of type, amount and timing of food on blood glucose; list 3 methods for planning meals. Outcome: Progressing Towards Goal  Goal: *Incorporating physical activity into lifestyle  Description: State effect of exercise on blood glucose levels. Outcome: Progressing Towards Goal  Goal: *Developing strategies to promote health/change behavior  Description: Define the ABC's of diabetes; identify appropriate screenings, schedule and personal plan for screenings. Outcome: Progressing Towards Goal  Goal: *Using medications safely  Description: State effect of diabetes medications on diabetes; name diabetes medication taking, action and side effects. Outcome: Progressing Towards Goal  Goal: *Monitoring blood glucose, interpreting and using results  Description: Identify recommended blood glucose targets  and personal targets.   Outcome: Progressing Towards Goal  Goal: *Prevention, detection, treatment of acute complications  Description: List symptoms of hyper- and hypoglycemia; describe how to treat low blood sugar and actions for lowering  high blood glucose level. Outcome: Progressing Towards Goal  Goal: *Prevention, detection and treatment of chronic complications  Description: Define the natural course of diabetes and describe the relationship of blood glucose levels to long term complications of diabetes.   Outcome: Progressing Towards Goal

## 2021-11-25 NOTE — PROGRESS NOTES
Problem: Falls - Risk of  Goal: *Absence of Falls  Description: Document Chu Rodas Fall Risk and appropriate interventions in the flowsheet. Outcome: Progressing Towards Goal  Note: Fall Risk Interventions:            Medication Interventions: Teach patient to arise slowly                   Problem: Patient Education: Go to Patient Education Activity  Goal: Patient/Family Education  Outcome: Progressing Towards Goal     Problem: Breathing Pattern - Ineffective  Goal: *Absence of hypoxia  Outcome: Progressing Towards Goal  Goal: *Use of effective breathing techniques  Outcome: Progressing Towards Goal  Goal: *PALLIATIVE CARE:  Alleviation of Dyspnea  Outcome: Progressing Towards Goal     Problem: Patient Education: Go to Patient Education Activity  Goal: Patient/Family Education  Outcome: Progressing Towards Goal     Problem: Diabetes Self-Management  Goal: *Disease process and treatment process  Description: Define diabetes and identify own type of diabetes; list 3 options for treating diabetes. Outcome: Progressing Towards Goal  Goal: *Incorporating nutritional management into lifestyle  Description: Describe effect of type, amount and timing of food on blood glucose; list 3 methods for planning meals. Outcome: Progressing Towards Goal  Goal: *Incorporating physical activity into lifestyle  Description: State effect of exercise on blood glucose levels. Outcome: Progressing Towards Goal  Goal: *Developing strategies to promote health/change behavior  Description: Define the ABC's of diabetes; identify appropriate screenings, schedule and personal plan for screenings. Outcome: Progressing Towards Goal  Goal: *Using medications safely  Description: State effect of diabetes medications on diabetes; name diabetes medication taking, action and side effects.   Outcome: Progressing Towards Goal  Goal: *Monitoring blood glucose, interpreting and using results  Description: Identify recommended blood glucose targets  and personal targets. Outcome: Progressing Towards Goal  Goal: *Prevention, detection, treatment of acute complications  Description: List symptoms of hyper- and hypoglycemia; describe how to treat low blood sugar and actions for lowering  high blood glucose level. Outcome: Progressing Towards Goal  Goal: *Prevention, detection and treatment of chronic complications  Description: Define the natural course of diabetes and describe the relationship of blood glucose levels to long term complications of diabetes.   Outcome: Progressing Towards Goal  Goal: *Developing strategies to address psychosocial issues  Description: Describe feelings about living with diabetes; identify support needed and support network  Outcome: Progressing Towards Goal  Goal: *Insulin pump training  Outcome: Progressing Towards Goal  Goal: *Sick day guidelines  Outcome: Progressing Towards Goal  Goal: *Patient Specific Goal (EDIT GOAL, INSERT TEXT)  Outcome: Progressing Towards Goal     Problem: Patient Education: Go to Patient Education Activity  Goal: Patient/Family Education  Outcome: Progressing Towards Goal

## 2021-11-25 NOTE — PROGRESS NOTES
Bedside and Verbal shift change report given to Raudel Steward RN (oncoming nurse) by Daniela Parisi RN (offgoing nurse). Report included the following information SBAR, Kardex, MAR, Recent Results and Cardiac Rhythm .

## 2021-11-26 VITALS
OXYGEN SATURATION: 98 % | TEMPERATURE: 97.8 F | SYSTOLIC BLOOD PRESSURE: 142 MMHG | WEIGHT: 215 LBS | HEART RATE: 65 BPM | HEIGHT: 73 IN | DIASTOLIC BLOOD PRESSURE: 88 MMHG | RESPIRATION RATE: 16 BRPM | BODY MASS INDEX: 28.49 KG/M2

## 2021-11-26 LAB
ANION GAP SERPL CALC-SCNC: 5 MMOL/L (ref 3–18)
BUN SERPL-MCNC: 20 MG/DL (ref 7–18)
BUN/CREAT SERPL: 21 (ref 12–20)
CALCIUM SERPL-MCNC: 8.4 MG/DL (ref 8.5–10.1)
CHLORIDE SERPL-SCNC: 108 MMOL/L (ref 100–111)
CO2 SERPL-SCNC: 28 MMOL/L (ref 21–32)
CREAT SERPL-MCNC: 0.96 MG/DL (ref 0.6–1.3)
ERYTHROCYTE [DISTWIDTH] IN BLOOD BY AUTOMATED COUNT: 14.6 % (ref 11.6–14.5)
GLUCOSE BLD STRIP.AUTO-MCNC: 204 MG/DL (ref 70–110)
GLUCOSE BLD STRIP.AUTO-MCNC: 338 MG/DL (ref 70–110)
GLUCOSE SERPL-MCNC: 181 MG/DL (ref 74–99)
HCT VFR BLD AUTO: 40.4 % (ref 36–48)
HGB BLD-MCNC: 12.9 G/DL (ref 13–16)
MCH RBC QN AUTO: 29.9 PG (ref 24–34)
MCHC RBC AUTO-ENTMCNC: 31.9 G/DL (ref 31–37)
MCV RBC AUTO: 93.7 FL (ref 78–100)
NRBC # BLD: 0 K/UL (ref 0–0.01)
NRBC BLD-RTO: 0 PER 100 WBC
PLATELET # BLD AUTO: 188 K/UL (ref 135–420)
PMV BLD AUTO: 9.5 FL (ref 9.2–11.8)
POTASSIUM SERPL-SCNC: 4.1 MMOL/L (ref 3.5–5.5)
RBC # BLD AUTO: 4.31 M/UL (ref 4.35–5.65)
SODIUM SERPL-SCNC: 141 MMOL/L (ref 136–145)
WBC # BLD AUTO: 10.3 K/UL (ref 4.6–13.2)

## 2021-11-26 PROCEDURE — 36415 COLL VENOUS BLD VENIPUNCTURE: CPT

## 2021-11-26 PROCEDURE — 74011000250 HC RX REV CODE- 250: Performed by: HOSPITALIST

## 2021-11-26 PROCEDURE — 74011250636 HC RX REV CODE- 250/636: Performed by: HOSPITALIST

## 2021-11-26 PROCEDURE — 80048 BASIC METABOLIC PNL TOTAL CA: CPT

## 2021-11-26 PROCEDURE — 85027 COMPLETE CBC AUTOMATED: CPT

## 2021-11-26 PROCEDURE — 94640 AIRWAY INHALATION TREATMENT: CPT

## 2021-11-26 PROCEDURE — 74011250636 HC RX REV CODE- 250/636: Performed by: FAMILY MEDICINE

## 2021-11-26 PROCEDURE — 74011636637 HC RX REV CODE- 636/637: Performed by: HOSPITALIST

## 2021-11-26 PROCEDURE — 82962 GLUCOSE BLOOD TEST: CPT

## 2021-11-26 PROCEDURE — 74011250637 HC RX REV CODE- 250/637: Performed by: HOSPITALIST

## 2021-11-26 RX ORDER — AZITHROMYCIN 500 MG/1
500 TABLET, FILM COATED ORAL DAILY
Qty: 3 TABLET | Refills: 0 | Status: SHIPPED | OUTPATIENT
Start: 2021-11-26 | End: 2021-12-12

## 2021-11-26 RX ORDER — PREDNISONE 10 MG/1
TABLET ORAL
Qty: 42 TABLET | Refills: 0 | Status: SHIPPED | OUTPATIENT
Start: 2021-11-26 | End: 2021-12-12

## 2021-11-26 RX ADMIN — PANTOPRAZOLE SODIUM 40 MG: 40 TABLET, DELAYED RELEASE ORAL at 06:17

## 2021-11-26 RX ADMIN — METHYLPREDNISOLONE SODIUM SUCCINATE 60 MG: 40 INJECTION, POWDER, FOR SOLUTION INTRAMUSCULAR; INTRAVENOUS at 12:29

## 2021-11-26 RX ADMIN — METHYLPREDNISOLONE SODIUM SUCCINATE 60 MG: 40 INJECTION, POWDER, FOR SOLUTION INTRAMUSCULAR; INTRAVENOUS at 00:51

## 2021-11-26 RX ADMIN — METHYLPREDNISOLONE SODIUM SUCCINATE 60 MG: 40 INJECTION, POWDER, FOR SOLUTION INTRAMUSCULAR; INTRAVENOUS at 06:17

## 2021-11-26 RX ADMIN — IPRATROPIUM BROMIDE AND ALBUTEROL SULFATE 3 ML: .5; 3 SOLUTION RESPIRATORY (INHALATION) at 07:50

## 2021-11-26 RX ADMIN — ARIPIPRAZOLE 5 MG: 5 TABLET ORAL at 09:39

## 2021-11-26 RX ADMIN — DULOXETINE HYDROCHLORIDE 20 MG: 20 CAPSULE, DELAYED RELEASE ORAL at 09:40

## 2021-11-26 RX ADMIN — WATER 1 G: 1 INJECTION INTRAMUSCULAR; INTRAVENOUS; SUBCUTANEOUS at 12:29

## 2021-11-26 RX ADMIN — ARFORMOTEROL TARTRATE 15 MCG: 15 SOLUTION RESPIRATORY (INHALATION) at 07:50

## 2021-11-26 RX ADMIN — INSULIN LISPRO 6 UNITS: 100 INJECTION, SOLUTION INTRAVENOUS; SUBCUTANEOUS at 06:17

## 2021-11-26 RX ADMIN — INSULIN LISPRO 12 UNITS: 100 INJECTION, SOLUTION INTRAVENOUS; SUBCUTANEOUS at 12:30

## 2021-11-26 RX ADMIN — DABIGATRAN ETEXILATE MESYLATE 150 MG: 75 CAPSULE ORAL at 12:27

## 2021-11-26 RX ADMIN — BUDESONIDE 500 MCG: 0.5 INHALANT RESPIRATORY (INHALATION) at 07:49

## 2021-11-26 NOTE — PROGRESS NOTES
DC Plan: home with MD follow up    Patient has tapered to room air and is stable for discharge with follow up with MD.    Care Management Interventions  Transition of Care Consult (CM Consult): Discharge Planning  Support Systems: Other Family Member(s)  Confirm Follow Up Transport: Family

## 2021-11-26 NOTE — PROGRESS NOTES
Problem: Falls - Risk of  Goal: *Absence of Falls  Description: Document Aurora Carrizales Fall Risk and appropriate interventions in the flowsheet. 11/26/2021 1434 by Angélica Fisher RN  Outcome: Resolved/Met  Variance Patient Condition  Note: Fall Risk Interventions:            Medication Interventions: Teach patient to arise slowly                11/26/2021 1421 by Angélica Fisher RN  Outcome: Progressing Towards Goal  Variance Patient Condition  Note: Fall Risk Interventions:            Medication Interventions: Teach patient to arise slowly                   Problem: Patient Education: Go to Patient Education Activity  Goal: Patient/Family Education  11/26/2021 1434 by Angélica Fisher RN  Outcome: Resolved/Met  11/26/2021 1421 by Angélica Fisher RN  Outcome: Progressing Towards Goal     Problem: Breathing Pattern - Ineffective  Goal: *Absence of hypoxia  11/26/2021 1434 by Angélica Fisher RN  Outcome: Resolved/Met  11/26/2021 1421 by Angélica Fisher RN  Outcome: Progressing Towards Goal  Goal: *Use of effective breathing techniques  11/26/2021 1434 by Angélica Fisher RN  Outcome: Resolved/Met  11/26/2021 1421 by Angélica Fisher RN  Outcome: Progressing Towards Goal  Goal: *PALLIATIVE CARE:  Alleviation of Dyspnea  11/26/2021 1434 by Angélica Fisher RN  Outcome: Resolved/Met  11/26/2021 1421 by Angélica Fisher RN  Outcome: Progressing Towards Goal     Problem: Patient Education: Go to Patient Education Activity  Goal: Patient/Family Education  11/26/2021 1434 by Angélica Fisher RN  Outcome: Resolved/Met  11/26/2021 1421 by Angélica Fisher RN  Outcome: Progressing Towards Goal     Problem: Diabetes Self-Management  Goal: *Disease process and treatment process  Description: Define diabetes and identify own type of diabetes; list 3 options for treating diabetes.   11/26/2021 1434 by Angélica Fisher RN  Outcome: Resolved/Met  11/26/2021 1421 by Angélica Fisher RN  Outcome: Progressing Towards Goal  Goal: *Incorporating nutritional management into lifestyle  Description: Describe effect of type, amount and timing of food on blood glucose; list 3 methods for planning meals. 11/26/2021 1434 by Kasey Loyola RN  Outcome: Resolved/Met  11/26/2021 1421 by Kasey Loyola RN  Outcome: Progressing Towards Goal  Goal: *Incorporating physical activity into lifestyle  Description: State effect of exercise on blood glucose levels. 11/26/2021 1434 by Kasey Loyola RN  Outcome: Resolved/Met  11/26/2021 1421 by Kasey Loyola RN  Outcome: Progressing Towards Goal  Goal: *Developing strategies to promote health/change behavior  Description: Define the ABC's of diabetes; identify appropriate screenings, schedule and personal plan for screenings. 11/26/2021 1434 by Kasey Loyola RN  Outcome: Resolved/Met  11/26/2021 1421 by Kasey Loyola RN  Outcome: Progressing Towards Goal  Goal: *Using medications safely  Description: State effect of diabetes medications on diabetes; name diabetes medication taking, action and side effects. 11/26/2021 1434 by Kasey Loyola RN  Outcome: Resolved/Met  11/26/2021 1421 by Kasey Loyola RN  Outcome: Progressing Towards Goal  Goal: *Monitoring blood glucose, interpreting and using results  Description: Identify recommended blood glucose targets  and personal targets. 11/26/2021 1434 by Kasey Loyola RN  Outcome: Resolved/Met  11/26/2021 1421 by Kasey Loyola RN  Outcome: Progressing Towards Goal  Goal: *Prevention, detection, treatment of acute complications  Description: List symptoms of hyper- and hypoglycemia; describe how to treat low blood sugar and actions for lowering  high blood glucose level.   11/26/2021 1434 by Kasey Loyola RN  Outcome: Resolved/Met  11/26/2021 1421 by Kasey Loyola RN  Outcome: Progressing Towards Goal  Goal: *Prevention, detection and treatment of chronic complications  Description: Define the natural course of diabetes and describe the relationship of blood glucose levels to long term complications of diabetes.   11/26/2021 1434 by Victoriano Duke RN  Outcome: Resolved/Met  11/26/2021 1421 by Victoriano Duke RN  Outcome: Progressing Towards Goal  Goal: *Developing strategies to address psychosocial issues  Description: Describe feelings about living with diabetes; identify support needed and support network  11/26/2021 1434 by Victoriano Duke RN  Outcome: Resolved/Met  11/26/2021 1421 by Victoriano Duke RN  Outcome: Progressing Towards Goal  Goal: *Insulin pump training  11/26/2021 1434 by Victoriano Duke RN  Outcome: Resolved/Met  11/26/2021 1421 by Victoriano Duke RN  Outcome: Progressing Towards Goal  Goal: *Sick day guidelines  11/26/2021 1434 by Victoriano Duke RN  Outcome: Resolved/Met  11/26/2021 1421 by Victoriano Duke RN  Outcome: Progressing Towards Goal  Goal: *Patient Specific Goal (EDIT GOAL, INSERT TEXT)  11/26/2021 1434 by Victoriano Duke RN  Outcome: Resolved/Met  11/26/2021 1421 by Victoriano Duke RN  Outcome: Progressing Towards Goal     Problem: Patient Education: Go to Patient Education Activity  Goal: Patient/Family Education  11/26/2021 1434 by Victoriano Duke RN  Outcome: Resolved/Met  11/26/2021 1421 by Victoriano Duke RN  Outcome: Progressing Towards Goal

## 2021-11-26 NOTE — ROUTINE PROCESS
0700  Received bedside report from Kavon Plummer RN. Report included KARDEX, SBAR, med rec, and labs.

## 2021-11-26 NOTE — PROGRESS NOTES
Problem: Falls - Risk of  Goal: *Absence of Falls  Description: Document Mirian Escudero Fall Risk and appropriate interventions in the flowsheet. Outcome: Progressing Towards Goal  Note: Fall Risk Interventions:            Medication Interventions: Teach patient to arise slowly                   Problem: Patient Education: Go to Patient Education Activity  Goal: Patient/Family Education  Outcome: Progressing Towards Goal     Problem: Breathing Pattern - Ineffective  Goal: *Absence of hypoxia  Outcome: Progressing Towards Goal  Goal: *Use of effective breathing techniques  Outcome: Progressing Towards Goal  Goal: *PALLIATIVE CARE:  Alleviation of Dyspnea  Outcome: Progressing Towards Goal     Problem: Patient Education: Go to Patient Education Activity  Goal: Patient/Family Education  Outcome: Progressing Towards Goal     Problem: Diabetes Self-Management  Goal: *Disease process and treatment process  Description: Define diabetes and identify own type of diabetes; list 3 options for treating diabetes. Outcome: Progressing Towards Goal  Goal: *Incorporating nutritional management into lifestyle  Description: Describe effect of type, amount and timing of food on blood glucose; list 3 methods for planning meals. Outcome: Progressing Towards Goal  Goal: *Incorporating physical activity into lifestyle  Description: State effect of exercise on blood glucose levels. Outcome: Progressing Towards Goal  Goal: *Developing strategies to promote health/change behavior  Description: Define the ABC's of diabetes; identify appropriate screenings, schedule and personal plan for screenings. Outcome: Progressing Towards Goal  Goal: *Using medications safely  Description: State effect of diabetes medications on diabetes; name diabetes medication taking, action and side effects.   Outcome: Progressing Towards Goal  Goal: *Monitoring blood glucose, interpreting and using results  Description: Identify recommended blood glucose targets  and personal targets. Outcome: Progressing Towards Goal  Goal: *Prevention, detection, treatment of acute complications  Description: List symptoms of hyper- and hypoglycemia; describe how to treat low blood sugar and actions for lowering  high blood glucose level. Outcome: Progressing Towards Goal  Goal: *Prevention, detection and treatment of chronic complications  Description: Define the natural course of diabetes and describe the relationship of blood glucose levels to long term complications of diabetes.   Outcome: Progressing Towards Goal  Goal: *Developing strategies to address psychosocial issues  Description: Describe feelings about living with diabetes; identify support needed and support network  Outcome: Progressing Towards Goal  Goal: *Insulin pump training  Outcome: Progressing Towards Goal  Goal: *Sick day guidelines  Outcome: Progressing Towards Goal  Goal: *Patient Specific Goal (EDIT GOAL, INSERT TEXT)  Outcome: Progressing Towards Goal     Problem: Patient Education: Go to Patient Education Activity  Goal: Patient/Family Education  Outcome: Progressing Towards Goal

## 2021-11-26 NOTE — DISCHARGE SUMMARY
Discharge Summary    Patient: Almas Jose MRN: [id number]  CSN: [id number]    YOB: 1950  Age: 70 y.o.   Sex: male    DOA: 11/23/2021 LOS:  LOS: 3 days   Discharge Date:      Primary Care Provider:  Other, MD Keisha    Admission Diagnoses: Respiratory failure [facility]) [J96.90]    Discharge Diagnoses:    Problem List as of 11/26/2021 Date Reviewed: 12/14/2015          Codes Class Noted - Resolved    * (Principal) Acute respiratory failure with hypoxia ([facility] ICD-10-CM: J96.01  ICD-9-CM: 518.81  11/23/2021 - Present        Pneumonia ICD-10-CM: J18.9  ICD-9-CM: 486  11/23/2021 - Present        AAA (abdominal aortic aneurysm) ([facility] ICD-10-CM: I71.4  ICD-9-CM: 441.4  6/8/2018 - Present        Vitamin D deficiency disease ICD-10-CM: E55.9  ICD-9-CM: 268.9  2/9/2017 - Present        Asthma ICD-10-CM: J45.909  ICD-9-CM: 493.90  Unknown - Present        Chronic deep vein thrombosis (DVT) ([facility] ICD-10-CM: I82.509  ICD-9-CM: 453.50  Unknown - Present        Obstructive sleep apnea ICD-10-CM: G47.33  ICD-9-CM: 327.23  Unknown - Present        Major depression ICD-10-CM: F32.9  ICD-9-CM: 296.20  Unknown - Present        Hypertension ICD-10-CM: I10  ICD-9-CM: 401.9  Unknown - Present        Anxiety ICD-10-CM: F41.9  ICD-9-CM: 300.00  Unknown - Present        Mild diastolic dysfunction LAKESHA-94-IQ: I51.9  ICD-9-CM: 429.9  Unknown - Present        COPD (chronic obstructive pulmonary disease) ([facility] (Chronic) ICD-10-CM: J44.9  ICD-9-CM: 073  11/22/2016 - Present    Overview Addendum 6/25/2018  3:56 PM by Kitty Chapin     Overview:   5/2013: FVC 40%, FEV1 38%,m ratio 74%, +BD response, +air trapping  Prev required chronic prednisone 10mg daily  Overview:   5/2013: FVC 40%, FEV1 38%,m ratio 74%, +BD response, +air trapping  Prev required chronic prednisone 10mg daily             COPD exacerbation ([facility] ICD-10-CM: J44.1  ICD-9-CM: 491.21  11/22/2016 - Present        Gastroesophageal reflux disease without esophagitis ICD-10-CM: K21.9  ICD-9-CM: 530.81  7/6/2016 - Present        Problem related to housing and economic circumstances ICD-10-CM: Z59.9  ICD-9-CM: V60.9  2/14/2016 - Present        Cocaine abuse ([facility] (Chronic) ICD-10-CM: F14.10  ICD-9-CM: 305.60  12/13/2015 - Present        Alcohol abuse (Chronic) ICD-10-CM: F10.10  ICD-9-CM: 305.00  12/13/2015 - Present        Dyslipidemia ICD-10-CM: E78.5  ICD-9-CM: 272.4  1/6/2015 - Present        RESOLVED: Depression ICD-10-CM: F32. A  ICD-9-CM: 683  8/3/2021 - 8/3/2021        RESOLVED: Hyperlipidemia ICD-10-CM: E78.5  ICD-9-CM: 272.4  8/3/2021 - 8/3/2021        RESOLVED: GERD (gastroesophageal reflux disease) ICD-10-CM: K21.9  ICD-9-CM: 530.81  8/3/2021 - 8/3/2021        RESOLVED: Essential hypertension ICD-10-CM: I10  ICD-9-CM: 401.9  2/9/2017 - 8/3/2021        RESOLVED: Major depression, recurrent ([facility] ICD-10-CM: F33.9  ICD-9-CM: 296.30  12/13/2015 - 11/22/2016        RESOLVED: Cocaine abuse with cocaine-induced mood disorder ([facility] ICD-10-CM: F14.14  ICD-9-CM: 292.84  12/13/2015 - 11/22/2016        RESOLVED: Bronchitis ICD-10-CM: J40  ICD-9-CM: 068  Unknown - 11/22/2016              Discharge Medications:     Current Discharge Medication List      START taking these medications    Details   predniSONE (STERAPRED DS) 10 mg dose pack Take 6 tablets by mouth daily for 2 days, then take 5 tablets by mouth daily for 2 days then take 4 tablets by mouth daily for 2 days, then take 3 tablets by mouth daily for 2 days, then take 2 tablets by mouth daily for 2 days, then take 1 tablet by mouth daily for 2 days  Qty: 42 Tablet, Refills: 0  Start date: 11/26/2021         CONTINUE these medications which have CHANGED    Details   azithromycin (ZITHROMAX) 500 mg tab Take 1 Tablet by mouth daily.   Qty: 3 Tablet, Refills: 0  Start date: 11/26/2021         CONTINUE these medications which have NOT CHANGED    Details   DULoxetine (CYMBALTA) 20 mg capsule 20 mg.      therapeutic multivitamin-minerals (THERAGRAN-M) tablet Take 1 Tab by Mouth Once a Day. traMADol (ULTRAM) 50 mg tablet 50 mg. ARIPiprazole (ABILIFY) 5 mg tablet Take 1 Tab by mouth daily. Indications: DEPRESSION TREATMENT ADJUNCT  Qty: 30 Tab, Refills: 1      !! dabigatran etexilate (PRADAXA) 150 mg capsule Take 1 Cap by mouth every twelve (12) hours. Indications: PREVENT THROMBOEMBOLISM IN CHRONIC ATRIAL FIBRILLATION, PREVENTION OF DEEP VEIN THROMBOSIS RECURRENCE  Qty: 60 Cap, Refills: 1      albuterol (PROVENTIL HFA, VENTOLIN HFA, PROAIR HFA) 90 mcg/actuation inhaler Take 2 Puffs by inhalation every four (4) hours as needed for Wheezing or Shortness of Breath (Cough). Qty: 1 Inhaler, Refills: 0      inhalational spacing device ALWAYS USE WITH INHALER  Qty: 1 Device, Refills: 0      SPIRIVA WITH HANDIHALER 18 mcg inhalation capsule Take 1 Cap by inhalation daily. 2/1/17 Dr. Houston Valdovinos      omeprazole (PRILOSEC) 20 mg capsule Take 1 Cap by mouth two (2) times a day. 2/1/17, QTY#60, 30 Days. 2/1/17 Dr. Rubio Bogdanya 100 mg capsule Take 1 Cap by mouth daily. 2/1/17, QTY#100 Caps, 30 Days. Dr. Houston Valdovinos      budesonide-formoterol [facility]) 160-4.5 mcg/actuation HFA inhaler Take 2 Puffs by inhalation two (2) times a day. !! dabigatran etexilate (PRADAXA) 150 mg capsule Take 150 mg by mouth every twelve (12) hours. albuterol-ipratropium (DUONEB) 2.5 mg-0.5 mg/3 ml nebu 3 mL by Nebulization route every six (6) hours as needed. !! - Potential duplicate medications found. Please discuss with provider. Discharge Condition: improved     Procedures : none     Consults: none    PHYSICAL EXAM   Visit Vitals  BP (!) 142/88   Pulse 65   Temp 97.8 °F (36.6 °C)   Resp 16   Ht 6' 1\" (1.854 m)   Wt 97.5 kg (215 lb)   SpO2 98%   BMI 28.37 kg/m²     General: Awake, cooperative, no acute distress    HEENT: NC, Atraumatic. PERRLA, EOMI. Anicteric sclerae. Lungs:  CTA Bilaterally.  No Wheezing/Rhonchi/Rales. Heart:  Regular  rhythm,  No murmur, No Rubs, No Gallops  Abdomen: Soft, Non distended, Non tender. +Bowel sounds,   Extremities: No c/c/e  Psych:   Not anxious or agitated. Neurologic:  No acute neurological deficits. Admission HPI : Carlie Tran is a 70 y.o. male with COPD/asthma, chronic DVT, cocaine use, hypertension, depression, NEIL on CPAP presents to ER with concerns of shortness of breath. Patient reports that he has been having shortness of breath for the past 1 month. He was seen in ER at [address] [place] [address] and at this hospital twice in the past 1 month. He reports that over the past few days his shortness of breath has been getting worse. He has been using his nebulizer and inhaler with no improvement. This morning his shortness of breath worse and he called EMS. He was given Solu-Medrol and neb treatment. He was noted to be hypoxic. He was placed on nonrebreather by EMS. Other symptoms include cough that is productive. He reports that he has sleep apnea and he has CPAP machine at home but he has not been using it because it requires cleaning. He is waiting for a new machine. Received 2 doses of COVID 19 vaccine, did not get booster yet. In ER he required to be placed on high flow oxygen. His respirations 28, cardiac enzymes in normal range, chest x-ray showed patchy right upper lung parenchymal infiltrates.     Hospital Course :   COPD exacerbation -  Did well with IV steroids, prn duo-nebs, pulmicort and brovana  Will DC home to continue home controller and prn inhalers   Will DC on prednisone taper      Pneumonia  Received IV rocephin and azithromycin   Will DC home on PO azithromycin      Acute respiratory failure with hypoxia -  Weaned to room air and holding sats >98%     Chronic DVT -  Is on pradaxa as prescribed by [facility].     NEIL -  Advised pt to use CPAP      HTN -  Controlled  Monitor BP     GERD -  On PPI    Activity: as tolerated    Diet: regular    Follow-up: with PCP in one week    Disposition: Home     Minutes spent on discharge: 45 minutes      Labs: Results:       Chemistry Recent Labs     11/26/21  0508 11/25/21  0200 11/24/21  0350   * 196* 257*    141 140   K 4.1 4.4 4.6    110 108   CO2 28 27 26   BUN 20* 18 15   CREA 0.96 1.02 1.11   CA 8.4* 8.4* 8.6   AGAP 5 4 6   BUCR 21* 18 14      CBC w/Diff Recent Labs     11/26/21  0508 11/25/21  0200 11/24/21  0350   WBC 10.3 9.9 8.0   RBC 4.31* 4.20* 4.23*   HGB 12.9* 12.3* 12.7*   HCT 40.4 39.2 40.2    196 181      Cardiac Enzymes No results for input(s): CPK, CKND1, RENU in the last 72 hours. No lab exists for component: CKRMB, TROIP   Coagulation No results for input(s): PTP, INR, APTT, INREXT, INREXT in the last 72 hours. Lipid Panel Lab Results   Component Value Date/Time    Cholesterol, total 188 04/03/2011 04:30 AM    HDL Cholesterol 89 (H) 04/03/2011 04:30 AM    LDL, calculated 87 04/03/2011 04:30 AM    VLDL, calculated 12 04/03/2011 04:30 AM    Triglyceride 60 04/03/2011 04:30 AM    CHOL/HDL Ratio 2.1 04/03/2011 04:30 AM      BNP No results for input(s): BNPP in the last 72 hours. Liver Enzymes No results for input(s): TP, ALB, TBIL, AP in the last 72 hours. No lab exists for component: SGOT, GPT, DBIL   Thyroid Studies No results found for: T4, T3U, TSH, TSHEXT, TSHEXT         Significant Diagnostic Studies: XR CHEST PORT    Result Date: 11/23/2021  EXAM: XR CHEST PORT CLINICAL INDICATION/HISTORY: chest pain, sob, and/or arrhythmia -Additional: None COMPARISON: 11/2/2021 TECHNIQUE: Portable frontal view of the chest _______________ FINDINGS: SUPPORT DEVICES: None. HEART AND MEDIASTINUM: Cardiomediastinal silhouette within normal limits. LUNGS AND PLEURAL SPACES: Patchy right upper lung parenchymal infiltrates. No large effusion or pneumothorax. _______________     Patchy right upper lung parenchymal infiltrates.      XR CHEST PORT    Result Date: 11/2/2021  EXAM: XR CHEST PORT CLINICAL INDICATION/HISTORY: sob -Additional: None COMPARISON: 02/09/2017, 11/22/2016 TECHNIQUE: Frontal view of the chest _______________ FINDINGS: HEART AND MEDIASTINUM: Stable midline cardiac flow without appreciable cardiomegaly. Stable tortuous thoracic aorta. LUNGS AND PLEURAL SPACES: Chronic hyperinflation and interstitial scarring with patchy peripheral and right minor fissural parenchymal opacities. No focal left lung opacity. No pneumothorax or effusion. BONY THORAX AND SOFT TISSUES: No acute or destructive osseous abnormality. _______________     1. Peripheral right upper lobe patchy parenchymal opacity, superimposed upon chronic underlying interstitial changes and hyperinflation. Diagnostic considerations would include pneumonia and/or atelectasis/scarring. No results found for this or any previous visit.         CC: Micah, MD Keisha

## 2021-11-26 NOTE — PROGRESS NOTES
Shift summary: the patient rested well with no new clinical concerns noted. He is tolerating room air well. Bedside and Verbal shift change report given to Leona Reyes RN (oncoming nurse) by Scot Boyce RN (offgoing nurse).  Report included the following information SBAR, Kardex, Intake/Output, MAR, Recent Results, and Cardiac Rhythm: SR.

## 2021-11-26 NOTE — PROGRESS NOTES
Problem: Falls - Risk of  Goal: *Absence of Falls  Description: Document Iglesia Daniels Fall Risk and appropriate interventions in the flowsheet. Outcome: Progressing Towards Goal  Variance Patient Condition  Note: Fall Risk Interventions:            Medication Interventions: Teach patient to arise slowly                   Problem: Patient Education: Go to Patient Education Activity  Goal: Patient/Family Education  Outcome: Progressing Towards Goal     Problem: Breathing Pattern - Ineffective  Goal: *Absence of hypoxia  Outcome: Progressing Towards Goal  Goal: *Use of effective breathing techniques  Outcome: Progressing Towards Goal  Goal: *PALLIATIVE CARE:  Alleviation of Dyspnea  Outcome: Progressing Towards Goal     Problem: Patient Education: Go to Patient Education Activity  Goal: Patient/Family Education  Outcome: Progressing Towards Goal     Problem: Diabetes Self-Management  Goal: *Disease process and treatment process  Description: Define diabetes and identify own type of diabetes; list 3 options for treating diabetes. Outcome: Progressing Towards Goal  Goal: *Incorporating nutritional management into lifestyle  Description: Describe effect of type, amount and timing of food on blood glucose; list 3 methods for planning meals. Outcome: Progressing Towards Goal  Goal: *Incorporating physical activity into lifestyle  Description: State effect of exercise on blood glucose levels. Outcome: Progressing Towards Goal  Goal: *Developing strategies to promote health/change behavior  Description: Define the ABC's of diabetes; identify appropriate screenings, schedule and personal plan for screenings. Outcome: Progressing Towards Goal  Goal: *Using medications safely  Description: State effect of diabetes medications on diabetes; name diabetes medication taking, action and side effects.   Outcome: Progressing Towards Goal  Goal: *Monitoring blood glucose, interpreting and using results  Description: Identify recommended blood glucose targets  and personal targets. Outcome: Progressing Towards Goal  Goal: *Prevention, detection, treatment of acute complications  Description: List symptoms of hyper- and hypoglycemia; describe how to treat low blood sugar and actions for lowering  high blood glucose level. Outcome: Progressing Towards Goal  Goal: *Prevention, detection and treatment of chronic complications  Description: Define the natural course of diabetes and describe the relationship of blood glucose levels to long term complications of diabetes.   Outcome: Progressing Towards Goal  Goal: *Developing strategies to address psychosocial issues  Description: Describe feelings about living with diabetes; identify support needed and support network  Outcome: Progressing Towards Goal  Goal: *Insulin pump training  Outcome: Progressing Towards Goal  Goal: *Sick day guidelines  Outcome: Progressing Towards Goal  Goal: *Patient Specific Goal (EDIT GOAL, INSERT TEXT)  Outcome: Progressing Towards Goal     Problem: Patient Education: Go to Patient Education Activity  Goal: Patient/Family Education  Outcome: Progressing Towards Goal

## 2021-12-07 ENCOUNTER — HOSPITAL ENCOUNTER (INPATIENT)
Age: 71
LOS: 5 days | Discharge: HOME | DRG: 190 | End: 2021-12-12
Attending: STUDENT IN AN ORGANIZED HEALTH CARE EDUCATION/TRAINING PROGRAM | Admitting: FAMILY MEDICINE
Payer: MEDICARE

## 2021-12-07 ENCOUNTER — APPOINTMENT (OUTPATIENT)
Dept: GENERAL RADIOLOGY | Age: 71
DRG: 190 | End: 2021-12-07
Attending: STUDENT IN AN ORGANIZED HEALTH CARE EDUCATION/TRAINING PROGRAM
Payer: MEDICARE

## 2021-12-07 DIAGNOSIS — F14.10 COCAINE ABUSE (HCC): ICD-10-CM

## 2021-12-07 DIAGNOSIS — J44.1 COPD WITH ACUTE EXACERBATION (HCC): ICD-10-CM

## 2021-12-07 DIAGNOSIS — R06.02 SOB (SHORTNESS OF BREATH): Primary | ICD-10-CM

## 2021-12-07 DIAGNOSIS — R06.02 SOBOE (SHORTNESS OF BREATH ON EXERTION): ICD-10-CM

## 2021-12-07 PROBLEM — J18.1 CONSOLIDATION OF RIGHT LOWER LOBE OF LUNG (HCC): Status: ACTIVE | Noted: 2021-12-07

## 2021-12-07 PROBLEM — Z91.14 NONCOMPLIANCE WITH CPAP TREATMENT: Status: ACTIVE | Noted: 2021-12-07

## 2021-12-07 LAB
ALBUMIN SERPL-MCNC: 2.8 G/DL (ref 3.4–5)
ALBUMIN/GLOB SERPL: 0.7 {RATIO} (ref 0.8–1.7)
ALP SERPL-CCNC: 78 U/L (ref 45–117)
ALT SERPL-CCNC: 22 U/L (ref 16–61)
ANION GAP SERPL CALC-SCNC: 5 MMOL/L (ref 3–18)
AST SERPL-CCNC: 13 U/L (ref 10–38)
ATRIAL RATE: 90 BPM
BASOPHILS # BLD: 0 K/UL (ref 0–0.1)
BASOPHILS NFR BLD: 0 % (ref 0–2)
BILIRUB SERPL-MCNC: 1.2 MG/DL (ref 0.2–1)
BUN SERPL-MCNC: 6 MG/DL (ref 7–18)
BUN/CREAT SERPL: 6 (ref 12–20)
CALCIUM SERPL-MCNC: 8.5 MG/DL (ref 8.5–10.1)
CALCULATED P AXIS, ECG09: 83 DEGREES
CALCULATED R AXIS, ECG10: -11 DEGREES
CALCULATED T AXIS, ECG11: 70 DEGREES
CHLORIDE SERPL-SCNC: 107 MMOL/L (ref 100–111)
CK MB CFR SERPL CALC: NORMAL % (ref 0–4)
CK MB SERPL-MCNC: <1 NG/ML (ref 5–25)
CK SERPL-CCNC: 96 U/L (ref 39–308)
CO2 SERPL-SCNC: 29 MMOL/L (ref 21–32)
CREAT SERPL-MCNC: 0.93 MG/DL (ref 0.6–1.3)
DIAGNOSIS, 93000: NORMAL
DIFFERENTIAL METHOD BLD: ABNORMAL
EOSINOPHIL # BLD: 0.6 K/UL (ref 0–0.4)
EOSINOPHIL NFR BLD: 7 % (ref 0–5)
ERYTHROCYTE [DISTWIDTH] IN BLOOD BY AUTOMATED COUNT: 16.1 % (ref 11.6–14.5)
GLOBULIN SER CALC-MCNC: 3.8 G/DL (ref 2–4)
GLUCOSE SERPL-MCNC: 92 MG/DL (ref 74–99)
HCT VFR BLD AUTO: 44.2 % (ref 36–48)
HGB BLD-MCNC: 13.6 G/DL (ref 13–16)
IMM GRANULOCYTES # BLD AUTO: 0 K/UL (ref 0–0.04)
IMM GRANULOCYTES NFR BLD AUTO: 0 % (ref 0–0.5)
LYMPHOCYTES # BLD: 1.8 K/UL (ref 0.9–3.6)
LYMPHOCYTES NFR BLD: 23 % (ref 21–52)
MCH RBC QN AUTO: 28.9 PG (ref 24–34)
MCHC RBC AUTO-ENTMCNC: 30.8 G/DL (ref 31–37)
MCV RBC AUTO: 93.8 FL (ref 78–100)
MONOCYTES # BLD: 0.6 K/UL (ref 0.05–1.2)
MONOCYTES NFR BLD: 7 % (ref 3–10)
NEUTS SEG # BLD: 4.9 K/UL (ref 1.8–8)
NEUTS SEG NFR BLD: 62 % (ref 40–73)
NRBC # BLD: 0 K/UL (ref 0–0.01)
NRBC BLD-RTO: 0 PER 100 WBC
P-R INTERVAL, ECG05: 136 MS
PLATELET # BLD AUTO: 167 K/UL (ref 135–420)
PMV BLD AUTO: 10 FL (ref 9.2–11.8)
POTASSIUM SERPL-SCNC: 3.8 MMOL/L (ref 3.5–5.5)
PROT SERPL-MCNC: 6.6 G/DL (ref 6.4–8.2)
Q-T INTERVAL, ECG07: 378 MS
QRS DURATION, ECG06: 86 MS
QTC CALCULATION (BEZET), ECG08: 462 MS
RBC # BLD AUTO: 4.71 M/UL (ref 4.35–5.65)
SODIUM SERPL-SCNC: 141 MMOL/L (ref 136–145)
TROPONIN I SERPL-MCNC: <0.02 NG/ML (ref 0–0.04)
VENTRICULAR RATE, ECG03: 90 BPM
WBC # BLD AUTO: 7.9 K/UL (ref 4.6–13.2)

## 2021-12-07 PROCEDURE — 74011250636 HC RX REV CODE- 250/636: Performed by: FAMILY MEDICINE

## 2021-12-07 PROCEDURE — 85025 COMPLETE CBC W/AUTO DIFF WBC: CPT

## 2021-12-07 PROCEDURE — 99285 EMERGENCY DEPT VISIT HI MDM: CPT

## 2021-12-07 PROCEDURE — 71045 X-RAY EXAM CHEST 1 VIEW: CPT

## 2021-12-07 PROCEDURE — 93005 ELECTROCARDIOGRAM TRACING: CPT

## 2021-12-07 PROCEDURE — 94640 AIRWAY INHALATION TREATMENT: CPT

## 2021-12-07 PROCEDURE — 65270000029 HC RM PRIVATE

## 2021-12-07 PROCEDURE — 74011000258 HC RX REV CODE- 258: Performed by: FAMILY MEDICINE

## 2021-12-07 PROCEDURE — 82553 CREATINE MB FRACTION: CPT

## 2021-12-07 PROCEDURE — 74011250636 HC RX REV CODE- 250/636: Performed by: STUDENT IN AN ORGANIZED HEALTH CARE EDUCATION/TRAINING PROGRAM

## 2021-12-07 PROCEDURE — 74011000250 HC RX REV CODE- 250: Performed by: STUDENT IN AN ORGANIZED HEALTH CARE EDUCATION/TRAINING PROGRAM

## 2021-12-07 PROCEDURE — 74011000250 HC RX REV CODE- 250: Performed by: FAMILY MEDICINE

## 2021-12-07 PROCEDURE — 96375 TX/PRO/DX INJ NEW DRUG ADDON: CPT

## 2021-12-07 PROCEDURE — 96366 THER/PROPH/DIAG IV INF ADDON: CPT

## 2021-12-07 PROCEDURE — 80053 COMPREHEN METABOLIC PANEL: CPT

## 2021-12-07 PROCEDURE — 96365 THER/PROPH/DIAG IV INF INIT: CPT

## 2021-12-07 RX ORDER — BISACODYL 5 MG
5 TABLET, DELAYED RELEASE (ENTERIC COATED) ORAL DAILY PRN
Status: DISCONTINUED | OUTPATIENT
Start: 2021-12-07 | End: 2021-12-12 | Stop reason: HOSPADM

## 2021-12-07 RX ORDER — IPRATROPIUM BROMIDE AND ALBUTEROL SULFATE 2.5; .5 MG/3ML; MG/3ML
3 SOLUTION RESPIRATORY (INHALATION)
Status: DISCONTINUED | OUTPATIENT
Start: 2021-12-07 | End: 2021-12-12 | Stop reason: HOSPADM

## 2021-12-07 RX ORDER — NALOXONE HYDROCHLORIDE 0.4 MG/ML
0.4 INJECTION, SOLUTION INTRAMUSCULAR; INTRAVENOUS; SUBCUTANEOUS AS NEEDED
Status: DISCONTINUED | OUTPATIENT
Start: 2021-12-07 | End: 2021-12-12 | Stop reason: HOSPADM

## 2021-12-07 RX ORDER — OXYCODONE HYDROCHLORIDE 5 MG/1
5 TABLET ORAL
Status: DISCONTINUED | OUTPATIENT
Start: 2021-12-07 | End: 2021-12-12 | Stop reason: HOSPADM

## 2021-12-07 RX ORDER — HEPARIN SODIUM 5000 [USP'U]/ML
5000 INJECTION, SOLUTION INTRAVENOUS; SUBCUTANEOUS EVERY 8 HOURS
Status: DISCONTINUED | OUTPATIENT
Start: 2021-12-07 | End: 2021-12-08

## 2021-12-07 RX ORDER — IPRATROPIUM BROMIDE AND ALBUTEROL SULFATE 2.5; .5 MG/3ML; MG/3ML
3 SOLUTION RESPIRATORY (INHALATION)
Status: COMPLETED | OUTPATIENT
Start: 2021-12-07 | End: 2021-12-07

## 2021-12-07 RX ORDER — SODIUM CHLORIDE 0.9 % (FLUSH) 0.9 %
5-40 SYRINGE (ML) INJECTION EVERY 8 HOURS
Status: DISCONTINUED | OUTPATIENT
Start: 2021-12-07 | End: 2021-12-12 | Stop reason: HOSPADM

## 2021-12-07 RX ORDER — BUDESONIDE 0.5 MG/2ML
500 INHALANT ORAL
Status: DISCONTINUED | OUTPATIENT
Start: 2021-12-07 | End: 2021-12-12 | Stop reason: HOSPADM

## 2021-12-07 RX ORDER — SODIUM CHLORIDE 0.9 % (FLUSH) 0.9 %
5-40 SYRINGE (ML) INJECTION AS NEEDED
Status: DISCONTINUED | OUTPATIENT
Start: 2021-12-07 | End: 2021-12-12 | Stop reason: HOSPADM

## 2021-12-07 RX ORDER — MAGNESIUM SULFATE HEPTAHYDRATE 40 MG/ML
2 INJECTION, SOLUTION INTRAVENOUS ONCE
Status: COMPLETED | OUTPATIENT
Start: 2021-12-07 | End: 2021-12-07

## 2021-12-07 RX ORDER — ONDANSETRON 2 MG/ML
4 INJECTION INTRAMUSCULAR; INTRAVENOUS
Status: DISCONTINUED | OUTPATIENT
Start: 2021-12-07 | End: 2021-12-12 | Stop reason: HOSPADM

## 2021-12-07 RX ORDER — ACETAMINOPHEN 325 MG/1
650 TABLET ORAL
Status: DISCONTINUED | OUTPATIENT
Start: 2021-12-07 | End: 2021-12-12 | Stop reason: HOSPADM

## 2021-12-07 RX ADMIN — IPRATROPIUM BROMIDE AND ALBUTEROL SULFATE 3 ML: .5; 3 SOLUTION RESPIRATORY (INHALATION) at 10:53

## 2021-12-07 RX ADMIN — IPRATROPIUM BROMIDE AND ALBUTEROL SULFATE 3 ML: .5; 3 SOLUTION RESPIRATORY (INHALATION) at 09:59

## 2021-12-07 RX ADMIN — IPRATROPIUM BROMIDE AND ALBUTEROL SULFATE 3 ML: .5; 3 SOLUTION RESPIRATORY (INHALATION) at 18:31

## 2021-12-07 RX ADMIN — IPRATROPIUM BROMIDE AND ALBUTEROL SULFATE 3 ML: .5; 3 SOLUTION RESPIRATORY (INHALATION) at 09:32

## 2021-12-07 RX ADMIN — HEPARIN SODIUM 5000 UNITS: 5000 INJECTION INTRAVENOUS; SUBCUTANEOUS at 17:07

## 2021-12-07 RX ADMIN — Medication 10 ML: at 23:14

## 2021-12-07 RX ADMIN — BUDESONIDE 500 MCG: 0.5 INHALANT RESPIRATORY (INHALATION) at 18:31

## 2021-12-07 RX ADMIN — MAGNESIUM SULFATE HEPTAHYDRATE 2 G: 40 INJECTION, SOLUTION INTRAVENOUS at 09:32

## 2021-12-07 RX ADMIN — METHYLPREDNISOLONE SODIUM SUCCINATE 125 MG: 125 INJECTION, POWDER, FOR SOLUTION INTRAMUSCULAR; INTRAVENOUS at 09:32

## 2021-12-07 RX ADMIN — DOXYCYCLINE 100 MG: 100 INJECTION, POWDER, LYOPHILIZED, FOR SOLUTION INTRAVENOUS at 21:58

## 2021-12-07 RX ADMIN — Medication 10 ML: at 16:14

## 2021-12-07 RX ADMIN — METHYLPREDNISOLONE SODIUM SUCCINATE 60 MG: 125 INJECTION, POWDER, FOR SOLUTION INTRAMUSCULAR; INTRAVENOUS at 17:06

## 2021-12-07 NOTE — Clinical Note
Status[de-identified] INPATIENT [101]   Type of Bed: Medical [8]   Cardiac Monitoring Required?: No   Inpatient Hospitalization Certified Necessary for the Following Reasons: 3.  Patient receiving treatment that can only be provided in an inpatient setting (further clarification in H&P documentation)   Admitting Diagnosis: COPD with acute exacerbation [facility] [[id number]]   Admitting Physician: Vahe Antonio [[id number]]   Attending Physician: Vahe Antonio [[id number]]   Estimated Length of Stay: 3-4 Midnights   Discharge Plan[de-identified] Home with Office Follow-up

## 2021-12-07 NOTE — ED NOTES
Pt arrives to the ED via EMS with report of SOB x 1 week. Pt reports worsening SOB this morning without relief from home nebulizer. Hx of COPD. Reports productive cough of clear sputum. Denies fevers. Pt sts he smoked crack 3 days ago however was feeling sob prior to drug use. Pt is tachypneic however is able to speak in clear and complete sentences. NAD at this time.

## 2021-12-07 NOTE — H&P
History & Physical    Patient: Lester Fritz MRN: [id number]  CSN: [id number]    YOB: 1950  Age: 70 y.o. Sex: male      DOA: 12/7/2021  Primary Care Provider:  Eliane Opitz, MD    Assessment/Plan   Lester Fritz is a 70 y.o. male   with a past medical history of COPD/asthma not on home oxygen, cocaine abuse, hypertension, depression, NEIL on CPAP and noncompliance to medical treatment presents to the emergency room for shortness of breath, chest tightness nonproductive dry cough and wheezing. Admitted for COPD exacerbation.      COPD exacerbation: Admit to MedSurg floor, Solu-Medrol IV, DuoNebs as needed, Pulmicort twice daily, head of bed elevated 30 degrees, incentive spirometer, good pulmonary toilet, O2 to keep oxygen saturations greater than 90%    Cocaine use/abuse: Advised permanent cessation    Hypertension: Nonsustained elevations in blood pressure, resume home medication    NEIL on CPAP: Nighttime CPAP while in hospital    Noncompliance to medical treatment: Encouraged compliance with entire medical plan, cessation of smoking cocaine    Right basilar consolidation: Persistent, doxycycline IV twice daily    Full code    DVT and GI prophylaxis ordered    Patient Active Problem List   Diagnosis Code    Cocaine abuse ([facility] F14.10    Alcohol abuse F10.10    COPD [facility]chronic obstructive pulmonary disease) ([facility] J44.9    COPD exacerbation ([facility] J44.1    Dyslipidemia E78.5    Gastroesophageal reflux disease without esophagitis K21.9    Problem related to housing and economic circumstances Z59.9    Vitamin D deficiency disease E55.9    Asthma J45.909    Chronic deep vein thrombosis (DVT) ([facility] I82.509    Obstructive sleep apnea G47.33    Major depression F32.9    Hypertension I10    Anxiety J67.9    Mild diastolic dysfunction P92.7    AAA (abdominal aortic aneurysm) ([facility]) I71.4    Acute respiratory failure with hypoxia ([facility]) J96.01    Pneumonia J18.9    COPD with acute exacerbation ([facility] J44.1    Noncompliance with CPAP treatment Z91.14    Consolidation of right lower lobe of lung (HCC) J18.1     Estimated length of stay :    CC: Shortness of breath with wheezing, chest tightness and nonproductive cough       HPI:     Rebecca Farrar is a 70 y.o. male   with a past medical history of COPD/asthma not on home oxygen, cocaine abuse, hypertension, depression, NEIL on CPAP but at last admission had issues with his CPAP and was not wearing it properly, noncompliance to medical treatment presents to the emergency room for shortness of breath, chest tightness nonproductive dry cough and wheezing that has been gradually worsening over the last 1 to 2 weeks. He states that he has been using his albuterol inhaler and his DuoNeb nebulizer at home without much improvement of his symptoms. When he arrived in the emergency room he just uses DuoNeb about an hour prior. Apparently he still had a prednisone taper and was still taking tapering doses of that medication. He was given Solu-Medrol, nebs and magnesium in the ED and attempt to try to wake him of his current exacerbation and sent him home however patient was unable to ambulate without significant respiratory distress. Can ambulate past 25 feet. As he tried to walk that distance in the emergency room he would have to come back and sit down. Patient feels like his prior COPD exacerbations. He has received 2 doses of COVID-19 vaccine. He endorsed to the ED team that he is continuing to use crack cocaine which he smokes and he used it approximately 3 to 7 days ago. He is otherwise not smoking. Denies fever chills or night sweats. Also denies nausea vomiting diarrhea or chest pain. He is afebrile and nonhypoxic in the emergency room. His chest x-ray shows a right basilar consolidation that is persistent as atelectasis versus pneumonia. His right upper lobe consolidation has improved since last x-ray.   I am being asked to admit this patient for COPD exacerbation. Past Medical History:   Diagnosis Date    Alcohol abuse     Anxiety     Asthma     Bronchitis     Chronic deep vein thrombosis (DVT) (HCC) 07/2013 07/2013, 12/2014 ([facility])    Coc[facility] ([facility]     COPD     Depression     GERD (gastroesophageal reflux disease)     Hyperlipidemia     Hypertension     Major depression     Mild diastolic dysfunction     NEIL on CPAP     Renal insufficiency     Suicidal ideation     Vitamin D deficiency        Past Surgical History:   Procedure Laterality Date    HX HIP REPLACEMENT  08/2010    HX OTHER SURGICAL  06/2018    ENDOVASCULAR ANEURYSM REPAIR       No family history on file. Social History     Socioeconomic History    Marital status: SINGLE   Tobacco Use    Smoking status: Former Smoker    Smokeless tobacco: Never Used    Tobacco comment: States that he quit 12 years ago. Substance and Sexual Activity    Alcohol use: Yes    Drug use: Yes     Types: Cocaine       Prior to Admission medications    Medication Sig Start Date End Date Taking? Authorizing Provider   azithromycin (ZITHROMAX) 500 mg tab Take 1 Tablet by mouth daily. 11/26/21   Carolina Moore MD   predniSONE (STERAPRED DS) 10 mg dose pack Take 6 tablets by mouth daily for 2 days, then take 5 tablets by mouth daily for 2 days then take 4 tablets by mouth daily for 2 days, then take 3 tablets by mouth daily for 2 days, then take 2 tablets by mouth daily for 2 days, then take 1 tablet by mouth daily for 2 days 11/26/21   Carolina Moore MD   DULoxetine (CYMBALTA) 20 mg capsule 20 mg.    Provider, Historical   therapeutic multivitamin-minerals (THERAGRAN-M) tablet Take 1 Tab by Mouth Once a Day. 6/16/18   Provider, Historical   traMADol (ULTRAM) 50 mg tablet 50 mg. 6/15/18   Provider, Historical   ARIPiprazole (ABILIFY) 5 mg tablet Take 1 Tab by mouth daily.  Indications: DEPRESSION TREATMENT ADJUNCT 2/21/17   Augusto Patel, MD   dabigatran etexilate (PRADAXA) 150 mg capsule Take 1 Cap by mouth every twelve (12) hours. Indications: PREVENT THROMBOEMBOLISM IN CHRONIC ATRIAL FIBRILLATION, PREVENTION OF DEEP VEIN THROMBOSIS RECURRENCE 2/21/17   Dioni Wyatt MD   albuterol (PROVENTIL HFA, VENTOLIN HFA, PROAIR HFA) 90 mcg/actuation inhaler Take 2 Puffs by inhalation every four (4) hours as needed for Wheezing or Shortness of Breath (Cough). 2/10/17   Evalee Dural T PA   inhalational spacing device ALWAYS USE WITH INHALER 2/10/17   Evalee Dural T, [place]   SPIRIVA WITH HANDIHALER 18 mcg inhalation capsule Take 1 Cap by inhalation daily. 2/1/17 Dr. Nishi Barnes 2/1/17   Other, MD Keisha   omeprazole (PRILOSEC) 20 mg capsule Take 1 Cap by mouth two (2) times a day. 2/1/17, QTY#60, 30 Days. 2/1/17 Dr. Nishi Barnes 2/1/17   Other, MD Keisha   STOOL SOFTENER 100 mg capsule Take 1 Cap by mouth daily. 2/1/17, QTY#100 Caps, 30 Days. Dr. Nishi Barnes 2/1/17   Other, MD Keisha   budesonide-formoterol [facility]) 160-4.5 mcg/actuation HFA inhaler Take 2 Puffs by inhalation two (2) times a day. Provider, Historical   dabigatran etexilate (PRADAXA) 150 mg capsule Take 150 mg by mouth every twelve (12) hours. Provider, Historical   albuterol-ipratropium (DUONEB) 2.5 mg-0.5 mg/3 ml nebu 3 mL by Nebulization route every six (6) hours as needed. Provider, Historical       Allergies   Allergen Reactions    Shellfish Derived Anaphylaxis     Review of Systems  Gen: No fever, chills, malaise, weight loss/gain. Heent: No headache, rhinorrhea, epistaxis, ear pain, hearing loss, sinus pain, neck pain/stiffness, sore throat. Heart: No chest pain, palpitations, WREN, pnd, or orthopnea. Resp: No cough, hemoptysis, +wheezing and shortness of breath. GI: No nausea, vomiting, diarrhea, constipation, melena or hematochezia. : No urinary obstruction, dysuria or hematuria. Derm: No rash, new skin lesion or pruritis. Musc/skeletal: no bone or joint complains.   Vasc: No edema, cyanosis or claudication. Endo: No heat/cold intolerance, no polyuria,polydipsia or polyphagia. Neuro: No unilateral weakness, numbness, tingling. No seizures. Heme: No easy bruising or bleeding. Physical Exam:     Physical Exam:  Visit Vitals  /70 (BP 1 Location: Left upper arm, BP Patient Position: At rest)   Pulse 94   Temp 97.5 °F (36.4 °C)   Resp 18   Ht 6' 1\" (1.854 m)   Wt 97.5 kg (215 lb)   SpO2 97%   BMI 28.37 kg/m²      O2 Device: None (Room air)    Temp (24hrs), Av.9 °F (36.6 °C), Min:97.4 °F (36.3 °C), Max:98.7 °F (37.1 °C)    1901 - 700  In: -   Out: 150 [Urine:150]   701 - 1900  In: 370 [P.O.:320; I.V.:50]  Out: 150 [Urine:150]    General:  Awake, cooperative, no distress. Head:  Normocephalic, without obvious abnormality, atraumatic. Eyes:  Conjunctivae/corneas clear, sclera anicteric, PERRL, EOMs intact. Nose: Nares normal. No drainage or sinus tenderness. Throat: Lips, mucosa, and tongue normal.    Neck: Supple, symmetrical, trachea midline, no adenopathy. Lungs:    Wheezing heard throughout all lung fields, diminished air movement. Heart:  Regular rate and rhythm, S1, S2 normal, no murmur, click, rub or gallop. Abdomen: Soft, non-tender. Bowel sounds normal. No masses,  No organomegaly. Extremities: Extremities normal, atraumatic, no cyanosis or edema. Capillary refill normal.   Pulses: 2+ and symmetric all extremities. Skin: Skin color as per ethnicity, turgor normal. No rashes or lesions   Neurologic: CNII-XII intact. No focal motor or sensory deficit.        Labs Reviewed:    Recent Results (from the past 24 hour(s))   EKG, 12 LEAD, INITIAL    Collection Time: 21  9:15 AM   Result Value Ref Range    Ventricular Rate 90 BPM    Atrial Rate 90 BPM    P-R Interval 136 ms    QRS Duration 86 ms    Q-T Interval 378 ms    QTC Calculation (Bezet) 462 ms    Calculated P Axis 83 degrees    Calculated R Axis -11 degrees Calculated T Axis 70 degrees    Diagnosis       Normal sinus rhythm  Normal ECG  Confirmed by Aakash Contreras MD, [facility] ([id number]) on 12/7/2021 11:29:01 PM     CBC WITH AUTOMATED DIFF    Collection Time: 12/07/21  9:20 AM   Result Value Ref Range    WBC 7.9 4.6 - 13.2 K/uL    RBC 4.71 4.35 - 5.65 M/uL    HGB 13.6 13.0 - 16.0 g/dL    HCT 44.2 36.0 - 48.0 %    MCV 93.8 78.0 - 100.0 FL    MCH 28.9 24.0 - 34.0 PG    MCHC 30.8 (L) 31.0 - 37.0 g/dL    RDW 16.1 (H) 11.6 - 14.5 %    PLATELET 761 036 - 765 K/uL    MPV 10.0 9.2 - 11.8 FL    NRBC 0.0 0  WBC    ABSOLUTE NRBC 0.00 0.00 - 0.01 K/uL    NEUTROPHILS 62 40 - 73 %    LYMPHOCYTES 23 21 - 52 %    MONOCYTES 7 3 - 10 %    EOSINOPHILS 7 (H) 0 - 5 %    BASOPHILS 0 0 - 2 %    IMMATURE GRANULOCYTES 0 0.0 - 0.5 %    ABS. NEUTROPHILS 4.9 1.8 - 8.0 K/UL    ABS. LYMPHOCYTES 1.8 0.9 - 3.6 K/UL    ABS. MONOCYTES 0.6 0.05 - 1.2 K/UL    ABS. EOSINOPHILS 0.6 (H) 0.0 - 0.4 K/UL    ABS. BASOPHILS 0.0 0.0 - 0.1 K/UL    ABS. IMM. GRANS. 0.0 0.00 - 0.04 K/UL    DF AUTOMATED     METABOLIC PANEL, COMPREHENSIVE    Collection Time: 12/07/21  9:20 AM   Result Value Ref Range    Sodium 141 136 - 145 mmol/L    Potassium 3.8 3.5 - 5.5 mmol/L    Chloride 107 100 - 111 mmol/L    CO2 29 21 - 32 mmol/L    Anion gap 5 3.0 - 18 mmol/L    Glucose 92 74 - 99 mg/dL    BUN 6 (L) 7.0 - 18 MG/DL    Creatinine 0.93 0.6 - 1.3 MG/DL    BUN/Creatinine ratio 6 (L) 12 - 20      GFR est AA >60 >60 ml/min/1.73m2    GFR est non-AA >60 >60 ml/min/1.73m2    Calcium 8.5 8.5 - 10.1 MG/DL    Bilirubin, total 1.2 (H) 0.2 - 1.0 MG/DL    ALT (SGPT) 22 16 - 61 U/L    AST (SGOT) 13 10 - 38 U/L    Alk.  phosphatase 78 45 - 117 U/L    Protein, total 6.6 6.4 - 8.2 g/dL    Albumin 2.8 (L) 3.4 - 5.0 g/dL    Globulin 3.8 2.0 - 4.0 g/dL    A-G Ratio 0.7 (L) 0.8 - 1.7     CARDIAC PANEL,(CK, CKMB & TROPONIN)    Collection Time: 12/07/21  9:20 AM   Result Value Ref Range    CK - MB <1.0 <3.6 ng/ml    CK-MB Index  0.0 - 4.0 %     CALCULATION NOT PERFORMED WHEN RESULT IS BELOW LINEAR LIMIT    CK 96 39 - 308 U/L    Troponin-I, QT <0.02 0.0 - 0.045 NG/ML       Procedures/imaging: see electronic medical records for all procedures/Xrays and details which were not copied into this note but were reviewed prior to creation of Plan      CC: Evonnie Koyanagi, MD

## 2021-12-07 NOTE — ED PROVIDER NOTES
EMERGENCY DEPARTMENT HISTORY AND PHYSICAL EXAM    9:27 AM    Date: 12/7/2021  Patient Name: Suzette Murillo    History of Presenting Illness     Chief Complaint   Patient presents with    Shortness of Breath       History Provided By: Patient  Location/Duration/Severity/Modifying factors   HPI  Suzette Murillo is a 70 y.o. male with past medical history of COPD not on home oxygen, cocaine abuse presenting for wheezing and shortness of breath. Patient says that for the last few weeks he has had gradually worsening wheezing and shortness of breath, which he attributes to the change in weather and trees. He has been using his albuterol inhaler and DuoNeb nebulizer at home without much improvement of his symptoms, last uses DuoNeb about 1 hour prior to arrival.  He denies associated fever but has had a cough productive of clear sputum. He has chest tightness across the front of his chest that is nonradiating, nonexertional and has been there for a few weeks. He says this feels just like his prior COPD exacerbations. He endorses using crack cocaine about 3 days ago, \"took 1 hit, that is all \". He denies that he is still smoking. Denies alcohol use. No other medical complaints. No abdominal pain, nausea, vomiting, diarrhea, diaphoresis. PCP: Maksim Soliman MD    Current Outpatient Medications   Medication Sig Dispense Refill    azithromycin (ZITHROMAX) 500 mg tab Take 1 Tablet by mouth daily. 3 Tablet 0    predniSONE (STERAPRED DS) 10 mg dose pack Take 6 tablets by mouth daily for 2 days, then take 5 tablets by mouth daily for 2 days then take 4 tablets by mouth daily for 2 days, then take 3 tablets by mouth daily for 2 days, then take 2 tablets by mouth daily for 2 days, then take 1 tablet by mouth daily for 2 days 42 Tablet 0    DULoxetine (CYMBALTA) 20 mg capsule 20 mg.      therapeutic multivitamin-minerals (THERAGRAN-M) tablet Take 1 Tab by Mouth Once a Day.       traMADol (ULTRAM) 50 mg tablet 50 mg.      ARIPiprazole (ABILIFY) 5 mg tablet Take 1 Tab by mouth daily. Indications: DEPRESSION TREATMENT ADJUNCT 30 Tab 1    dabigatran etexilate (PRADAXA) 150 mg capsule Take 1 Cap by mouth every twelve (12) hours. Indications: PREVENT THROMBOEMBOLISM IN CHRONIC ATRIAL FIBRILLATION, PREVENTION OF DEEP VEIN THROMBOSIS RECURRENCE 60 Cap 1    albuterol (PROVENTIL HFA, VENTOLIN HFA, PROAIR HFA) 90 mcg/actuation inhaler Take 2 Puffs by inhalation every four (4) hours as needed for Wheezing or Shortness of Breath (Cough). 1 Inhaler 0    inhalational spacing device ALWAYS USE WITH INHALER 1 Device 0    SPIRIVA WITH HANDIHALER 18 mcg inhalation capsule Take 1 Cap by inhalation daily. 2/1/17 Dr. Alex Mosqueda omeprazole (PRILOSEC) 20 mg capsule Take 1 Cap by mouth two (2) times a day. 2/1/17, QTY#60, 30 Days. 2/1/17 Dr. Alex Mosqueda STOOL SOFTENER 100 mg capsule Take 1 Cap by mouth daily. 2/1/17, QTY#100 Caps, 30 Days. Dr. Alex Mosqueda budesonide-formoterol [facility]) 160-4.5 mcg/actuation HFA inhaler Take 2 Puffs by inhalation two (2) times a day.  dabigatran etexilate (PRADAXA) 150 mg capsule Take 150 mg by mouth every twelve (12) hours.  albuterol-ipratropium (DUONEB) 2.5 mg-0.5 mg/3 ml nebu 3 mL by Nebulization route every six (6) hours as needed.          Past History     Past Medical History:  Past Medical History:   Diagnosis Date    Alcohol abuse     Anxiety     Asthma     Bronchitis     Chronic deep vein thrombosis (DVT) ([facility] [address] 07/2013 07/2013, 12/2014 ([facility])    Cocaine abuse ([facility] [address]     COPD     Depression     GERD (gastroesophageal reflux disease)     Hyperlipidemia     Hypertension     Major depression     Mild diastolic dysfunction     NEIL on CPAP     Renal insufficiency     Suicidal ideation     Vitamin D deficiency        Past Surgical History:  Past Surgical History:   Procedure Laterality Date    HX HIP REPLACEMENT  08/2010    HX OTHER SURGICAL  06/2018    ENDOVASCULAR ANEURYSM REPAIR       Family History:  No family history on file. Social History:  Social History     Tobacco Use    Smoking status: Former Smoker    Smokeless tobacco: Never Used    Tobacco comment: States that he quit 12 years ago. Substance Use Topics    Alcohol use: Yes    Drug use: Yes     Types: Cocaine       Allergies: Allergies   Allergen Reactions    Shellfish Derived Anaphylaxis       I reviewed and confirmed the above information with patient and updated as necessary. Review of Systems     Review of Systems   Constitutional: Negative for diaphoresis and fever. HENT: Negative for ear pain and sore throat. Eyes:        No acute change in vision   Respiratory: Positive for cough, shortness of breath and wheezing. Cardiovascular: Negative for chest pain and leg swelling. Gastrointestinal: Negative for abdominal pain and vomiting. Genitourinary: Negative for dysuria. Musculoskeletal: Negative for neck pain. Skin: Negative for wound. Neurological: Negative for weakness and headaches. Physical Exam     Visit Vitals  /80   Pulse 82   Temp 98.7 °F (37.1 °C)   Resp 29   Ht 6' 1\" (1.854 m)   Wt 97.5 kg (215 lb)   SpO2 98%   BMI 28.37 kg/m²       Physical Exam  Vitals and nursing note reviewed. Constitutional:       Appearance: Normal appearance. He is not ill-appearing. Comments: Adult male resting comfortably, no respiratory distress   HENT:      Mouth/Throat:      Mouth: Mucous membranes are moist.   Eyes:      Pupils: Pupils are equal, round, and reactive to light. Cardiovascular:      Rate and Rhythm: Normal rate and regular rhythm. Pulses: Normal pulses. Heart sounds: Normal heart sounds. Pulmonary:      Effort: Pulmonary effort is normal. No respiratory distress. Breath sounds: Wheezing (Expiratory wheezing in all lung fields) present. Abdominal:      General: Abdomen is flat. Tenderness: There is no abdominal tenderness. Musculoskeletal:         General: No swelling. Normal range of motion. Cervical back: Normal range of motion. Skin:     General: Skin is warm. Neurological:      General: No focal deficit present. Mental Status: He is alert and oriented to person, place, and time. Diagnostic Study Results     Labs -  Recent Results (from the past 24 hour(s))   EKG, 12 LEAD, INITIAL    Collection Time: 12/07/21  9:15 AM   Result Value Ref Range    Ventricular Rate 90 BPM    Atrial Rate 90 BPM    P-R Interval 136 ms    QRS Duration 86 ms    Q-T Interval 378 ms    QTC Calculation (Bezet) 462 ms    Calculated P Axis 83 degrees    Calculated R Axis -11 degrees    Calculated T Axis 70 degrees    Diagnosis       Normal sinus rhythm  Normal ECG  When compared with ECG of 23-NOV-2021 06:20,  No significant change was found     CBC WITH AUTOMATED DIFF    Collection Time: 12/07/21  9:20 AM   Result Value Ref Range    WBC 7.9 4.6 - 13.2 K/uL    RBC 4.71 4.35 - 5.65 M/uL    HGB 13.6 13.0 - 16.0 g/dL    HCT 44.2 36.0 - 48.0 %    MCV 93.8 78.0 - 100.0 FL    MCH 28.9 24.0 - 34.0 PG    MCHC 30.8 (L) 31.0 - 37.0 g/dL    RDW 16.1 (H) 11.6 - 14.5 %    PLATELET 880 831 - 530 K/uL    MPV 10.0 9.2 - 11.8 FL    NRBC 0.0 0  WBC    ABSOLUTE NRBC 0.00 0.00 - 0.01 K/uL    NEUTROPHILS 62 40 - 73 %    LYMPHOCYTES 23 21 - 52 %    MONOCYTES 7 3 - 10 %    EOSINOPHILS 7 (H) 0 - 5 %    BASOPHILS 0 0 - 2 %    IMMATURE GRANULOCYTES 0 0.0 - 0.5 %    ABS. NEUTROPHILS 4.9 1.8 - 8.0 K/UL    ABS. LYMPHOCYTES 1.8 0.9 - 3.6 K/UL    ABS. MONOCYTES 0.6 0.05 - 1.2 K/UL    ABS. EOSINOPHILS 0.6 (H) 0.0 - 0.4 K/UL    ABS. BASOPHILS 0.0 0.0 - 0.1 K/UL    ABS. IMM.  GRANS. 0.0 0.00 - 0.04 K/UL    DF AUTOMATED     METABOLIC PANEL, COMPREHENSIVE    Collection Time: 12/07/21  9:20 AM   Result Value Ref Range    Sodium 141 136 - 145 mmol/L    Potassium 3.8 3.5 - 5.5 mmol/L    Chloride 107 100 - 111 mmol/L    CO2 29 21 - 32 mmol/L    Anion gap 5 3.0 - 18 mmol/L    Glucose 92 74 - 99 mg/dL    BUN 6 (L) 7.0 - 18 MG/DL    Creatinine 0.93 0.6 - 1.3 MG/DL    BUN/Creatinine ratio 6 (L) 12 - 20      GFR est AA >60 >60 ml/min/1.73m2    GFR est non-AA >60 >60 ml/min/1.73m2    Calcium 8.5 8.5 - 10.1 MG/DL    Bilirubin, total 1.2 (H) 0.2 - 1.0 MG/DL    ALT (SGPT) 22 16 - 61 U/L    AST (SGOT) 13 10 - 38 U/L    Alk. phosphatase 78 45 - 117 U/L    Protein, total 6.6 6.4 - 8.2 g/dL    Albumin 2.8 (L) 3.4 - 5.0 g/dL    Globulin 3.8 2.0 - 4.0 g/dL    A-G Ratio 0.7 (L) 0.8 - 1.7     CARDIAC PANEL,(CK, CKMB & TROPONIN)    Collection Time: 12/07/21  9:20 AM   Result Value Ref Range    CK - MB <1.0 <3.6 ng/ml    CK-MB Index  0.0 - 4.0 %     CALCULATION NOT PERFORMED WHEN RESULT IS BELOW LINEAR LIMIT    CK 96 39 - 308 U/L    Troponin-I, QT <0.02 0.0 - 0.045 NG/ML         Radiologic Studies -   XR CHEST PORT   Final Result   Persistent right basilar consolidation, atelectasis or pneumonia. Improving right upper lobe consolidation. *   *                    Medical Decision Making   I am the first provider for this patient. I reviewed the vital signs, available nursing notes, past medical history, past surgical history, family history and social history. Vital Signs-Reviewed the patient's vital signs. Records Reviewed: Nursing Notes and Old Medical Records (Time of Review: 9:27 AM)    Provider Notes (Medical Decision Making):   MDM  71-year-old male here with wheezing and shortness of breath, likely due to COPD exacerbation, question medical adherence, polysubstance use, seasonal allergies, low suspicion for ACS. Doubt pulmonary embolism    ED Course: Progress Notes, Reevaluation, and Consults:  Patient arrives afebrile, satting 98% on room air without respiratory distress. He has expiratory wheezing in all lung fields. Remainder of physical exam is reassuring    We will screen labs, chest x-ray, troponin, EKG  We will treat with DuoNeb, magnesium, Solu-Medrol.     CBC shows no leukocytosis, CMP unremarkable  Chest x-ray shows persistent patchy infiltrates, however patient has no fever or white count, has completed antibiotics, low suspicion for acute pneumonia. Likely has atelectasis and underlying lung scarring  Troponin negative, EKG nondiagnostic    Patient is not hypoxic however when ambulating can make about 20 or 30 feet before becoming acutely tachypneic. Feel that he would benefit from further treatment, will require medical admission. Discussed with hospitalist who agrees. Appreciate her assistance. Procedures    Critical Care Time: 40 minutes of critical care time for three back-to-back nebs and COPD exacerbation requiring admission. Diagnosis     Clinical Impression:   1. SOB (shortness of breath)    2. COPD with acute exacerbation ([facility] [address]    3. Cocaine abuse ([facility]        Disposition: Admit    Follow-up Information    None          Patient's Medications   Start Taking    No medications on file   Continue Taking    ALBUTEROL (PROVENTIL HFA, VENTOLIN HFA, PROAIR HFA) 90 MCG/ACTUATION INHALER    Take 2 Puffs by inhalation every four (4) hours as needed for Wheezing or Shortness of Breath (Cough). ALBUTEROL-IPRATROPIUM (DUONEB) 2.5 MG-0.5 MG/3 ML NEBU    3 mL by Nebulization route every six (6) hours as needed. ARIPIPRAZOLE (ABILIFY) 5 MG TABLET    Take 1 Tab by mouth daily. Indications: DEPRESSION TREATMENT ADJUNCT    AZITHROMYCIN (ZITHROMAX) 500 MG TAB    Take 1 Tablet by mouth daily. BUDESONIDE-FORMOTEROL (SYMBICORT) 160-4.5 MCG/ACTUATION HFA INHALER    Take 2 Puffs by inhalation two (2) times a day. DABIGATRAN ETEXILATE (PRADAXA) 150 MG CAPSULE    Take 150 mg by mouth every twelve (12) hours. DABIGATRAN ETEXILATE (PRADAXA) 150 MG CAPSULE    Take 1 Cap by mouth every twelve (12) hours.  Indications: PREVENT THROMBOEMBOLISM IN CHRONIC ATRIAL FIBRILLATION, PREVENTION OF DEEP VEIN THROMBOSIS RECURRENCE    DULOXETINE (CYMBALTA) 20 MG CAPSULE 20 mg.    INHALATIONAL SPACING DEVICE    ALWAYS USE WITH INHALER    OMEPRAZOLE (PRILOSEC) 20 MG CAPSULE    Take 1 Cap by mouth two (2) times a day. 2/1/17, QTY#60, 30 Days. 2/1/17 Dr. [facility]) 10 MG DOSE PACK    Take 6 tablets by mouth daily for 2 days, then take 5 tablets by mouth daily for 2 days then take 4 tablets by mouth daily for 2 days, then take 3 tablets by mouth daily for 2 days, then take 2 tablets by mouth daily for 2 days, then take 1 tablet by mouth daily for 2 days    SPIRIVA WITH HANDIHALER 18 MCG INHALATION CAPSULE    Take 1 Cap by inhalation daily. 2/1/17 Dr. Moy Segura 100 MG CAPSULE    Take 1 Cap by mouth daily. 2/1/17, QTY#100 Caps, 30 Days. Dr. Jimenez Lucia [facility]) TABLET    Take 1 Tab by Mouth Once a Day. TRAMADOL (ULTRAM) 50 MG TABLET    50 mg. These Medications have changed    No medications on file   Stop Taking    No medications on file       Kylah Iqbal MD   Emergency Medicine   December 7, 2021, 9:27 AM     This note is dictated utilizing Dragon voice recognition software. Unfortunately this leads to occasional typographical errors using the voice recognition. I apologize in advance if the situation occurs. If questions occur please do not hesitate to contact me directly.     Susan Salcedo MD

## 2021-12-07 NOTE — ROUTINE PROCESS
TRANSFER - OUT REPORT:    Verbal report given to Baylee Albert  being transferred to [facility] for routine progression of care       Report consisted of patients Situation, Background, Assessment and   Recommendations(SBAR). Information from the following report(s) SBAR, ED Summary, Intake/Output, MAR, Recent Results and Cardiac Rhythm Normal sinus was reviewed with the receiving nurse. Lines:   Peripheral IV 12/07/21 Right Forearm (Active)   Site Assessment Clean, dry, & intact 12/07/21 0924   Phlebitis Assessment 0 12/07/21 0924   Infiltration Assessment 0 12/07/21 0924   Dressing Status Clean, dry, & intact 12/07/21 5477        Opportunity for questions and clarification was provided.       Patient transported with:   [employer]

## 2021-12-08 LAB
ANION GAP SERPL CALC-SCNC: 7 MMOL/L (ref 3–18)
BUN SERPL-MCNC: 17 MG/DL (ref 7–18)
BUN/CREAT SERPL: 16 (ref 12–20)
CALCIUM SERPL-MCNC: 8.7 MG/DL (ref 8.5–10.1)
CHLORIDE SERPL-SCNC: 107 MMOL/L (ref 100–111)
CO2 SERPL-SCNC: 28 MMOL/L (ref 21–32)
CREAT SERPL-MCNC: 1.07 MG/DL (ref 0.6–1.3)
ERYTHROCYTE [DISTWIDTH] IN BLOOD BY AUTOMATED COUNT: 15.8 % (ref 11.6–14.5)
GLUCOSE BLD STRIP.AUTO-MCNC: 258 MG/DL (ref 70–110)
GLUCOSE BLD STRIP.AUTO-MCNC: 280 MG/DL (ref 70–110)
GLUCOSE SERPL-MCNC: 195 MG/DL (ref 74–99)
HCT VFR BLD AUTO: 38.6 % (ref 36–48)
HGB BLD-MCNC: 12.2 G/DL (ref 13–16)
MCH RBC QN AUTO: 29.8 PG (ref 24–34)
MCHC RBC AUTO-ENTMCNC: 31.6 G/DL (ref 31–37)
MCV RBC AUTO: 94.4 FL (ref 78–100)
NRBC # BLD: 0 K/UL (ref 0–0.01)
NRBC BLD-RTO: 0 PER 100 WBC
PLATELET # BLD AUTO: 163 K/UL (ref 135–420)
PMV BLD AUTO: 10 FL (ref 9.2–11.8)
POTASSIUM SERPL-SCNC: 4.8 MMOL/L (ref 3.5–5.5)
RBC # BLD AUTO: 4.09 M/UL (ref 4.35–5.65)
SODIUM SERPL-SCNC: 142 MMOL/L (ref 136–145)
WBC # BLD AUTO: 8.1 K/UL (ref 4.6–13.2)

## 2021-12-08 PROCEDURE — 80048 BASIC METABOLIC PNL TOTAL CA: CPT

## 2021-12-08 PROCEDURE — 36415 COLL VENOUS BLD VENIPUNCTURE: CPT

## 2021-12-08 PROCEDURE — 94640 AIRWAY INHALATION TREATMENT: CPT

## 2021-12-08 PROCEDURE — 74011636637 HC RX REV CODE- 636/637: Performed by: FAMILY MEDICINE

## 2021-12-08 PROCEDURE — 85027 COMPLETE CBC AUTOMATED: CPT

## 2021-12-08 PROCEDURE — 74011250636 HC RX REV CODE- 250/636: Performed by: FAMILY MEDICINE

## 2021-12-08 PROCEDURE — 74011000250 HC RX REV CODE- 250: Performed by: FAMILY MEDICINE

## 2021-12-08 PROCEDURE — 82962 GLUCOSE BLOOD TEST: CPT

## 2021-12-08 PROCEDURE — 74011250637 HC RX REV CODE- 250/637: Performed by: FAMILY MEDICINE

## 2021-12-08 PROCEDURE — 65270000029 HC RM PRIVATE

## 2021-12-08 PROCEDURE — 74011000258 HC RX REV CODE- 258: Performed by: FAMILY MEDICINE

## 2021-12-08 RX ORDER — MAGNESIUM SULFATE 100 %
4 CRYSTALS MISCELLANEOUS AS NEEDED
Status: DISCONTINUED | OUTPATIENT
Start: 2021-12-08 | End: 2021-12-12 | Stop reason: HOSPADM

## 2021-12-08 RX ORDER — DULOXETIN HYDROCHLORIDE 20 MG/1
20 CAPSULE, DELAYED RELEASE ORAL DAILY
Status: DISCONTINUED | OUTPATIENT
Start: 2021-12-09 | End: 2021-12-12 | Stop reason: HOSPADM

## 2021-12-08 RX ORDER — DOCUSATE SODIUM 100 MG/1
100 CAPSULE, LIQUID FILLED ORAL DAILY
Status: DISCONTINUED | OUTPATIENT
Start: 2021-12-09 | End: 2021-12-12 | Stop reason: HOSPADM

## 2021-12-08 RX ORDER — DEXTROSE 50 % IN WATER (D50W) INTRAVENOUS SYRINGE
25-50 AS NEEDED
Status: DISCONTINUED | OUTPATIENT
Start: 2021-12-08 | End: 2021-12-12 | Stop reason: HOSPADM

## 2021-12-08 RX ORDER — INSULIN LISPRO 100 [IU]/ML
INJECTION, SOLUTION INTRAVENOUS; SUBCUTANEOUS
Status: DISCONTINUED | OUTPATIENT
Start: 2021-12-08 | End: 2021-12-12 | Stop reason: HOSPADM

## 2021-12-08 RX ORDER — ARIPIPRAZOLE 5 MG/1
5 TABLET ORAL DAILY
Status: DISCONTINUED | OUTPATIENT
Start: 2021-12-09 | End: 2021-12-12 | Stop reason: HOSPADM

## 2021-12-08 RX ORDER — DABIGATRAN ETEXILATE 75 MG/1
150 CAPSULE ORAL EVERY 12 HOURS
Status: DISCONTINUED | OUTPATIENT
Start: 2021-12-08 | End: 2021-12-12 | Stop reason: HOSPADM

## 2021-12-08 RX ADMIN — DOXYCYCLINE 100 MG: 100 INJECTION, POWDER, LYOPHILIZED, FOR SOLUTION INTRAVENOUS at 21:22

## 2021-12-08 RX ADMIN — Medication 10 ML: at 14:00

## 2021-12-08 RX ADMIN — METHYLPREDNISOLONE SODIUM SUCCINATE 125 MG: 125 INJECTION, POWDER, FOR SOLUTION INTRAMUSCULAR; INTRAVENOUS at 19:14

## 2021-12-08 RX ADMIN — INSULIN LISPRO 6 UNITS: 100 INJECTION, SOLUTION INTRAVENOUS; SUBCUTANEOUS at 22:03

## 2021-12-08 RX ADMIN — BUDESONIDE 500 MCG: 0.5 INHALANT RESPIRATORY (INHALATION) at 19:27

## 2021-12-08 RX ADMIN — DOXYCYCLINE 100 MG: 100 INJECTION, POWDER, LYOPHILIZED, FOR SOLUTION INTRAVENOUS at 09:34

## 2021-12-08 RX ADMIN — METHYLPREDNISOLONE SODIUM SUCCINATE 60 MG: 125 INJECTION, POWDER, FOR SOLUTION INTRAMUSCULAR; INTRAVENOUS at 00:02

## 2021-12-08 RX ADMIN — IPRATROPIUM BROMIDE AND ALBUTEROL SULFATE 3 ML: .5; 3 SOLUTION RESPIRATORY (INHALATION) at 00:31

## 2021-12-08 RX ADMIN — HEPARIN SODIUM 5000 UNITS: 5000 INJECTION INTRAVENOUS; SUBCUTANEOUS at 00:02

## 2021-12-08 RX ADMIN — IPRATROPIUM BROMIDE AND ALBUTEROL SULFATE 3 ML: .5; 3 SOLUTION RESPIRATORY (INHALATION) at 07:31

## 2021-12-08 RX ADMIN — IPRATROPIUM BROMIDE AND ALBUTEROL SULFATE 3 ML: .5; 3 SOLUTION RESPIRATORY (INHALATION) at 14:25

## 2021-12-08 RX ADMIN — Medication 10 ML: at 05:54

## 2021-12-08 RX ADMIN — BUDESONIDE 500 MCG: 0.5 INHALANT RESPIRATORY (INHALATION) at 07:31

## 2021-12-08 RX ADMIN — METHYLPREDNISOLONE SODIUM SUCCINATE 125 MG: 125 INJECTION, POWDER, FOR SOLUTION INTRAMUSCULAR; INTRAVENOUS at 05:54

## 2021-12-08 RX ADMIN — METHYLPREDNISOLONE SODIUM SUCCINATE 125 MG: 125 INJECTION, POWDER, FOR SOLUTION INTRAMUSCULAR; INTRAVENOUS at 12:50

## 2021-12-08 RX ADMIN — INSULIN LISPRO 6 UNITS: 100 INJECTION, SOLUTION INTRAVENOUS; SUBCUTANEOUS at 17:49

## 2021-12-08 RX ADMIN — DABIGATRAN ETEXILATE MESYLATE 150 MG: 75 CAPSULE ORAL at 21:21

## 2021-12-08 RX ADMIN — HEPARIN SODIUM 5000 UNITS: 5000 INJECTION INTRAVENOUS; SUBCUTANEOUS at 09:34

## 2021-12-08 RX ADMIN — Medication 10 ML: at 23:05

## 2021-12-08 NOTE — DIABETES MGMT
Diabetes/ Glycemic Control Plan of Care  Recommendations:   Recommend IP glucose monitoring and correctional Humalog while patient is receiving steroids. Assessment: Patient without known history of diabetes  However last HgbA1c of 6.6% which may meet diabetes criteria. Patient with hyperglycemia induced by steroids. Fasting glucose this morning 195 mg/dL. DX:   1. SOB (shortness of breath)     2. COPD with acute exacerbation ([facility] [address]     3. Cocaine abuse ([facility]          Steroids:   Rx Glucocorticoids (24h ago, onward)             Start     Dose Route Frequency Ordered Stop    12/08/21 0600  methylPREDNISolone (PF) (Solu-MEDROL) injection 125 mg         125 mg IV EVERY 6 HOURS 12/08/21 0145 --           Recent Glucose Results:   Lab Results   Component Value Date/Time     (H) 12/08/2021 05:28 AM         Within target range (70-180mg/dL): No  Current insulin orders: None  Total Daily Dose previous 24 hours = None  Current A1c:   Lab Results   Component Value Date/Time    Hemoglobin A1c 6.6 (H) 11/24/2021 03:50 AM      equivalent  to ave Blood Glucose of 143 mg/dl for 2-3 months prior to admission  Adequate glycemic control PTA: Yes  Nutrition/Diet:   Active Orders   Diet    ADULT DIET Regular     Home diabetes medications:   Key Antihyperglycemic Medications     Patient is on no antihyperglycemic meds. Plan/Goals:   Blood glucose will be within target of 70 - 180 mg/dl within 72 hours  Reinforce dietary and medication compliance at home.          Education:  [] Refer to Diabetes Education Record                       [x] Education not indicated at this time     Edelmira Quintana, [address] [place] [address]  Glycemic Control Team  [phone]

## 2021-12-08 NOTE — PROGRESS NOTES
Reason for Admission:   Shortness of breath with wheezing, chest tightness and nonproductive cough                    RUR Score:  Mod; 15%                PCP: First and Last name:   Blanca Watts MD     Name of Practice:    Are you a current patient: Yes/No:    Approximate date of last visit:    Can you participate in a virtual visit if needed:     Do you (patient/family) have any concerns for transition/discharge? Plan for utilizing home health:       Current Advanced Directive/Advance Care Plan:  Full Code      Healthcare Decision Maker:   Click here to complete [address] including selection of the Healthcare Decision Maker Relationship (ie \"Primary\")            Primary Decision Maker: Helen Blair - Sister - [phone]    Transition of Care Plan:     Home with physician follow up vs [facility] and physician follow up      Chart reviewed. Per H&P \"Larry Tapia is a 70 y.o. male   with a past medical history of COPD/asthma not on home oxygen, cocaine abuse, hypertension, depression, NEIL on CPAP but at last admission had issues with his CPAP and was not wearing it properly, noncompliance to medical treatment presents to the emergency room for shortness of breath, chest tightness nonproductive dry cough and wheezing that has been gradually worsening over the last 1 to 2 weeks. He states that he has been using his albuterol inhaler and his DuoNeb nebulizer at home without much improvement of his symptoms. When he arrived in the emergency room he just uses DuoNeb about an hour prior. Apparently he still had a prednisone taper and was still taking tapering doses of that medication. He was given Solu-Medrol, nebs and magnesium in the ED and attempt to try to wake him of his current exacerbation and sent him home however patient was unable to ambulate without significant respiratory distress. Can ambulate past 25 feet.   As he tried to walk that distance in the emergency room he would have to come back and sit down. Patient feels like his prior COPD exacerbations. He has received 2 doses of COVID-19 vaccine. He endorsed to the ED team that he is continuing to use crack cocaine which he smokes and he used it approximately 3 to 7 days ago. He is otherwise not smoking. Denies fever chills or night sweats. Also denies nausea vomiting diarrhea or chest pain. He is afebrile and nonhypoxic in the emergency room. His chest x-ray shows a right basilar consolidation that is persistent as atelectasis versus pneumonia. His right upper lobe consolidation has improved since last x-ray. I am being asked to admit this patient for COPD exacerbation. \"    CM met with pt at bedside to discuss transition of care. Pt lives with his significant other and is independent. Pt has access to a rollator and a RW if needed. Pt drives himself to and from appointments. Pt has indicated he will need assistance with transport home when discharged (Lyft). Anticipate pt will transition home with physician follow up vs [facility] and physician follow up when medically stable. CM to continue to follow and assist.    Care Management Interventions  Mode of Transport at Discharge:  Other (see comment) (Lyft)  Health Maintenance Reviewed: Yes  Support Systems: Spouse/Significant Other, Other Family Member(s)  Confirm Follow Up Transport: Self  The Plan for Transition of Care is Related to the Following Treatment Goals : Home with physician follow up vs [facility] and physician follow up  Discharge Location  Discharge Placement: Home with family assistance

## 2021-12-08 NOTE — PROGRESS NOTES
Hospitalist Progress Note    Patient: Darlin Beebe MRN: [id number]  CSN: [id number]    YOB: 1950  Age: 70 y.o. Sex: male    DOA: 12/7/2021 LOS:  LOS: 1 day          Chief Complaint:    SOB and wheezing     Assessment/Plan     Darlin Beebe is a 70 y.o. male   with a past medical history of COPD/asthma not on home oxygen, cocaine abuse, hypertension, depression, NEIL on CPAP and noncompliance to medical treatment presents to the emergency room for shortness of breath, chest tightness nonproductive dry cough and wheezing.   Admitted for COPD exacerbation.      COPD exacerbation: High-dose Solu-Medrol IV for 24 hours, DuoNebs as needed, Pulmicort twice daily, head of bed elevated 30 degrees, incentive spirometer, good pulmonary toilet, O2 to keep oxygen saturations greater than 92%     Cocaine use/abuse: Advised permanent cessation     Hypertension: Nonsustained elevations in blood pressure, resume home medication     NEIL on CPAP: Nighttime CPAP while in hospital     Noncompliance to medical treatment: Encouraged compliance with entire medical plan, cessation of smoking cocaine     Right basilar consolidation: Persistent, doxycycline IV twice daily     Full code     DVT and GI prophylaxis ordered  Disposition :  Patient Active Problem List   Diagnosis Code    Cocaine abuse ([facility] F14.10    Alcohol abuse F10.10    COPD (chronic obstructive pulmonary disease) ([facility] J44.9    COPD exacerbation ([facility] J44.1    Dyslipidemia E78.5    Gastroesophageal reflux disease without esophagitis K21.9    Problem related to housing and economic circumstances Z59.9    Vitamin D deficiency disease E55.9    Asthma J45.909    Chronic deep vein thrombosis (DVT) ([facility] I82.509    Obstructive sleep apnea G47.33    Major depression F32.9    Hypertension I10    Anxiety M62.0    Mild diastolic dysfunction R47.5    AAA (abdominal aortic aneurysm) ([facility] I71.4    Acute respiratory failure with hypoxia ([facility] J96.01    Pneumonia J18.9    COPD with acute exacerbation ([facility] J44.1    Noncompliance with CPAP treatment Z91.14    Consolidation of right lower lobe of lung (HCC) J18.1       Subjective:    Wants to update his med list with Pradaxa. Feels like he is wheezing less and is feeling better overall. Review of systems:    Constitutional: denies fevers, chills, myalgias  Respiratory: denies SOB, cough  Cardiovascular: denies chest pain, palpitations  Gastrointestinal: denies nausea, vomiting, diarrhea      Vital signs/Intake and Output:  Visit Vitals  /81 (BP 1 Location: Right upper arm, BP Patient Position: At rest)   Pulse 83   Temp 97.8 °F (36.6 °C)   Resp 18   Ht 6' 1\" (1.854 m)   Wt 97.5 kg (215 lb)   SpO2 98%   BMI 28.37 kg/m²     Current Shift:  No intake/output data recorded. Last three shifts:  12/06 1901 - 12/08 0700  In: 370 [P.O.:320;  I.V.:50]  Out: 700 [Urine:700]    Exam:    General: Well developed, alert, NAD, OX3  Head/Neck: NCAT, supple, No masses, No lymphadenopathy  CVS:Regular rate and rhythm, no M/R/G, S1/S2 heard, no thrill  Lungs:Clear to auscultation bilaterally, no wheezes, rhonchi, or rales  Abdomen: Soft, Nontender, No distention, Normal Bowel sounds, No hepatomegaly  Extremities: No C/C/E, pulses palpable 2+  Skin:normal texture and turgor, no rashes, no lesions  Neuro:grossly normal , follows commands  Psych:appropriate                Labs: Results:       Chemistry Recent Labs     12/08/21  0528 12/07/21  0920   * 92    141   K 4.8 3.8    107   CO2 28 29   BUN 17 6*   CREA 1.07 0.93   CA 8.7 8.5   AGAP 7 5   BUCR 16 6*   AP  --  78   TP  --  6.6   ALB  --  2.8*   GLOB  --  3.8   AGRAT  --  0.7*      CBC w/Diff Recent Labs     12/08/21  0528 12/07/21  0920   WBC 8.1 7.9   RBC 4.09* 4.71   HGB 12.2* 13.6   HCT 38.6 44.2    167   GRANS  --  62   LYMPH  --  23   EOS  --  7*      Cardiac Enzymes Recent Labs     12/07/21  0920   CPK 96   CKND1 CALCULATION NOT PERFORMED WHEN RESULT IS BELOW LINEAR LIMIT      Coagulation No results for input(s): PTP, INR, APTT, INREXT in the last 72 hours. Lipid Panel Lab Results   Component Value Date/Time    Cholesterol, total 188 04/03/2011 04:30 AM    HDL Cholesterol 89 (H) 04/03/2011 04:30 AM    LDL, calculated 87 04/03/2011 04:30 AM    VLDL, calculated 12 04/03/2011 04:30 AM    Triglyceride 60 04/03/2011 04:30 AM    CHOL/HDL Ratio 2.1 04/03/2011 04:30 AM      BNP No results for input(s): BNPP in the last 72 hours.    Liver Enzymes Recent Labs     12/07/21  0920   TP 6.6   ALB 2.8*   AP 78      Thyroid Studies No results found for: T4, T3U, TSH, TSHEXT     Procedures/imaging: see electronic medical records for all procedures/Xrays and details which were not copied into this note but were reviewed prior to creation of Omkar Davidson MD

## 2021-12-09 ENCOUNTER — APPOINTMENT (OUTPATIENT)
Dept: NON INVASIVE DIAGNOSTICS | Age: 71
DRG: 190 | End: 2021-12-09
Attending: HOSPITALIST
Payer: MEDICARE

## 2021-12-09 LAB
ANION GAP SERPL CALC-SCNC: 6 MMOL/L (ref 3–18)
BUN SERPL-MCNC: 24 MG/DL (ref 7–18)
BUN/CREAT SERPL: 22 (ref 12–20)
CALCIUM SERPL-MCNC: 8.6 MG/DL (ref 8.5–10.1)
CHLORIDE SERPL-SCNC: 108 MMOL/L (ref 100–111)
CO2 SERPL-SCNC: 26 MMOL/L (ref 21–32)
CREAT SERPL-MCNC: 1.08 MG/DL (ref 0.6–1.3)
ECHO AO ROOT DIAM: 4.34 CM
ECHO AV MEAN GRADIENT: 3.66 MMHG
ECHO AV PEAK GRADIENT: 6.41 MMHG
ECHO AV PEAK VELOCITY: 126.54 CM/S
ECHO AV VTI: 29.7 CM
ECHO EST RA PRESSURE: 3 MMHG
ECHO IVC PROX: 2.11 CM
ECHO LA AREA 4C: 14.6 CM2
ECHO LA MAJOR AXIS: 3.32 CM
ECHO LA MINOR AXIS: 1.51 CM
ECHO LA VOL 2C: 48.97 ML (ref 18–58)
ECHO LA VOL 4C: 29.74 ML (ref 18–58)
ECHO LA VOL BP: 45.15 ML (ref 18–58)
ECHO LA VOL/BSA BIPLANE: 20.52 ML/M2 (ref 16–28)
ECHO LA VOLUME INDEX A2C: 22.26 ML/M2 (ref 16–28)
ECHO LA VOLUME INDEX A4C: 13.52 ML/M2 (ref 16–28)
ECHO LV E' LATERAL VELOCITY: 12.03 CM/S
ECHO LV E' SEPTAL VELOCITY: 8.01 CM/S
ECHO LV EDV A2C: 126.67 ML
ECHO LV EDV A4C: 162.16 ML
ECHO LV EDV BP: 142.63 ML (ref 67–155)
ECHO LV EDV INDEX A4C: 73.7 ML/M2
ECHO LV EDV INDEX BP: 64.8 ML/M2
ECHO LV EDV NDEX A2C: 57.6 ML/M2
ECHO LV EJECTION FRACTION A2C: 61 PERCENT
ECHO LV EJECTION FRACTION A4C: 63 PERCENT
ECHO LV EJECTION FRACTION BIPLANE: 61.1 PERCENT (ref 55–100)
ECHO LV ESV A2C: 49.59 ML
ECHO LV ESV A4C: 60.09 ML
ECHO LV ESV BP: 55.49 ML (ref 22–58)
ECHO LV ESV INDEX A2C: 22.5 ML/M2
ECHO LV ESV INDEX A4C: 27.3 ML/M2
ECHO LV ESV INDEX BP: 25.2 ML/M2
ECHO LV GLOBAL LONGITUDINAL STRAIN (GLS): -16.3 PERCENT
ECHO LV INTERNAL DIMENSION DIASTOLIC: 5.71 CM (ref 4.2–5.9)
ECHO LV INTERNAL DIMENSION SYSTOLIC: 3.97 CM
ECHO LV IVSD: 0.82 CM (ref 0.6–1)
ECHO LV MASS 2D: 199.5 G (ref 88–224)
ECHO LV MASS INDEX 2D: 90.7 G/M2 (ref 49–115)
ECHO LV POSTERIOR WALL DIASTOLIC: 0.99 CM (ref 0.6–1)
ECHO LVOT DIAM: 2.44 CM
ECHO LVOT SV: 77.1 ML
ECHO MV A VELOCITY: 97.08 CM/S
ECHO MV E DECELERATION TIME (DT): 201.15 MS
ECHO MV E VELOCITY: 92.89 CM/S
ECHO MV E/A RATIO: 0.96
ECHO MV E/E' LATERAL: 7.72
ECHO MV E/E' RATIO (AVERAGED): 9.66
ECHO MV E/E' SEPTAL: 11.6
ECHO PV REGURGITANT END DIASTOLIC MAX VELOCITY: 51.43 CM/S
ECHO RA AREA 4C: 16.83 CM2
ECHO RIGHT VENTRICULAR SYSTOLIC PRESSURE (RVSP): 29.06 MMHG
ECHO RV INTERNAL DIMENSION: 3.81 CM
ECHO RV TAPSE: 1.84 CM (ref 1.5–2)
ECHO RV TAPSE: 1.91 CM (ref 1.5–2)
ECHO TV REGURGITANT MAX VELOCITY: 255.23 CM/S
ECHO TV REGURGITANT PEAK GRADIENT: 26.06 MMHG
ERYTHROCYTE [DISTWIDTH] IN BLOOD BY AUTOMATED COUNT: 15.8 % (ref 11.6–14.5)
GLOBAL LONGITUDINAL STRAIN 2 CHAMBER: -18.3 PERCENT
GLOBAL LONGITUDINAL STRAIN 4 CHAMBER: -16.8 PERCENT
GLOBAL LONGITUDINAL STRAIN LONG AXIS: -13.9 PERCENT
GLUCOSE BLD STRIP.AUTO-MCNC: 245 MG/DL (ref 70–110)
GLUCOSE BLD STRIP.AUTO-MCNC: 313 MG/DL (ref 70–110)
GLUCOSE BLD STRIP.AUTO-MCNC: 317 MG/DL (ref 70–110)
GLUCOSE BLD STRIP.AUTO-MCNC: 357 MG/DL (ref 70–110)
GLUCOSE BLD STRIP.AUTO-MCNC: 420 MG/DL (ref 70–110)
GLUCOSE SERPL-MCNC: 258 MG/DL (ref 74–99)
HCT VFR BLD AUTO: 37.3 % (ref 36–48)
HGB BLD-MCNC: 11.7 G/DL (ref 13–16)
LA VOL DISK BP: 39.72 ML (ref 18–58)
MCH RBC QN AUTO: 29.2 PG (ref 24–34)
MCHC RBC AUTO-ENTMCNC: 31.4 G/DL (ref 31–37)
MCV RBC AUTO: 93 FL (ref 78–100)
NRBC # BLD: 0 K/UL (ref 0–0.01)
NRBC BLD-RTO: 0 PER 100 WBC
PLATELET # BLD AUTO: 177 K/UL (ref 135–420)
PMV BLD AUTO: 10.6 FL (ref 9.2–11.8)
POTASSIUM SERPL-SCNC: 4 MMOL/L (ref 3.5–5.5)
RBC # BLD AUTO: 4.01 M/UL (ref 4.35–5.65)
SODIUM SERPL-SCNC: 140 MMOL/L (ref 136–145)
WBC # BLD AUTO: 12.8 K/UL (ref 4.6–13.2)

## 2021-12-09 PROCEDURE — 74011000250 HC RX REV CODE- 250: Performed by: FAMILY MEDICINE

## 2021-12-09 PROCEDURE — 85027 COMPLETE CBC AUTOMATED: CPT

## 2021-12-09 PROCEDURE — 36415 COLL VENOUS BLD VENIPUNCTURE: CPT

## 2021-12-09 PROCEDURE — 94640 AIRWAY INHALATION TREATMENT: CPT

## 2021-12-09 PROCEDURE — 74011250636 HC RX REV CODE- 250/636: Performed by: HOSPITALIST

## 2021-12-09 PROCEDURE — 74011250636 HC RX REV CODE- 250/636: Performed by: FAMILY MEDICINE

## 2021-12-09 PROCEDURE — 93306 TTE W/DOPPLER COMPLETE: CPT

## 2021-12-09 PROCEDURE — 65270000029 HC RM PRIVATE

## 2021-12-09 PROCEDURE — 74011250637 HC RX REV CODE- 250/637: Performed by: FAMILY MEDICINE

## 2021-12-09 PROCEDURE — 74011636637 HC RX REV CODE- 636/637: Performed by: FAMILY MEDICINE

## 2021-12-09 PROCEDURE — 80048 BASIC METABOLIC PNL TOTAL CA: CPT

## 2021-12-09 PROCEDURE — 74011000258 HC RX REV CODE- 258: Performed by: FAMILY MEDICINE

## 2021-12-09 PROCEDURE — 82962 GLUCOSE BLOOD TEST: CPT

## 2021-12-09 PROCEDURE — 94760 N-INVAS EAR/PLS OXIMETRY 1: CPT

## 2021-12-09 RX ADMIN — IPRATROPIUM BROMIDE AND ALBUTEROL SULFATE 3 ML: .5; 3 SOLUTION RESPIRATORY (INHALATION) at 00:33

## 2021-12-09 RX ADMIN — DULOXETINE HYDROCHLORIDE 20 MG: 20 CAPSULE, DELAYED RELEASE ORAL at 10:14

## 2021-12-09 RX ADMIN — INSULIN LISPRO 4 UNITS: 100 INJECTION, SOLUTION INTRAVENOUS; SUBCUTANEOUS at 07:30

## 2021-12-09 RX ADMIN — OXYCODONE 5 MG: 5 TABLET ORAL at 21:58

## 2021-12-09 RX ADMIN — Medication 10 ML: at 06:21

## 2021-12-09 RX ADMIN — METHYLPREDNISOLONE SODIUM SUCCINATE 60 MG: 125 INJECTION, POWDER, FOR SOLUTION INTRAMUSCULAR; INTRAVENOUS at 18:47

## 2021-12-09 RX ADMIN — METHYLPREDNISOLONE SODIUM SUCCINATE 60 MG: 125 INJECTION, POWDER, FOR SOLUTION INTRAMUSCULAR; INTRAVENOUS at 21:59

## 2021-12-09 RX ADMIN — DOXYCYCLINE 100 MG: 100 INJECTION, POWDER, LYOPHILIZED, FOR SOLUTION INTRAVENOUS at 21:57

## 2021-12-09 RX ADMIN — BUDESONIDE 500 MCG: 0.5 INHALANT RESPIRATORY (INHALATION) at 07:49

## 2021-12-09 RX ADMIN — DOCUSATE SODIUM 100 MG: 100 CAPSULE ORAL at 09:32

## 2021-12-09 RX ADMIN — Medication 10 ML: at 18:48

## 2021-12-09 RX ADMIN — METHYLPREDNISOLONE SODIUM SUCCINATE 60 MG: 125 INJECTION, POWDER, FOR SOLUTION INTRAMUSCULAR; INTRAVENOUS at 15:37

## 2021-12-09 RX ADMIN — Medication 10 ML: at 21:59

## 2021-12-09 RX ADMIN — BUDESONIDE 500 MCG: 0.5 INHALANT RESPIRATORY (INHALATION) at 20:30

## 2021-12-09 RX ADMIN — INSULIN LISPRO 10 UNITS: 100 INJECTION, SOLUTION INTRAVENOUS; SUBCUTANEOUS at 14:30

## 2021-12-09 RX ADMIN — ARIPIPRAZOLE 5 MG: 5 TABLET ORAL at 09:32

## 2021-12-09 RX ADMIN — INSULIN LISPRO 8 UNITS: 100 INJECTION, SOLUTION INTRAVENOUS; SUBCUTANEOUS at 21:58

## 2021-12-09 RX ADMIN — IPRATROPIUM BROMIDE AND ALBUTEROL SULFATE 3 ML: .5; 3 SOLUTION RESPIRATORY (INHALATION) at 06:17

## 2021-12-09 RX ADMIN — DABIGATRAN ETEXILATE MESYLATE 150 MG: 75 CAPSULE ORAL at 21:57

## 2021-12-09 RX ADMIN — DABIGATRAN ETEXILATE MESYLATE 150 MG: 75 CAPSULE ORAL at 10:14

## 2021-12-09 RX ADMIN — MULTIPLE VITAMINS W/ MINERALS TAB 1 TABLET: TAB at 09:32

## 2021-12-09 RX ADMIN — METHYLPREDNISOLONE SODIUM SUCCINATE 125 MG: 125 INJECTION, POWDER, FOR SOLUTION INTRAMUSCULAR; INTRAVENOUS at 00:34

## 2021-12-09 RX ADMIN — IPRATROPIUM BROMIDE AND ALBUTEROL SULFATE 3 ML: .5; 3 SOLUTION RESPIRATORY (INHALATION) at 20:30

## 2021-12-09 RX ADMIN — DOXYCYCLINE 100 MG: 100 INJECTION, POWDER, LYOPHILIZED, FOR SOLUTION INTRAVENOUS at 09:31

## 2021-12-09 RX ADMIN — METHYLPREDNISOLONE SODIUM SUCCINATE 80 MG: 125 INJECTION, POWDER, FOR SOLUTION INTRAMUSCULAR; INTRAVENOUS at 06:20

## 2021-12-09 RX ADMIN — INSULIN LISPRO 8 UNITS: 100 INJECTION, SOLUTION INTRAVENOUS; SUBCUTANEOUS at 16:30

## 2021-12-09 NOTE — PROGRESS NOTES
Bedside and verbal shift change report given to Nanci Gamboa RN (on coming nurse) by Erlinda Wilson RN (off going nurse). Report included the following information SBAR, Kardex, OR Summary, Intake/Output and MAR. PRN Duo-neb requested per pt for wheezing. Bedside and verbal shift change report given by Nanci Gamboa RN (off going nurse) to ANGELIKA BANG(on coming nurse). Report included the following information SBAR, Kardex, OR Summary, Intake/Output and MAR.

## 2021-12-09 NOTE — PROGRESS NOTES
Problem: Diabetes Self-Management  Goal: *Disease process and treatment process  Description: Define diabetes and identify own type of diabetes; list 3 options for treating diabetes. Outcome: Progressing Towards Goal  Goal: *Incorporating nutritional management into lifestyle  Description: Describe effect of type, amount and timing of food on blood glucose; list 3 methods for planning meals. Outcome: Progressing Towards Goal  Goal: *Incorporating physical activity into lifestyle  Description: State effect of exercise on blood glucose levels. Outcome: Progressing Towards Goal  Goal: *Developing strategies to promote health/change behavior  Description: Define the ABC's of diabetes; identify appropriate screenings, schedule and personal plan for screenings. Outcome: Progressing Towards Goal  Goal: *Using medications safely  Description: State effect of diabetes medications on diabetes; name diabetes medication taking, action and side effects. Outcome: Progressing Towards Goal  Goal: *Monitoring blood glucose, interpreting and using results  Description: Identify recommended blood glucose targets  and personal targets. Outcome: Progressing Towards Goal  Goal: *Prevention, detection, treatment of acute complications  Description: List symptoms of hyper- and hypoglycemia; describe how to treat low blood sugar and actions for lowering  high blood glucose level. Outcome: Progressing Towards Goal  Goal: *Prevention, detection and treatment of chronic complications  Description: Define the natural course of diabetes and describe the relationship of blood glucose levels to long term complications of diabetes.   Outcome: Progressing Towards Goal  Goal: *Developing strategies to address psychosocial issues  Description: Describe feelings about living with diabetes; identify support needed and support network  Outcome: Progressing Towards Goal  Goal: *Insulin pump training  Outcome: Progressing Towards Goal  Goal: *Sick day guidelines  Outcome: Progressing Towards Goal  Goal: *Patient Specific Goal (EDIT GOAL, INSERT TEXT)  Outcome: Progressing Towards Goal     Problem: Patient Education: Go to Patient Education Activity  Goal: Patient/Family Education  Outcome: Progressing Towards Goal     Problem: Falls - Risk of  Goal: *Absence of Falls  Description: Document Clydene Bone Fall Risk and appropriate interventions in the flowsheet.   Outcome: Progressing Towards Goal  Note: Fall Risk Interventions:            Medication Interventions: Patient to call before getting OOB                   Problem: Patient Education: Go to Patient Education Activity  Goal: Patient/Family Education  Outcome: Progressing Towards Goal

## 2021-12-09 NOTE — PROGRESS NOTES
6004: writer received SBAR. Patient alert and oriented able to make needs known tolerated all scheduled medications. 11:00Assessment completed  no signs of distress. Patient stated he  is always SOB but no problem with his breathing at this time. Patient lungs where dimished at bases with scattered wheezing/ whistling. No concerns voiced to writer call light remain in reach bed in lowest position    1845:Patient denies any pain at this time. Patient request breathing treatment writer called and paged respiratory awaiting a return call. Patient not displaying no signs of distress at this time will update receiving  nurse with SBAR.

## 2021-12-09 NOTE — PROGRESS NOTES
Pt a/ox 4, ad ayesha to the bathroom, SOB during exertion and talking. Per physician's pt should use CPAP at night. Pt refused CPAP.

## 2021-12-09 NOTE — PROGRESS NOTES
Hospitalist Progress Note-critical care note     Patient: Veda Chang MRN: [id number]  CSN: [id number]    YOB: 1950  Age: 70 y.o. Sex: male    DOA: 12/7/2021 LOS:  LOS: 2 days            Chief complaint: copd exacerbation  neil , htn cocaine abuse     Assessment/Plan         Hospital Problems  Date Reviewed: 12/14/2015          Codes Class Noted POA    * (Principal) COPD with acute exacerbation ([facility] ICD-10-CM: J44.1  ICD-9-CM: 491.21  12/7/2021 Unknown        Noncompliance with CPAP treatment ICD-10-CM: Z91.14  ICD-9-CM: V15.81  12/7/2021 Unknown        Consolidation of right lower lobe of lung ([facility] ICD-10-CM: J18.1  ICD-9-CM: 791  12/7/2021 Unknown        Obstructive sleep apnea ICD-10-CM: G47.33  ICD-9-CM: 327.23  Unknown Yes        Hypertension ICD-10-CM: I10  ICD-9-CM: 401.9  Unknown Yes        Cocaine abuse ([facility] (Chronic) ICD-10-CM: F14.10  ICD-9-CM: 305.60  12/13/2015 Yes                 Anamaria Magaña is a 70 y. o. male   with a past medical history of COPD/asthma not on home oxygen, cocaine abuse, hypertension, depression, NEIL on CPAP and noncompliance to medical treatment is  admitted for COPD exacerbation.      COPD exacerbation  Improving, still wheezing -close to baseline   Off nc oxygen now   Continue iv steroid -weaning     Cocaine use/abuse   Advised permanent cessation     Hypertension:   continue clonidine      NEIL on CPAP:   Nighttime CPAP     Noncompliance to medical treatment   education    Right basilar consolidation  Persistent right basilar consolidation, atelectasis or pneumonia. -  Improving right upper lobe consolidation.   Continue doxycycline IV twice daily    Subjective: feel better, I have chronic sob and wheezing, now I feel better, but not my baseline     Will have echo     Continue  pradaxa for chronic dvt , will check echo see cardiac function -due to chronic sob on exertion       Disposition :1-2 days   Review of systems:    General: No fevers or chills. Cardiovascular: No chest pain or pressure. No palpitations. Pulmonary: +shortness of breath on exertion    Gastrointestinal: No nausea, vomiting. Vital signs/Intake and Output:  Visit Vitals  BP (!) 144/86 (BP 1 Location: Right arm, BP Patient Position: At rest;Sitting)   Pulse 77   Temp 97.4 °F (36.3 °C)   Resp 18   Ht 6' 1\" (1.854 m)   Wt 97.5 kg (215 lb)   SpO2 98%   BMI 28.37 kg/m²     Current Shift:  No intake/output data recorded. Last three shifts:  12/07 1901 - 12/09 0700  In: -   Out: 550 [Urine:550]    Physical Exam:  General: WD, WN. Alert, cooperative, no acute distress    HEENT: NC, Atraumatic. PERRLA, anicteric sclerae. Lungs: Mild expiratory Wheezing, no Rhonchi/Rales. Heart:  Regular  rhythm,  No murmur, No Rubs, No Gallops  Abdomen: Soft, Non distended, Non tender. +Bowel sounds,   Extremities: No c/c/e  Psych:   Not anxious or agitated. Neurologic:  No acute neurological deficit. Labs: Results:       Chemistry Recent Labs     12/09/21 0347 12/08/21  0528 12/07/21  0920   * 195* 92    142 141   K 4.0 4.8 3.8    107 107   CO2 26 28 29   BUN 24* 17 6*   CREA 1.08 1.07 0.93   CA 8.6 8.7 8.5   AGAP 6 7 5   BUCR 22* 16 6*   AP  --   --  78   TP  --   --  6.6   ALB  --   --  2.8*   GLOB  --   --  3.8   AGRAT  --   --  0.7*      CBC w/Diff Recent Labs     12/09/21  0347 12/08/21  0528 12/07/21  0920   WBC 12.8 8.1 7.9   RBC 4.01* 4.09* 4.71   HGB 11.7* 12.2* 13.6   HCT 37.3 38.6 44.2    163 167   GRANS  --   --  62   LYMPH  --   --  23   EOS  --   --  7*      Cardiac Enzymes Recent Labs     12/07/21  0920   CPK 96   CKND1 CALCULATION NOT PERFORMED WHEN RESULT IS BELOW LINEAR LIMIT      Coagulation No results for input(s): PTP, INR, APTT, INREXT, INREXT in the last 72 hours.     Lipid Panel Lab Results   Component Value Date/Time    Cholesterol, total 188 04/03/2011 04:30 AM    HDL Cholesterol 89 (H) 04/03/2011 04:30 AM    LDL, calculated 87 04/03/2011 04:30 AM    VLDL, calculated 12 04/03/2011 04:30 AM    Triglyceride 60 04/03/2011 04:30 AM    CHOL/HDL Ratio 2.1 04/03/2011 04:30 AM      BNP No results for input(s): BNPP in the last 72 hours. Liver Enzymes Recent Labs     12/07/21  0920   TP 6.6   ALB 2.8*   AP 78      Thyroid Studies No results found for: T4, T3U, TSH, TSHEXT, TSHEXT     Procedures/imaging: see electronic medical records for all procedures/Xrays and details which were not copied into this note but were reviewed prior to creation of Plan    XR CHEST PORT    Result Date: 12/7/2021  EXAM:  PORTABLE CHEST INDICATION:  Shortness of breath. Cough. TECHNIQUE:  Portable, AP view, 2 films. COMPARISON:  11/23/2021 ____________________ FINDINGS:  SUPPORT DEVICES: None. HEART AND MEDIASTINUM: Cardiomediastinal silhouette appears within normal limits. Tortuous thoracic aorta. LUNGS AND PLEURAL SPACES: Persistent right basilar consolidation. Improving right upper lobe consolidation. No new consolidation. No pleural effusion or pneumothorax. BONY THORAX AND SOFT TISSUES: No acute osseous abnormality. ____________________     Persistent right basilar consolidation, atelectasis or pneumonia. Improving right upper lobe consolidation. *   *     XR CHEST PORT    Result Date: 11/23/2021  EXAM: XR CHEST PORT CLINICAL INDICATION/HISTORY: chest pain, sob, and/or arrhythmia -Additional: None COMPARISON: 11/2/2021 TECHNIQUE: Portable frontal view of the chest _______________ FINDINGS: SUPPORT DEVICES: None. HEART AND MEDIASTINUM: Cardiomediastinal silhouette within normal limits. LUNGS AND PLEURAL SPACES: Patchy right upper lung parenchymal infiltrates. No large effusion or pneumothorax. _______________     Patchy right upper lung parenchymal infiltrates.        Luke Rubio MD

## 2021-12-09 NOTE — DIABETES MGMT
Diabetes Patient/Family Education Record    Factors That May Influence Patients Ability to Learn or Comply with Recommendations   []   Language barrier    []   Cultural needs   [x]   Motivation    []   Cognitive limitation    []   Physical   []   Education    []   Physiological factors   []   Hearing/vision/speaking impairment   []   Amish beliefs    []   Financial factors   []  Other:   []  No factors identified at this time. Person Instructed:   [x]   Patient   []   Family   []  Other     Preference for Learning:   [x]   Verbal   []   Written   []  Demonstration     Level of Comprehension & Competence:    []  Good                                      [x] Fair                                     []  Poor                             []  Needs Reinforcement   []  Teach back completed    Education Component: Patient denies any history of pre-diabetes but acknolwedges his sugars always run very high when he is on steroids. He says his blood sugar is normal when he is not hospitalized.  Patient does not monitor his blood sugar at home however says he has access to a meter through the [facility].   []  Medication management, including how to administer insulin (if appropriate) and potential medication interactions    []  Nutritional management - [x] Obtained usual meal pattern   []   Basic carbohydrate counting  []  Plate method  []  Limit concentrated sweets and avoid sweetened beverages  []  Portion control  []    Avoid skipping meals   []  Exercise   [x]  Signs, symptoms, and treatment of hyperglycemia and hypoglycemia   [] Prevention, recognition and treatment of hyperglycemia and hypoglycemia   []  Importance of blood glucose monitoring  [] Blood Glucose targets   []   Provided patient with blood glucose meter  []  Has glucometer and supplies at home   []  Instruction on use of the blood glucose meter and recommended monitoring schedule   []  Discuss the importance of HbA1C monitoring. Patients A1c is ___ %.  This is equivalent to average glucose of ___ mg/dl for the past 2-3 months.   []  Sick day guidelines   []  Proper use and disposal of lancets, needles, syringes or insulin pens (if appropriate)   []  Potential long-term complications (retinopathy, kidney disease, neuropathy, foot care)   [] Information about whom to contact in case of emergency or for more information    []  Goal:  Patient/family will demonstrate understanding of Diabetes Self- Management Skills by: (date) _12/16______  Plan for post-discharge education or self-management support:    [] Outpatient class schedule provided            [] Patient Declined    [] Scheduled for outpatient classes (date) _______    [] Written information provided  Verify: [] Prior to admission Diabetes medications    Does patient understand how diabetes medications work? ______NA______________________  Does patient have difficulty obtaining diabetes medications or testing supplies? ___NA______________    Renetta Montanez, [address] [place] [address]  Glycemic Control Team  [phone]

## 2021-12-09 NOTE — DIABETES MGMT
Diabetes/ Glycemic Control Plan of Care  Recommendations:  1. Recommend to initiate 10 units Lantus  2. Very insulin resistant Humalog scale    Assessment: Patient with worsening hyperglycemia induced by steroids. POC glucose x last 24 hours: 245-357 mg/dl  DX:   1. SOB (shortness of breath)     2. COPD with acute exacerbation ([facility]     3. Cocaine abuse ([facility]        Steroids:   Rx Glucocorticoids (24h ago, onward)             Start     Dose Route Frequency Ordered Stop    12/09/21 1200  methylPREDNISolone (PF) (Solu-MEDROL) injection 60 mg         60 mg IV EVERY 6 HOURS 12/09/21 1132 --           Recent Glucose Results:   Lab Results   Component Value Date/Time     (H) 12/09/2021 03:47 AM    GLUCPOC 357 (H) 12/09/2021 11:20 AM    GLUCPOC 420 (HH) 12/09/2021 11:19 AM    GLUCPOC 245 (H) 12/09/2021 07:45 AM           Within target range (70-180mg/dL): No  Current insulin orders: Humalog ACHS  Total Daily Dose previous 24 hours = 16 units Humalog    Plan/Goals:   Blood glucose will be within target of 70 - 180 mg/dl within 72 hours  Reinforce dietary and medication compliance at home.         Education:  [x] Refer to Diabetes Education Record                       [] Education not indicated at this time     Edelmira Quintana, [address] [place] [address]  Glycemic Control Team  [phone]

## 2021-12-10 LAB
ANION GAP SERPL CALC-SCNC: 8 MMOL/L (ref 3–18)
BUN SERPL-MCNC: 25 MG/DL (ref 7–18)
BUN/CREAT SERPL: 27 (ref 12–20)
CALCIUM SERPL-MCNC: 8.5 MG/DL (ref 8.5–10.1)
CHLORIDE SERPL-SCNC: 109 MMOL/L (ref 100–111)
CO2 SERPL-SCNC: 26 MMOL/L (ref 21–32)
CREAT SERPL-MCNC: 0.91 MG/DL (ref 0.6–1.3)
ERYTHROCYTE [DISTWIDTH] IN BLOOD BY AUTOMATED COUNT: 15.6 % (ref 11.6–14.5)
GLUCOSE BLD STRIP.AUTO-MCNC: 278 MG/DL (ref 70–110)
GLUCOSE BLD STRIP.AUTO-MCNC: 311 MG/DL (ref 70–110)
GLUCOSE BLD STRIP.AUTO-MCNC: 341 MG/DL (ref 70–110)
GLUCOSE BLD STRIP.AUTO-MCNC: 343 MG/DL (ref 70–110)
GLUCOSE BLD STRIP.AUTO-MCNC: 519 MG/DL (ref 70–110)
GLUCOSE BLD STRIP.AUTO-MCNC: >600 MG/DL (ref 70–110)
GLUCOSE SERPL-MCNC: 210 MG/DL (ref 74–99)
HCT VFR BLD AUTO: 36.9 % (ref 36–48)
HGB BLD-MCNC: 11.9 G/DL (ref 13–16)
MCH RBC QN AUTO: 30.4 PG (ref 24–34)
MCHC RBC AUTO-ENTMCNC: 32.2 G/DL (ref 31–37)
MCV RBC AUTO: 94.1 FL (ref 78–100)
NRBC # BLD: 0 K/UL (ref 0–0.01)
NRBC BLD-RTO: 0 PER 100 WBC
PLATELET # BLD AUTO: 186 K/UL (ref 135–420)
PMV BLD AUTO: 10.4 FL (ref 9.2–11.8)
POTASSIUM SERPL-SCNC: 4.2 MMOL/L (ref 3.5–5.5)
RBC # BLD AUTO: 3.92 M/UL (ref 4.35–5.65)
SODIUM SERPL-SCNC: 143 MMOL/L (ref 136–145)
WBC # BLD AUTO: 12.1 K/UL (ref 4.6–13.2)

## 2021-12-10 PROCEDURE — 82962 GLUCOSE BLOOD TEST: CPT

## 2021-12-10 PROCEDURE — 74011250637 HC RX REV CODE- 250/637: Performed by: FAMILY MEDICINE

## 2021-12-10 PROCEDURE — 80048 BASIC METABOLIC PNL TOTAL CA: CPT

## 2021-12-10 PROCEDURE — 85027 COMPLETE CBC AUTOMATED: CPT

## 2021-12-10 PROCEDURE — 74011636637 HC RX REV CODE- 636/637: Performed by: FAMILY MEDICINE

## 2021-12-10 PROCEDURE — 94640 AIRWAY INHALATION TREATMENT: CPT

## 2021-12-10 PROCEDURE — 74011250636 HC RX REV CODE- 250/636: Performed by: FAMILY MEDICINE

## 2021-12-10 PROCEDURE — 74011000250 HC RX REV CODE- 250: Performed by: FAMILY MEDICINE

## 2021-12-10 PROCEDURE — 65270000029 HC RM PRIVATE

## 2021-12-10 PROCEDURE — 36415 COLL VENOUS BLD VENIPUNCTURE: CPT

## 2021-12-10 PROCEDURE — 74011250636 HC RX REV CODE- 250/636: Performed by: HOSPITALIST

## 2021-12-10 PROCEDURE — 74011000258 HC RX REV CODE- 258: Performed by: FAMILY MEDICINE

## 2021-12-10 RX ORDER — DOXYCYCLINE 100 MG/1
100 CAPSULE ORAL EVERY 12 HOURS
Status: DISCONTINUED | OUTPATIENT
Start: 2021-12-10 | End: 2021-12-12 | Stop reason: HOSPADM

## 2021-12-10 RX ADMIN — INSULIN LISPRO 12 UNITS: 100 INJECTION, SOLUTION INTRAVENOUS; SUBCUTANEOUS at 22:03

## 2021-12-10 RX ADMIN — DABIGATRAN ETEXILATE MESYLATE 150 MG: 75 CAPSULE ORAL at 21:42

## 2021-12-10 RX ADMIN — DOXYCYCLINE 100 MG: 100 INJECTION, POWDER, LYOPHILIZED, FOR SOLUTION INTRAVENOUS at 09:09

## 2021-12-10 RX ADMIN — INSULIN LISPRO 6 UNITS: 100 INJECTION, SOLUTION INTRAVENOUS; SUBCUTANEOUS at 09:08

## 2021-12-10 RX ADMIN — Medication 10 ML: at 05:24

## 2021-12-10 RX ADMIN — Medication 10 ML: at 16:00

## 2021-12-10 RX ADMIN — DABIGATRAN ETEXILATE MESYLATE 150 MG: 75 CAPSULE ORAL at 09:09

## 2021-12-10 RX ADMIN — IPRATROPIUM BROMIDE AND ALBUTEROL SULFATE 3 ML: .5; 3 SOLUTION RESPIRATORY (INHALATION) at 15:43

## 2021-12-10 RX ADMIN — METHYLPREDNISOLONE SODIUM SUCCINATE 60 MG: 125 INJECTION, POWDER, FOR SOLUTION INTRAMUSCULAR; INTRAVENOUS at 05:24

## 2021-12-10 RX ADMIN — IPRATROPIUM BROMIDE AND ALBUTEROL SULFATE 3 ML: .5; 3 SOLUTION RESPIRATORY (INHALATION) at 19:47

## 2021-12-10 RX ADMIN — MULTIPLE VITAMINS W/ MINERALS TAB 1 TABLET: TAB at 09:08

## 2021-12-10 RX ADMIN — BUDESONIDE 500 MCG: 0.5 INHALANT RESPIRATORY (INHALATION) at 19:47

## 2021-12-10 RX ADMIN — METHYLPREDNISOLONE SODIUM SUCCINATE 40 MG: 40 INJECTION, POWDER, FOR SOLUTION INTRAMUSCULAR; INTRAVENOUS at 16:10

## 2021-12-10 RX ADMIN — Medication 10 ML: at 22:04

## 2021-12-10 RX ADMIN — IPRATROPIUM BROMIDE AND ALBUTEROL SULFATE 3 ML: .5; 3 SOLUTION RESPIRATORY (INHALATION) at 02:26

## 2021-12-10 RX ADMIN — DOCUSATE SODIUM 100 MG: 100 CAPSULE ORAL at 09:09

## 2021-12-10 RX ADMIN — INSULIN LISPRO 12 UNITS: 100 INJECTION, SOLUTION INTRAVENOUS; SUBCUTANEOUS at 16:10

## 2021-12-10 RX ADMIN — ARIPIPRAZOLE 5 MG: 5 TABLET ORAL at 09:09

## 2021-12-10 RX ADMIN — DOXYCYCLINE 100 MG: 100 CAPSULE ORAL at 23:19

## 2021-12-10 RX ADMIN — BUDESONIDE 500 MCG: 0.5 INHALANT RESPIRATORY (INHALATION) at 08:08

## 2021-12-10 RX ADMIN — IPRATROPIUM BROMIDE AND ALBUTEROL SULFATE 3 ML: .5; 3 SOLUTION RESPIRATORY (INHALATION) at 08:08

## 2021-12-10 RX ADMIN — METHYLPREDNISOLONE SODIUM SUCCINATE 40 MG: 40 INJECTION, POWDER, FOR SOLUTION INTRAMUSCULAR; INTRAVENOUS at 22:02

## 2021-12-10 NOTE — DIABETES MGMT
Diabetes/ Glycemic Control Plan of Care  Recommendations:   Recommend to start basal insulin, suggest 10 units Lantus q 24 hours  Very insulin resistant Humalog scale per protocol  No concentrated sweets to aid in glycemic control     Assessment: Patient without known history of diabetes however most recent A1c is 6.6% (11/24/21) which may be in range for diabetes. POC glucose continues to be outside of target range (140-180 mg/dL). POC glucose ranging 210-317 mg/dl last 24 hours. Fasting BGL this morning 210 mg/dL. Recent Glucose Results:   Lab Results   Component Value Date/Time     (H) 12/10/2021 02:43 AM    GLUCPOC 278 (H) 12/10/2021 08:44 AM    GLUCPOC >600 (HH) 12/10/2021 08:42 AM    GLUCPOC 317 (H) 12/09/2021 09:31 PM           Within target range (70-180mg/dL): No  Current insulin orders: Humalog ACHS - normal scale  Total Daily Dose previous 24 hours = 32 units Humalog    Nutrition/Diet:   Active Orders   Diet    ADULT DIET Regular; No Concentrated Sweets      Plan/Goals:   Blood glucose will be within target of 70 - 180 mg/dl within 72 hours  Reinforce dietary and medication compliance at home.        Education:  [x] Refer to Diabetes Education Record                       [] Education not indicated at this time     Oracio Hernandez, [address] [place] [address]  Glycemic Control Team  [phone]

## 2021-12-10 NOTE — PROGRESS NOTES
D/C Plan: Home with physician follow up vs [facility] and physician follow up      Pt lives with his significant other and is independent. Pt has access to a rollator and a RW if needed. Pt drives himself to and from appointments. Pt has indicated he will need assistance with transport home when discharged (Lyft). Anticipate pt will transition home with physician follow up vs [facility] and physician follow up when medically stable. CM to continue to follow and assist.    Care Management Interventions  Mode of Transport at Discharge:  Other (see comment) (Lyft)  Health Maintenance Reviewed: Yes  Support Systems: Spouse/Significant Other, Other Family Member(s)  Confirm Follow Up Transport: Self  The Plan for Transition of Care is Related to the Following Treatment Goals : Home with physician follow up vs [facility] and physician follow up  Discharge Location  Discharge Placement: Home with family assistance

## 2021-12-10 NOTE — PROGRESS NOTES
Hospitalist Progress Note    Patient: Corina Olivera MRN: [id number]  CSN: [id number]    YOB: 1950  Age: 70 y.o. Sex: male    DOA: 12/7/2021 LOS:  LOS: 3 days          Chief Complaint:    SOB, expiratory wheezing     Assessment/Plan     [place] is a 70 y. o. male   with a past medical history of COPD/asthma not on home oxygen, cocaine abuse, hypertension, depression, NEIL on CPAP and noncompliance to medical treatment is  admitted for COPD exacerbation.      COPD exacerbation  Improving, still having expiratory wheezing -close to baseline, will likely DC tomorrow   Off nc oxygen now   Continue iv steroid -weaning      Cocaine use/abuse  Advised permanent cessation     Hypertension:   continue clonidine      NEIL on CPAP:   Nighttime CPAP     Noncompliance to medical treatment  education     Right basilar consolidation  Persistent right basilar consolidation, atelectasis or pneumonia. -  Improving right upper lobe consolidation. Continue doxycycline IV twice daily     Echocardiogram: Left Ventricle: Normal cavity size, wall thickness and systolic function (ejection fraction normal). The estimated EF is 60 - 65%. There is mild (grade 1) left ventricular diastolic dysfunction E/E' ratio = 9.66. Wall Scoring:  The left ventricular wall motion is normal. LV function has increased    Continue  pradaxa for chronic dvt , echo has improved      Disposition :1-2 days     Disposition :  Patient Active Problem List   Diagnosis Code    [facility] F14.10    Alcohol abuse F10.10    COPD (chronic obstructive pulmonary disease) ([facility] J44.9    COPD exacerbation ([facility] J44.1    Dyslipidemia E78.5    Gastroesophageal reflux disease without esophagitis K21.9    Problem related to housing and economic circumstances Z59.9    Vitamin D deficiency disease E55.9    Asthma J45.909    Chronic deep vein thrombosis (DVT) ([facility] I82.509    Obstructive sleep apnea G47.33    Major depression F32.9    Hypertension I10    Anxiety T22.3    Mild diastolic dysfunction Y56.7    AAA (abdominal aortic aneurysm) ([facility] I71.4    Acute respiratory failure with hypoxia ([facility]) J96.01    Pneumonia J18.9    COPD with acute exacerbation ([facility]) J44.1    Noncompliance with CPAP treatment Z91.14    Consolidation of right lower lobe of lung ([facility]) J18.1       Subjective:    Feeling better. Promises to never smoke cocaine again. Review of systems:    Constitutional: denies fevers, chills, myalgias  Respiratory: denies SOB, cough  Cardiovascular: denies chest pain, palpitations  Gastrointestinal: denies nausea, vomiting, diarrhea      Vital signs/Intake and Output:  Visit Vitals  /80   Pulse 78   Temp 97.7 °F (36.5 °C)   Resp 16   Ht 6' (1.829 m)   Wt 97.5 kg (215 lb)   SpO2 98%   BMI 29.16 kg/m²     Current Shift:  No intake/output data recorded. Last three shifts:  12/08 1901 - 12/10 0700  In: 220 [P.O.:120;  I.V.:100]  Out: 400 [Urine:400]    Exam:    General: Well developed, alert, NAD, OX3  Head/Neck: NCAT, supple, No masses, No lymphadenopathy  CVS:Regular rate and rhythm, no M/R/G, S1/S2 heard, no thrill  Lungs:Clear to auscultation bilaterally, no wheezes, rhonchi, or rales  Abdomen: Soft, Nontender, No distention, Normal Bowel sounds, No hepatomegaly  Extremities: No C/C/E, pulses palpable 2+  Skin:normal texture and turgor, no rashes, no lesions  Neuro:grossly normal , follows commands  Psych:appropriate                Labs: Results:       Chemistry Recent Labs     12/10/21  0243 12/09/21  0347 12/08/21  0528   * 258* 195*    140 142   K 4.2 4.0 4.8    108 107   CO2 26 26 28   BUN 25* 24* 17   CREA 0.91 1.08 1.07   CA 8.5 8.6 8.7   AGAP 8 6 7   BUCR 27* 22* 16      CBC w/Diff Recent Labs     12/10/21  0243 12/09/21  0347 12/08/21  0528   WBC 12.1 12.8 8.1   RBC 3.92* 4.01* 4.09*   HGB 11.9* 11.7* 12.2*   HCT 36.9 37.3 38.6    177 163      Cardiac Enzymes No results for input(s): CPK, CKND1, RENU in the last 72 hours. No lab exists for component: CKRMB, TROIP   Coagulation No results for input(s): PTP, INR, APTT, INREXT in the last 72 hours. Lipid Panel Lab Results   Component Value Date/Time    Cholesterol, total 188 04/03/2011 04:30 AM    HDL Cholesterol 89 (H) 04/03/2011 04:30 AM    LDL, calculated 87 04/03/2011 04:30 AM    VLDL, calculated 12 04/03/2011 04:30 AM    Triglyceride 60 04/03/2011 04:30 AM    CHOL/HDL Ratio 2.1 04/03/2011 04:30 AM      BNP No results for input(s): BNPP in the last 72 hours. Liver Enzymes No results for input(s): TP, ALB, TBIL, AP in the last 72 hours.     No lab exists for component: SGOT, GPT, DBIL   Thyroid Studies No results found for: T4, T3U, TSH, TSHEXT     Procedures/imaging: see electronic medical records for all procedures/Xrays and details which were not copied into this note but were reviewed prior to creation of Arleth Carson MD

## 2021-12-10 NOTE — PROGRESS NOTES
Problem: Diabetes Self-Management  Goal: *Disease process and treatment process  Description: Define diabetes and identify own type of diabetes; list 3 options for treating diabetes. Outcome: Progressing Towards Goal  Goal: *Incorporating nutritional management into lifestyle  Description: Describe effect of type, amount and timing of food on blood glucose; list 3 methods for planning meals. Outcome: Progressing Towards Goal  Goal: *Incorporating physical activity into lifestyle  Description: State effect of exercise on blood glucose levels. Outcome: Progressing Towards Goal  Goal: *Developing strategies to promote health/change behavior  Description: Define the ABC's of diabetes; identify appropriate screenings, schedule and personal plan for screenings. Outcome: Progressing Towards Goal  Goal: *Using medications safely  Description: State effect of diabetes medications on diabetes; name diabetes medication taking, action and side effects. Outcome: Progressing Towards Goal  Goal: *Monitoring blood glucose, interpreting and using results  Description: Identify recommended blood glucose targets  and personal targets. Outcome: Progressing Towards Goal  Goal: *Prevention, detection, treatment of acute complications  Description: List symptoms of hyper- and hypoglycemia; describe how to treat low blood sugar and actions for lowering  high blood glucose level. Outcome: Progressing Towards Goal  Goal: *Prevention, detection and treatment of chronic complications  Description: Define the natural course of diabetes and describe the relationship of blood glucose levels to long term complications of diabetes.   Outcome: Progressing Towards Goal  Goal: *Developing strategies to address psychosocial issues  Description: Describe feelings about living with diabetes; identify support needed and support network  Outcome: Progressing Towards Goal  Goal: *Insulin pump training  Outcome: Progressing Towards Goal  Goal: *Sick day guidelines  Outcome: Progressing Towards Goal  Goal: *Patient Specific Goal (EDIT GOAL, INSERT TEXT)  Outcome: Progressing Towards Goal     Problem: Falls - Risk of  Goal: *Absence of Falls  Description: Document Atif Fall Risk and appropriate interventions in the flowsheet.   Outcome: Progressing Towards Goal  Note: Fall Risk Interventions:            Medication Interventions: Patient to call before getting OOB

## 2021-12-11 PROBLEM — E11.65 HYPERGLYCEMIA DUE TO TYPE 2 DIABETES MELLITUS (HCC): Status: ACTIVE | Noted: 2021-12-11

## 2021-12-11 LAB
GLUCOSE BLD STRIP.AUTO-MCNC: 182 MG/DL (ref 70–110)
GLUCOSE BLD STRIP.AUTO-MCNC: 207 MG/DL (ref 70–110)
GLUCOSE BLD STRIP.AUTO-MCNC: 218 MG/DL (ref 70–110)
GLUCOSE BLD STRIP.AUTO-MCNC: 275 MG/DL (ref 70–110)

## 2021-12-11 PROCEDURE — 65270000029 HC RM PRIVATE

## 2021-12-11 PROCEDURE — 74011250637 HC RX REV CODE- 250/637: Performed by: FAMILY MEDICINE

## 2021-12-11 PROCEDURE — 74011000250 HC RX REV CODE- 250: Performed by: FAMILY MEDICINE

## 2021-12-11 PROCEDURE — 94640 AIRWAY INHALATION TREATMENT: CPT

## 2021-12-11 PROCEDURE — 74011250636 HC RX REV CODE- 250/636: Performed by: FAMILY MEDICINE

## 2021-12-11 PROCEDURE — 74011636637 HC RX REV CODE- 636/637: Performed by: FAMILY MEDICINE

## 2021-12-11 PROCEDURE — 82962 GLUCOSE BLOOD TEST: CPT

## 2021-12-11 RX ADMIN — ARIPIPRAZOLE 5 MG: 5 TABLET ORAL at 09:45

## 2021-12-11 RX ADMIN — DABIGATRAN ETEXILATE MESYLATE 150 MG: 75 CAPSULE ORAL at 22:04

## 2021-12-11 RX ADMIN — DABIGATRAN ETEXILATE MESYLATE 150 MG: 75 CAPSULE ORAL at 09:46

## 2021-12-11 RX ADMIN — DULOXETINE HYDROCHLORIDE 20 MG: 20 CAPSULE, DELAYED RELEASE ORAL at 09:45

## 2021-12-11 RX ADMIN — INSULIN LISPRO 6 UNITS: 100 INJECTION, SOLUTION INTRAVENOUS; SUBCUTANEOUS at 09:46

## 2021-12-11 RX ADMIN — METHYLPREDNISOLONE SODIUM SUCCINATE 40 MG: 40 INJECTION, POWDER, FOR SOLUTION INTRAMUSCULAR; INTRAVENOUS at 09:45

## 2021-12-11 RX ADMIN — INSULIN LISPRO 6 UNITS: 100 INJECTION, SOLUTION INTRAVENOUS; SUBCUTANEOUS at 12:04

## 2021-12-11 RX ADMIN — Medication 10 ML: at 14:00

## 2021-12-11 RX ADMIN — IPRATROPIUM BROMIDE AND ALBUTEROL SULFATE 3 ML: .5; 3 SOLUTION RESPIRATORY (INHALATION) at 12:06

## 2021-12-11 RX ADMIN — BUDESONIDE 500 MCG: 0.5 INHALANT RESPIRATORY (INHALATION) at 10:54

## 2021-12-11 RX ADMIN — Medication 10 ML: at 16:18

## 2021-12-11 RX ADMIN — DOCUSATE SODIUM 100 MG: 100 CAPSULE ORAL at 09:45

## 2021-12-11 RX ADMIN — IPRATROPIUM BROMIDE AND ALBUTEROL SULFATE 3 ML: .5; 3 SOLUTION RESPIRATORY (INHALATION) at 02:48

## 2021-12-11 RX ADMIN — IPRATROPIUM BROMIDE AND ALBUTEROL SULFATE 3 ML: .5; 3 SOLUTION RESPIRATORY (INHALATION) at 20:30

## 2021-12-11 RX ADMIN — DOXYCYCLINE 100 MG: 100 CAPSULE ORAL at 22:04

## 2021-12-11 RX ADMIN — INSULIN LISPRO 9 UNITS: 100 INJECTION, SOLUTION INTRAVENOUS; SUBCUTANEOUS at 16:23

## 2021-12-11 RX ADMIN — INSULIN LISPRO 3 UNITS: 100 INJECTION, SOLUTION INTRAVENOUS; SUBCUTANEOUS at 21:59

## 2021-12-11 RX ADMIN — METHYLPREDNISOLONE SODIUM SUCCINATE 40 MG: 40 INJECTION, POWDER, FOR SOLUTION INTRAMUSCULAR; INTRAVENOUS at 03:31

## 2021-12-11 RX ADMIN — DOXYCYCLINE 100 MG: 100 CAPSULE ORAL at 09:45

## 2021-12-11 RX ADMIN — METHYLPREDNISOLONE SODIUM SUCCINATE 60 MG: 40 INJECTION, POWDER, FOR SOLUTION INTRAMUSCULAR; INTRAVENOUS at 22:00

## 2021-12-11 RX ADMIN — MULTIPLE VITAMINS W/ MINERALS TAB 1 TABLET: TAB at 09:45

## 2021-12-11 RX ADMIN — METHYLPREDNISOLONE SODIUM SUCCINATE 40 MG: 40 INJECTION, POWDER, FOR SOLUTION INTRAMUSCULAR; INTRAVENOUS at 16:19

## 2021-12-11 RX ADMIN — BUDESONIDE 500 MCG: 0.5 INHALANT RESPIRATORY (INHALATION) at 20:30

## 2021-12-11 RX ADMIN — Medication 10 ML: at 22:00

## 2021-12-11 NOTE — PROGRESS NOTES
Pt refused antibiotic. States that it burns. Offered to run with NS pt states,  \"the doctor told me that it will be changed to the pill\". Waiting for response from on call doctor.

## 2021-12-11 NOTE — PROGRESS NOTES
Duo neb given per patient request. No acute distress noted, breath sounds minimal expiratory wheezing with no change after neb. . SPO2  94% on room air. RN aware.

## 2021-12-11 NOTE — PROGRESS NOTES
Hospitalist Progress Note    Patient: Zoltan Norwood MRN: [id number]  CSN: [id number]    YOB: 1950  Age: 70 y.o. Sex: male    DOA: 12/7/2021 LOS:  LOS: 4 days          Chief Complaint:    SOB, expiratory wheezing     Assessment/Plan     [place]aña is a 70 y. o. male   with a past medical history of COPD/asthma not on home oxygen, cocaine abuse, hypertension, depression, NEIL on CPAP and noncompliance to medical treatment is  admitted for COPD exacerbation.      COPD exacerbation  Still not at [facility] and having WREN just from walking a few feet to thr BR  Consider pulmonary consult for expertise in managing this exacerbation   Still Off nc oxygen   Continue iv steroid -weaning     Hyperglycemia will on steroids -  SSI, add mealtime insulin     Cocaine use/abuse  Advised permanent cessation     Hypertension:   continue clonidine      NEIL on CPAP:   Nighttime CPAP     Noncompliance to medical treatment  education     Right basilar consolidation  Persistent right basilar consolidation, atelectasis or pneumonia. -  Improving right upper lobe consolidation. Switch to po doxycycline twice daily     Echocardiogram: Left Ventricle: Normal cavity size, wall thickness and systolic function (ejection fraction normal). The estimated EF is 60 - 65%. There is mild (grade 1) left ventricular diastolic dysfunction E/E' ratio = 9.66. Wall Scoring:  The left ventricular wall motion is normal. LV function has increased    Continue  pradaxa for chronic dvt , echo has improved     Disposition : TBD  Patient Active Problem List   Diagnosis Code    Cocaine abuse ([facility] F14.10    Alcohol abuse F10.10    COPD (chronic obstructive pulmonary disease) ([facility] J44.9    COPD exacerbation ([facility] J44.1    Dyslipidemia E78.5    Gastroesophageal reflux disease without esophagitis K21.9    Problem related to housing and economic circumstances Z59.9    Vitamin D deficiency disease E55.9    Asthma J45.909    Chronic deep vein thrombosis (DVT) ([facility]) I82.509    Obstructive sleep apnea G47.33    Major depression F32.9    Hypertension I10    Anxiety M22.5    Mild diastolic dysfunction X72.3    AAA (abdominal aortic aneurysm) ([facility]) I71.4    Acute respiratory failure with hypoxia ([facility]) J96.01    Pneumonia J18.9    COPD with acute exacerbation ([facility]) J44.1    Noncompliance with CPAP treatment Z91.14    Consolidation of right lower lobe of lung ([facility]) J18.1       Subjective:    Watched pt walk back to his bed after going to the  to have a bowel movement and he was WREN, SOB and using accessory muscles. Improved as he rested. Review of systems:    Constitutional: denies fevers, chills, myalgias  Respiratory: denies SOB, cough  Cardiovascular: denies chest pain, palpitations  Gastrointestinal: denies nausea, vomiting, diarrhea      Vital signs/Intake and Output:  Visit Vitals  /84   Pulse 75   Temp 97.8 °F (36.6 °C)   Resp 18   Ht 6' (1.829 m)   Wt 97.5 kg (215 lb)   SpO2 94%   BMI 29.16 kg/m²     Current Shift:  12/11 0701 - 12/11 1900  In: 480 [P.O.:480]  Out: 450 [Urine:450]  Last three shifts:  12/09 1901 - 12/11 0700  In: 700 [P.O.:600;  I.V.:100]  Out: 1575 [Urine:1575]    Exam:    General: Well developed, alert, NAD, OX3  Head/Neck: NCAT, supple, No masses, No lymphadenopathy  CVS:Regular rate and rhythm, no M/R/G, S1/S2 heard, no thrill  Lungs:+expiratory wheezes all throughout   Abdomen: Soft, Nontender, No distention, Normal Bowel sounds, No hepatomegaly  Extremities: No C/C/E, pulses palpable 2+  Skin:normal texture and turgor, no rashes, no lesions  Neuro:grossly normal , follows commands  Psych:appropriate                Labs: Results:       Chemistry Recent Labs     12/10/21  0243 12/09/21  0347   * 258*    140   K 4.2 4.0    108   CO2 26 26   BUN 25* 24*   CREA 0.91 1.08   CA 8.5 8.6   AGAP 8 6   BUCR 27* 22*      CBC w/Diff Recent Labs     12/10/21  0243 12/09/21  0347   WBC 12.1 12.8   RBC 3.92* 4.01*   HGB 11.9* 11.7*   HCT 36.9 37.3    177      Cardiac Enzymes No results for input(s): CPK, CKND1, RENU in the last 72 hours. No lab exists for component: CKRMB, TROIP   Coagulation No results for input(s): PTP, INR, APTT, INREXT, INREXT in the last 72 hours. Lipid Panel Lab Results   Component Value Date/Time    Cholesterol, total 188 04/03/2011 04:30 AM    HDL Cholesterol 89 (H) 04/03/2011 04:30 AM    LDL, calculated 87 04/03/2011 04:30 AM    VLDL, calculated 12 04/03/2011 04:30 AM    Triglyceride 60 04/03/2011 04:30 AM    CHOL/HDL Ratio 2.1 04/03/2011 04:30 AM      BNP No results for input(s): BNPP in the last 72 hours. Liver Enzymes No results for input(s): TP, ALB, TBIL, AP in the last 72 hours.     No lab exists for component: SGOT, GPT, DBIL   Thyroid Studies No results found for: T4, T3U, TSH, TSHEXT, TSHEXT     Procedures/imaging: see electronic medical records for all procedures/Xrays and details which were not copied into this note but were reviewed prior to creation of Sheryl Stacy MD

## 2021-12-11 NOTE — PROGRESS NOTES
Bedside and verbal shift change report given to Todd Farah RN (on coming nurse) by Lexi Correa RN (off going nurse). Report included the following information SBAR, Kardex, OR Summary, Intake/Output and MAR. Bedside and verbal shift change report given by Todd Farah RN (off going nurse) to ANGELIKA RAYO(on coming nurse). Report included the following information SBAR, Kardex, OR Summary, Intake/Output and MAR.

## 2021-12-11 NOTE — PROGRESS NOTES
Problem: Diabetes Self-Management  Goal: *Disease process and treatment process  Description: Define diabetes and identify own type of diabetes; list 3 options for treating diabetes. Outcome: Progressing Towards Goal  Goal: *Incorporating nutritional management into lifestyle  Description: Describe effect of type, amount and timing of food on blood glucose; list 3 methods for planning meals. Outcome: Progressing Towards Goal  Goal: *Incorporating physical activity into lifestyle  Description: State effect of exercise on blood glucose levels. Outcome: Progressing Towards Goal  Goal: *Developing strategies to promote health/change behavior  Description: Define the ABC's of diabetes; identify appropriate screenings, schedule and personal plan for screenings. Outcome: Progressing Towards Goal  Goal: *Using medications safely  Description: State effect of diabetes medications on diabetes; name diabetes medication taking, action and side effects. Outcome: Progressing Towards Goal  Goal: *Monitoring blood glucose, interpreting and using results  Description: Identify recommended blood glucose targets  and personal targets. Outcome: Progressing Towards Goal  Goal: *Prevention, detection, treatment of acute complications  Description: List symptoms of hyper- and hypoglycemia; describe how to treat low blood sugar and actions for lowering  high blood glucose level. Outcome: Progressing Towards Goal  Goal: *Prevention, detection and treatment of chronic complications  Description: Define the natural course of diabetes and describe the relationship of blood glucose levels to long term complications of diabetes.   Outcome: Progressing Towards Goal  Goal: *Developing strategies to address psychosocial issues  Description: Describe feelings about living with diabetes; identify support needed and support network  Outcome: Progressing Towards Goal  Goal: *Insulin pump training  Outcome: Progressing Towards Goal  Goal: *Sick day guidelines  Outcome: Progressing Towards Goal  Goal: *Patient Specific Goal (EDIT GOAL, INSERT TEXT)  Outcome: Progressing Towards Goal     Problem: Patient Education: Go to Patient Education Activity  Goal: Patient/Family Education  Outcome: Progressing Towards Goal     Problem: Falls - Risk of  Goal: *Absence of Falls  Description: Document Manuel Cease Fall Risk and appropriate interventions in the flowsheet. Outcome: Progressing Towards Goal  Note: Fall Risk Interventions:            Medication Interventions: Patient to call before getting OOB                   Problem: Patient Education: Go to Patient Education Activity  Goal: Patient/Family Education  Outcome: Progressing Towards Goal     Problem: Diabetes Self-Management  Goal: *Disease process and treatment process  Description: Define diabetes and identify own type of diabetes; list 3 options for treating diabetes. Outcome: Progressing Towards Goal  Goal: *Incorporating nutritional management into lifestyle  Description: Describe effect of type, amount and timing of food on blood glucose; list 3 methods for planning meals. Outcome: Progressing Towards Goal  Goal: *Incorporating physical activity into lifestyle  Description: State effect of exercise on blood glucose levels. Outcome: Progressing Towards Goal  Goal: *Developing strategies to promote health/change behavior  Description: Define the ABC's of diabetes; identify appropriate screenings, schedule and personal plan for screenings. Outcome: Progressing Towards Goal  Goal: *Using medications safely  Description: State effect of diabetes medications on diabetes; name diabetes medication taking, action and side effects. Outcome: Progressing Towards Goal  Goal: *Monitoring blood glucose, interpreting and using results  Description: Identify recommended blood glucose targets  and personal targets.   Outcome: Progressing Towards Goal  Goal: *Prevention, detection, treatment of acute complications  Description: List symptoms of hyper- and hypoglycemia; describe how to treat low blood sugar and actions for lowering  high blood glucose level. Outcome: Progressing Towards Goal  Goal: *Prevention, detection and treatment of chronic complications  Description: Define the natural course of diabetes and describe the relationship of blood glucose levels to long term complications of diabetes. Outcome: Progressing Towards Goal  Goal: *Developing strategies to address psychosocial issues  Description: Describe feelings about living with diabetes; identify support needed and support network  Outcome: Progressing Towards Goal  Goal: *Insulin pump training  Outcome: Progressing Towards Goal  Goal: *Sick day guidelines  Outcome: Progressing Towards Goal  Goal: *Patient Specific Goal (EDIT GOAL, INSERT TEXT)  Outcome: Progressing Towards Goal     Problem: Patient Education: Go to Patient Education Activity  Goal: Patient/Family Education  Outcome: Progressing Towards Goal     Problem: Falls - Risk of  Goal: *Absence of Falls  Description: Document Atif Fall Risk and appropriate interventions in the flowsheet.   Outcome: Progressing Towards Goal  Note: Fall Risk Interventions:            Medication Interventions: Patient to call before getting OOB                   Problem: Patient Education: Go to Patient Education Activity  Goal: Patient/Family Education  Outcome: Progressing Towards Goal

## 2021-12-12 VITALS
SYSTOLIC BLOOD PRESSURE: 152 MMHG | HEIGHT: 72 IN | OXYGEN SATURATION: 98 % | WEIGHT: 215 LBS | DIASTOLIC BLOOD PRESSURE: 94 MMHG | RESPIRATION RATE: 18 BRPM | BODY MASS INDEX: 29.12 KG/M2 | HEART RATE: 78 BPM | TEMPERATURE: 97.8 F

## 2021-12-12 LAB
GLUCOSE BLD STRIP.AUTO-MCNC: 199 MG/DL (ref 70–110)
GLUCOSE BLD STRIP.AUTO-MCNC: 214 MG/DL (ref 70–110)
GLUCOSE BLD STRIP.AUTO-MCNC: 242 MG/DL (ref 70–110)

## 2021-12-12 PROCEDURE — 74011250637 HC RX REV CODE- 250/637: Performed by: FAMILY MEDICINE

## 2021-12-12 PROCEDURE — 74011250636 HC RX REV CODE- 250/636: Performed by: FAMILY MEDICINE

## 2021-12-12 PROCEDURE — 74011636637 HC RX REV CODE- 636/637: Performed by: FAMILY MEDICINE

## 2021-12-12 PROCEDURE — 74011000250 HC RX REV CODE- 250: Performed by: FAMILY MEDICINE

## 2021-12-12 PROCEDURE — 82962 GLUCOSE BLOOD TEST: CPT

## 2021-12-12 PROCEDURE — 94640 AIRWAY INHALATION TREATMENT: CPT

## 2021-12-12 RX ORDER — DOXYCYCLINE 100 MG/1
100 CAPSULE ORAL EVERY 12 HOURS
Qty: 10 CAPSULE | Refills: 0 | Status: SHIPPED | OUTPATIENT
Start: 2021-12-12 | End: 2021-12-17

## 2021-12-12 RX ORDER — PREDNISONE 10 MG/1
TABLET ORAL
Qty: 30 TABLET | Refills: 0 | OUTPATIENT
Start: 2021-12-12 | End: 2021-12-21

## 2021-12-12 RX ADMIN — METHYLPREDNISOLONE SODIUM SUCCINATE 60 MG: 40 INJECTION, POWDER, FOR SOLUTION INTRAMUSCULAR; INTRAVENOUS at 09:32

## 2021-12-12 RX ADMIN — ARIPIPRAZOLE 5 MG: 5 TABLET ORAL at 09:33

## 2021-12-12 RX ADMIN — DOXYCYCLINE 100 MG: 100 CAPSULE ORAL at 09:33

## 2021-12-12 RX ADMIN — METHYLPREDNISOLONE SODIUM SUCCINATE 60 MG: 40 INJECTION, POWDER, FOR SOLUTION INTRAMUSCULAR; INTRAVENOUS at 03:47

## 2021-12-12 RX ADMIN — INSULIN LISPRO 6 UNITS: 100 INJECTION, SOLUTION INTRAVENOUS; SUBCUTANEOUS at 16:30

## 2021-12-12 RX ADMIN — BUDESONIDE 500 MCG: 0.5 INHALANT RESPIRATORY (INHALATION) at 08:49

## 2021-12-12 RX ADMIN — DABIGATRAN ETEXILATE MESYLATE 150 MG: 75 CAPSULE ORAL at 09:33

## 2021-12-12 RX ADMIN — MULTIPLE VITAMINS W/ MINERALS TAB 1 TABLET: TAB at 09:33

## 2021-12-12 RX ADMIN — INSULIN LISPRO 3 UNITS: 100 INJECTION, SOLUTION INTRAVENOUS; SUBCUTANEOUS at 09:32

## 2021-12-12 RX ADMIN — DOCUSATE SODIUM 100 MG: 100 CAPSULE ORAL at 09:33

## 2021-12-12 RX ADMIN — METHYLPREDNISOLONE SODIUM SUCCINATE 60 MG: 40 INJECTION, POWDER, FOR SOLUTION INTRAMUSCULAR; INTRAVENOUS at 17:07

## 2021-12-12 RX ADMIN — DULOXETINE HYDROCHLORIDE 20 MG: 20 CAPSULE, DELAYED RELEASE ORAL at 09:33

## 2021-12-12 RX ADMIN — INSULIN LISPRO 6 UNITS: 100 INJECTION, SOLUTION INTRAVENOUS; SUBCUTANEOUS at 11:30

## 2021-12-12 RX ADMIN — Medication 10 ML: at 06:47

## 2021-12-12 NOTE — PROGRESS NOTES
Assumed patient care at this time. Received report from [facility] NATHANIEL. Assessment complete and documented in flowsheets. 1525-Nurse paged Dr. Chung Elizabeth for patient stating that his girlfriend is here and wanting to see about discharge.

## 2021-12-12 NOTE — PROGRESS NOTES
Bedside and Verbal shift change report given to Maryuri GREEN. Navi Garner Dr. (oncoming nurse) by Mary Mo RN (offgoing nurse). Report included the following information SBAR, Kardex, MAR and Recent Results. 0

## 2021-12-12 NOTE — PROGRESS NOTES
Problem: Diabetes Self-Management  Goal: *Disease process and treatment process  Description: Define diabetes and identify own type of diabetes; list 3 options for treating diabetes. Outcome: Progressing Towards Goal  Goal: *Incorporating nutritional management into lifestyle  Description: Describe effect of type, amount and timing of food on blood glucose; list 3 methods for planning meals. Outcome: Progressing Towards Goal  Goal: *Incorporating physical activity into lifestyle  Description: State effect of exercise on blood glucose levels. Outcome: Progressing Towards Goal  Goal: *Developing strategies to promote health/change behavior  Description: Define the ABC's of diabetes; identify appropriate screenings, schedule and personal plan for screenings. Outcome: Progressing Towards Goal  Goal: *Using medications safely  Description: State effect of diabetes medications on diabetes; name diabetes medication taking, action and side effects. Outcome: Progressing Towards Goal  Goal: *Monitoring blood glucose, interpreting and using results  Description: Identify recommended blood glucose targets  and personal targets. Outcome: Progressing Towards Goal  Goal: *Prevention, detection, treatment of acute complications  Description: List symptoms of hyper- and hypoglycemia; describe how to treat low blood sugar and actions for lowering  high blood glucose level. Outcome: Progressing Towards Goal  Goal: *Prevention, detection and treatment of chronic complications  Description: Define the natural course of diabetes and describe the relationship of blood glucose levels to long term complications of diabetes.   Outcome: Progressing Towards Goal  Goal: *Developing strategies to address psychosocial issues  Description: Describe feelings about living with diabetes; identify support needed and support network  Outcome: Progressing Towards Goal  Goal: *Insulin pump training  Outcome: Progressing Towards Goal  Goal: *Sick day guidelines  Outcome: Progressing Towards Goal  Goal: *Patient Specific Goal (EDIT GOAL, INSERT TEXT)  Outcome: Progressing Towards Goal     Problem: Patient Education: Go to Patient Education Activity  Goal: Patient/Family Education  Outcome: Progressing Towards Goal     Problem: Falls - Risk of  Goal: *Absence of Falls  Description: Document Wiyot Cease Fall Risk and appropriate interventions in the flowsheet.   Outcome: Progressing Towards Goal  Note: Fall Risk Interventions:            Medication Interventions: Patient to call before getting OOB, Teach patient to arise slowly                   Problem: Patient Education: Go to Patient Education Activity  Goal: Patient/Family Education  Outcome: Progressing Towards Goal

## 2021-12-12 NOTE — DISCHARGE SUMMARY
Discharge Summary    Patient: Casey Wiggins MRN: [id number]  CSN: [id number]    YOB: 1950  Age: 70 y.o.   Sex: male    DOA: 12/7/2021 LOS:  LOS: 5 days   Discharge Date:      Primary Care Provider:  Edgar Rey MD    Admission Diagnoses: COPD with acute exacerbation [facility]) [J44.1]    Discharge Diagnoses:    Problem List as of 12/12/2021 Date Reviewed: 12/14/2015          Codes Class Noted - Resolved    Hyperglycemia due to type 2 diabetes mellitus ([facility] ICD-10-CM: E11.65  ICD-9-CM: 250.00  12/11/2021 - Present        * (Principal) COPD with acute exacerbation ([facility] ICD-10-CM: J44.1  ICD-9-CM: 491.21  12/7/2021 - Present        Noncompliance with CPAP treatment ICD-10-CM: Z91.14  ICD-9-CM: V15.81  12/7/2021 - Present        Consolidation of right lower lobe of lung ([facility] ICD-10-CM: J18.1  ICD-9-CM: 922  12/7/2021 - Present        Acute respiratory failure with hypoxia ([facility] ICD-10-CM: J96.01  ICD-9-CM: 518.81  11/23/2021 - Present        Pneumonia ICD-10-CM: J18.9  ICD-9-CM: 486  11/23/2021 - Present        AAA (abdominal aortic aneurysm) ([facility] ICD-10-CM: I71.4  ICD-9-CM: 441.4  6/8/2018 - Present        Vitamin D deficiency disease ICD-10-CM: E55.9  ICD-9-CM: 268.9  2/9/2017 - Present        Asthma ICD-10-CM: J45.909  ICD-9-CM: 493.90  Unknown - Present        Chronic deep vein thrombosis (DVT) ([facility] ICD-10-CM: I82.509  ICD-9-CM: 453.50  Unknown - Present        Obstructive sleep apnea ICD-10-CM: G47.33  ICD-9-CM: 327.23  Unknown - Present        Major depression ICD-10-CM: F32.9  ICD-9-CM: 296.20  Unknown - Present        Hypertension ICD-10-CM: I10  ICD-9-CM: 401.9  Unknown - Present        Anxiety ICD-10-CM: F41.9  ICD-9-CM: 300.00  Unknown - Present        Mild diastolic dysfunction YXM-49-BV: I51.9  ICD-9-CM: 429.9  Unknown - Present        COPD (chronic obstructive pulmonary disease) ([facility] (Chronic) ICD-10-CM: J44.9  ICD-9-CM: 533  11/22/2016 - Present    Overview Addendum 6/25/2018 3:56 PM by Donzella Closs     Overview:   5/2013: FVC 40%, FEV1 38%,m ratio 74%, +BD response, +air trapping  Prev required chronic prednisone 10mg daily  Overview:   5/2013: FVC 40%, FEV1 38%,m ratio 74%, +BD response, +air trapping  Prev required chronic prednisone 10mg daily             COPD exacerbation (HCC) ICD-10-CM: J44.1  ICD-9-CM: 491.21  11/22/2016 - Present        Gastroesophageal reflux disease without esophagitis ICD-10-CM: K21.9  ICD-9-CM: 530.81  7/6/2016 - Present        Problem related to housing and economic circumstances ICD-10-CM: Z59.9  ICD-9-CM: V60.9  2/14/2016 - Present        Cocaine abuse (HCC) (Chronic) ICD-10-CM: F14.10  ICD-9-CM: 305.60  12/13/2015 - Present        Alcohol abuse (Chronic) ICD-10-CM: F10.10  ICD-9-CM: 305.00  12/13/2015 - Present        Dyslipidemia ICD-10-CM: E78.5  ICD-9-CM: 272.4  1/6/2015 - Present        RESOLVED: Depression ICD-10-CM: F32. A  ICD-9-CM: 527  8/3/2021 - 8/3/2021        RESOLVED: Hyperlipidemia ICD-10-CM: E78.5  ICD-9-CM: 272.4  8/3/2021 - 8/3/2021        RESOLVED: GERD (gastroesophageal reflux disease) ICD-10-CM: K21.9  ICD-9-CM: 530.81  8/3/2021 - 8/3/2021        RESOLVED: Essential hypertension ICD-10-CM: I10  ICD-9-CM: 401.9  2/9/2017 - 8/3/2021        RESOLVED: Major depression, recurrent ([facility] ICD-10-CM: F33.9  ICD-9-CM: 296.30  12/13/2015 - 11/22/2016        RESOLVED: Cocaine abuse with cocaine-induced mood disorder ([facility] ICD-10-CM: F14.14  ICD-9-CM: 292.84  12/13/2015 - 11/22/2016        RESOLVED: Bronchitis ICD-10-CM: J40  ICD-9-CM: 772  Unknown - 11/22/2016              Discharge Medications:     Current Discharge Medication List      START taking these medications    Details   doxycycline (MONODOX) 100 mg capsule Take 1 Capsule by mouth every twelve (12) hours for 10 doses. Qty: 10 Capsule, Refills: 0  Start date: 12/12/2021, End date: 12/17/2021      predniSONE (DELTASONE) 10 mg tablet Take 4 tablets p.o. daily x4 days, take 3 tablets p.o. daily x3 days, take 2 tablets p.o. daily x2 days, take 1 tablet p.o. daily x1 day  Qty: 30 Tablet, Refills: 0  Start date: 12/12/2021         CONTINUE these medications which have NOT CHANGED    Details   therapeutic multivitamin-minerals (THERAGRAN-M) tablet Take 1 Tab by Mouth Once a Day. ARIPiprazole (ABILIFY) 5 mg tablet Take 1 Tab by mouth daily. Indications: DEPRESSION TREATMENT ADJUNCT  Qty: 30 Tab, Refills: 1      albuterol (PROVENTIL HFA, VENTOLIN HFA, PROAIR HFA) 90 mcg/actuation inhaler Take 2 Puffs by inhalation every four (4) hours as needed for Wheezing or Shortness of Breath (Cough). Qty: 1 Inhaler, Refills: 0      SPIRIVA WITH HANDIHALER 18 mcg inhalation capsule Take 1 Cap by inhalation daily. 2/1/17 Dr. Geovani Edwards      omeprazole (PRILOSEC) 20 mg capsule Take 1 Cap by mouth two (2) times a day. 2/1/17, QTY#60, 30 Days. 2/1/17 Dr. Geovani Edwards      budesonide-formoterol [facility]) 160-4.5 mcg/actuation HFA inhaler Take 2 Puffs by inhalation two (2) times a day. albuterol-ipratropium (DUONEB) 2.5 mg-0.5 mg/3 ml nebu 3 mL by Nebulization route every six (6) hours as needed. DULoxetine (CYMBALTA) 20 mg capsule 20 mg.      dabigatran etexilate (PRADAXA) 150 mg capsule Take 1 Cap by mouth every twelve (12) hours.  Indications: PREVENT THROMBOEMBOLISM IN CHRONIC ATRIAL FIBRILLATION, PREVENTION OF DEEP VEIN THROMBOSIS RECURRENCE  Qty: 60 Cap, Refills: 1      inhalational spacing device ALWAYS USE WITH INHALER  Qty: 1 Device, Refills: 0         STOP taking these medications       predniSONE (STERAPRED DS) 10 mg dose pack Comments:   Reason for Stopping:         azithromycin (ZITHROMAX) 500 mg tab Comments:   Reason for Stopping:         traMADol (ULTRAM) 50 mg tablet Comments:   Reason for Stopping:         STOOL SOFTENER 100 mg capsule Comments:   Reason for Stopping:             Discharge Condition: improved     Procedures : none    Consults: None     PHYSICAL EXAM   Visit Vitals  BP (!) 152/94 (BP 1 Location: Left upper arm, BP Patient Position: At rest)   Pulse 78   Temp 97.8 °F (36.6 °C)   Resp 18   Ht 6' (1.829 m)   Wt 97.5 kg (215 lb)   SpO2 98%   BMI 29.16 kg/m²     General: Awake, cooperative, no acute distress    HEENT: NC, Atraumatic. PERRLA, EOMI. Anicteric sclerae. Lungs:  CTA Bilaterally. Scattering wheezing, rhonchi, rales. Heart:  Regular  rhythm,  No murmur, No Rubs, No Gallops  Abdomen: Soft, Non distended, Non tender. +Bowel sounds,   Extremities: No c/c/e  Psych:   Not anxious or agitated. Neurologic:  No acute neurological deficits. Admission HPI : Zoltan Norwood is a 70 y.o. male   with a past medical history of COPD/asthma not on home oxygen, cocaine abuse, hypertension, depression, NEIL on CPAP but at last admission had issues with his CPAP and was not wearing it properly, noncompliance to medical treatment presents to the emergency room for shortness of breath, chest tightness nonproductive dry cough and wheezing that has been gradually worsening over the last 1 to 2 weeks. He states that he has been using his albuterol inhaler and his DuoNeb nebulizer at home without much improvement of his symptoms. When he arrived in the emergency room he just uses DuoNeb about an hour prior. Apparently he still had a prednisone taper and was still taking tapering doses of that medication. He was given Solu-Medrol, nebs and magnesium in the ED and attempt to try to wake him of his current exacerbation and sent him home however patient was unable to ambulate without significant respiratory distress. Can ambulate past 25 feet. As he tried to walk that distance in the emergency room he would have to come back and sit down. Patient feels like his prior COPD exacerbations. He has received 2 doses of COVID-19 vaccine. He endorsed to the ED team that he is continuing to use crack cocaine which he smokes and he used it approximately 3 to 7 days ago.   He is otherwise not smoking. Denies fever chills or night sweats. Also denies nausea vomiting diarrhea or chest pain. He is afebrile and nonhypoxic in the emergency room. His chest x-ray shows a right basilar consolidation that is persistent as atelectasis versus pneumonia. His right upper lobe consolidation has improved since last x-ray. I am being asked to admit this patient for COPD exacerbation. Hospital Course : [facility]il [facility] a 70 y. o. male   with a past medical history of COPD/asthma not on home oxygen, cocaine abuse, hypertension, depression, NEIL on CPAP and noncompliance to medical treatment is  admitted for COPD exacerbation.      COPD exacerbation -  Improved, close to BL  DC home on steroid taper     Hyperglycemia will on steroids -  SSI, add mealtime insulin     Cocaine use/abuse  Advised permanent cessation     Hypertension:   continue clonidine      NEIL on CPAP:   PLEASE USE CPAP AS ORDERED  Patient has medication nonadherence to using his CPAP and may be a exacerbating factor in his COPD exacerbations     Noncompliance to medical treatment     Right basilar consolidation  Persistent right basilar consolidation, atelectasis or pneumonia. -  Improving right upper lobe consolidation. Switch to po doxycycline twice daily     Echocardiogram: Left Ventricle: Normal cavity size, wall thickness and systolic function (ejection fraction normal). The estimated EF is 60 - 65%. There is mild (grade 1) left ventricular diastolic dysfunction E/E' ratio = 9.66. Wall Scoring:  The left ventricular wall motion is normal. LV function has increased     Continue  pradaxa for chronic dvt , echo has improved     Activity: as tolerated    Diet: cardiac    Follow-up: with pulmonology in 7-10 days     Disposition: Home     Minutes spent on discharge: 45 minutes       Labs: Results:       Chemistry Recent Labs     12/10/21  0243   *      K 4.2      CO2 26   BUN 25*   CREA 0.91   CA 8.5   AGAP 8 BUCR 27*      CBC w/Diff Recent Labs     12/10/21  0243   WBC 12.1   RBC 3.92*   HGB 11.9*   HCT 36.9         Cardiac Enzymes No results for input(s): CPK, CKND1, RENU in the last 72 hours. No lab exists for component: CKRMB, TROIP   Coagulation No results for input(s): PTP, INR, APTT, INREXT, INREXT in the last 72 hours. Lipid Panel Lab Results   Component Value Date/Time    Cholesterol, total 188 04/03/2011 04:30 AM    HDL Cholesterol 89 (H) 04/03/2011 04:30 AM    LDL, calculated 87 04/03/2011 04:30 AM    VLDL, calculated 12 04/03/2011 04:30 AM    Triglyceride 60 04/03/2011 04:30 AM    CHOL/HDL Ratio 2.1 04/03/2011 04:30 AM      BNP No results for input(s): BNPP in the last 72 hours. Liver Enzymes No results for input(s): TP, ALB, TBIL, AP in the last 72 hours. No lab exists for component: SGOT, GPT, DBIL   Thyroid Studies No results found for: T4, T3U, TSH, TSHEXT, TSHEXT         Significant Diagnostic Studies: XR CHEST PORT    Result Date: 12/7/2021  EXAM:  PORTABLE CHEST INDICATION:  Shortness of breath. Cough. TECHNIQUE:  Portable, AP view, 2 films. COMPARISON:  11/23/2021 ____________________ FINDINGS:  SUPPORT DEVICES: None. HEART AND MEDIASTINUM: Cardiomediastinal silhouette appears within normal limits. Tortuous thoracic aorta. LUNGS AND PLEURAL SPACES: Persistent right basilar consolidation. Improving right upper lobe consolidation. No new consolidation. No pleural effusion or pneumothorax. BONY THORAX AND SOFT TISSUES: No acute osseous abnormality. ____________________     Persistent right basilar consolidation, atelectasis or pneumonia. Improving right upper lobe consolidation. *   *     XR CHEST PORT    Result Date: 11/23/2021  EXAM: XR CHEST PORT CLINICAL INDICATION/HISTORY: chest pain, sob, and/or arrhythmia -Additional: None COMPARISON: 11/2/2021 TECHNIQUE: Portable frontal view of the chest _______________ FINDINGS: SUPPORT DEVICES: None.  HEART AND MEDIASTINUM: Cardiomediastinal silhouette within normal limits. LUNGS AND PLEURAL SPACES: Patchy right upper lung parenchymal infiltrates. No large effusion or pneumothorax. _______________     Patchy right upper lung parenchymal infiltrates. ECHO ADULT COMPLETE    Result Date: 12/9/2021  · Left Ventricle: Normal cavity size, wall thickness and systolic function (ejection fraction normal). The estimated EF is 60 - 65%. There is mild (grade 1) left ventricular diastolic dysfunction E/E' ratio = 9.66. Wall Scoring: The left ventricular wall motion is normal. · Tricuspid Valve: Normal valve structure and no stenosis. Tricuspid regurgitation is inadequate for estimation of right ventricular systolic pressure. No results found for this or any previous visit.         CC: Asher Serna MD

## 2021-12-12 NOTE — PROGRESS NOTES
1900 Report received, assumed care of patient. 2000 Assessment completed and noted. Plan of care reviewed, pt verbalized understanding. NAD, VSS, call bell within reach, will cont to monitor. 2200 Pt accepted scheduled medications as ordered (see MAR).      0000 RT at bedside, pt refuses CPAP    0700 Report given to Donell Degroot

## 2021-12-12 NOTE — DISCHARGE INSTRUCTIONS
DISCHARGE SUMMARY from Nurse    PATIENT INSTRUCTIONS:    After general anesthesia or intravenous sedation, for 24 hours or while taking prescription Narcotics:  · Limit your activities  · Do not drive and operate hazardous machinery  · Do not make important personal or business decisions  · Do  not drink alcoholic beverages  · If you have not urinated within 8 hours after discharge, please contact your surgeon on call. Report the following to your surgeon:  · Excessive pain, swelling, redness or odor of or around the surgical area  · Temperature over 100.5  · Nausea and vomiting lasting longer than 4 hours or if unable to take medications  · Any signs of decreased circulation or nerve impairment to extremity: change in color, persistent  numbness, tingling, coldness or increase pain  · Any questions    What to do at Home:  Recommended activity: Activity as tolerated. If you experience any of the following symptoms fever, chills, shortness of breath, dry cough, please follow up with your primary care physician or local ED. *  Please give a list of your current medications to your Primary Care Provider. *  Please update this list whenever your medications are discontinued, doses are      changed, or new medications (including over-the-counter products) are added. *  Please carry medication information at all times in case of emergency situations. These are general instructions for a healthy lifestyle:    No smoking/ No tobacco products/ Avoid exposure to second hand smoke  Surgeon General's Warning:  Quitting smoking now greatly reduces serious risk to your health.     Obesity, smoking, and sedentary lifestyle greatly increases your risk for illness    A healthy diet, regular physical exercise & weight monitoring are important for maintaining a healthy lifestyle    You may be retaining fluid if you have a history of heart failure or if you experience any of the following symptoms:  Weight gain of 3 pounds or more overnight or 5 pounds in a week, increased swelling in our hands or feet or shortness of breath while lying flat in bed. Please call your doctor as soon as you notice any of these symptoms; do not wait until your next office visit. Patient armband removed and shredded. The discharge information has been reviewed with the patient. The patient verbalized understanding. Discharge medications reviewed with the patient and appropriate educational materials and side effects teaching were provided.   ___________________________________________________________________________________________________________________________________

## 2021-12-14 NOTE — PROGRESS NOTES
Physician Progress Note      PATIENT:               Pinky Casas  CSN #:                  [id number]  :                       1950  ADMIT DATE:       2021 9:05 AM  [address] DATE:        2021 6:30 PM  RESPONDING  PROVIDER #:        Misti CORADO MD          QUERY TEXT:    Pt admitted with COPD exacerbation. Pt noted to have Persistent right basilar consolidation, atelectasis or pneumonia with pneumonia on the patient active problem list. If possible, please document in the progress notes and discharge summary if you are evaluating and/or treating any of the following:    Note: CAP and HCAP indicate where the pneumonia was acquired, not a specific type. The medical record reflects the following:    Risk Factors: COPD, Cocaine abuse, NEIL, Nicotine dependence, NEIL on CPAP, noncompliant with medical treatment    Clinical Indicators:  >  chest x-ray- Persistent right basilar consolidation, atelectasis or pneumonia.  > Pneumonia documented on the patient active problem list  > RR / 25, 26, 29  > Breath sounds of scattered wheezing documented by nursing    Treatment: Receiving SoluMedrol, IV Doxycycline, supplemental O2    Thank you,  Rocky Marie, RN, BSN, [facility]  [phone]  Options provided:  -- Gram negative pneumonia  -- Gram positive pneumonia  -- Bacterial pneumonia  -- Aspiration pneumonia  -- Hypostatic pneumonia  -- Other - I will add my own diagnosis  -- Disagree - Not applicable / Not valid  -- Disagree - Clinically unable to determine / Unknown  -- Refer to Clinical Documentation Reviewer    PROVIDER RESPONSE TEXT:    Provider is clinically unable to determine a response to this query.     Query created by: Kasey Bennett on 12/10/2021 3:27 PM      Electronically signed by:  Misti Ibarra MD 2021 5:51 PM

## 2021-12-21 ENCOUNTER — APPOINTMENT (OUTPATIENT)
Dept: GENERAL RADIOLOGY | Age: 71
End: 2021-12-21
Attending: EMERGENCY MEDICINE
Payer: OTHER GOVERNMENT

## 2021-12-21 ENCOUNTER — HOSPITAL ENCOUNTER (EMERGENCY)
Age: 71
Discharge: HOME | End: 2021-12-21
Attending: EMERGENCY MEDICINE
Payer: OTHER GOVERNMENT

## 2021-12-21 VITALS
TEMPERATURE: 97.8 F | HEART RATE: 89 BPM | OXYGEN SATURATION: 96 % | HEIGHT: 73 IN | RESPIRATION RATE: 27 BRPM | DIASTOLIC BLOOD PRESSURE: 70 MMHG | BODY MASS INDEX: 27.83 KG/M2 | WEIGHT: 210 LBS | SYSTOLIC BLOOD PRESSURE: 117 MMHG

## 2021-12-21 DIAGNOSIS — J44.1 COPD EXACERBATION (HCC): Primary | ICD-10-CM

## 2021-12-21 LAB
ALBUMIN SERPL-MCNC: 2.7 G/DL (ref 3.4–5)
ALBUMIN/GLOB SERPL: 0.7 {RATIO} (ref 0.8–1.7)
ALP SERPL-CCNC: 82 U/L (ref 45–117)
ALT SERPL-CCNC: 27 U/L (ref 16–61)
ANION GAP SERPL CALC-SCNC: 5 MMOL/L (ref 3–18)
AST SERPL-CCNC: 11 U/L (ref 10–38)
ATRIAL RATE: 97 BPM
BASOPHILS # BLD: 0 K/UL (ref 0–0.1)
BASOPHILS NFR BLD: 0 % (ref 0–2)
BILIRUB SERPL-MCNC: 1.5 MG/DL (ref 0.2–1)
BNP SERPL-MCNC: 52 PG/ML (ref 0–900)
BUN SERPL-MCNC: 6 MG/DL (ref 7–18)
BUN/CREAT SERPL: 6 (ref 12–20)
CALCIUM SERPL-MCNC: 8.2 MG/DL (ref 8.5–10.1)
CALCULATED P AXIS, ECG09: 78 DEGREES
CALCULATED R AXIS, ECG10: 57 DEGREES
CALCULATED T AXIS, ECG11: 77 DEGREES
CHLORIDE SERPL-SCNC: 108 MMOL/L (ref 100–111)
CO2 SERPL-SCNC: 29 MMOL/L (ref 21–32)
CREAT SERPL-MCNC: 1.07 MG/DL (ref 0.6–1.3)
DIAGNOSIS, 93000: NORMAL
DIFFERENTIAL METHOD BLD: ABNORMAL
EOSINOPHIL # BLD: 0.2 K/UL (ref 0–0.4)
EOSINOPHIL NFR BLD: 2 % (ref 0–5)
ERYTHROCYTE [DISTWIDTH] IN BLOOD BY AUTOMATED COUNT: 16.4 % (ref 11.6–14.5)
GLOBULIN SER CALC-MCNC: 3.9 G/DL (ref 2–4)
GLUCOSE SERPL-MCNC: 137 MG/DL (ref 74–99)
HCT VFR BLD AUTO: 44.9 % (ref 36–48)
HGB BLD-MCNC: 13.9 G/DL (ref 13–16)
IMM GRANULOCYTES # BLD AUTO: 0 K/UL (ref 0–0.04)
IMM GRANULOCYTES NFR BLD AUTO: 0 % (ref 0–0.5)
LYMPHOCYTES # BLD: 1.4 K/UL (ref 0.9–3.6)
LYMPHOCYTES NFR BLD: 18 % (ref 21–52)
MCH RBC QN AUTO: 29.4 PG (ref 24–34)
MCHC RBC AUTO-ENTMCNC: 31 G/DL (ref 31–37)
MCV RBC AUTO: 95.1 FL (ref 78–100)
MONOCYTES # BLD: 0.5 K/UL (ref 0.05–1.2)
MONOCYTES NFR BLD: 6 % (ref 3–10)
NEUTS SEG # BLD: 5.5 K/UL (ref 1.8–8)
NEUTS SEG NFR BLD: 73 % (ref 40–73)
NRBC # BLD: 0 K/UL (ref 0–0.01)
NRBC BLD-RTO: 0 PER 100 WBC
P-R INTERVAL, ECG05: 138 MS
PLATELET # BLD AUTO: 166 K/UL (ref 135–420)
PMV BLD AUTO: 9.7 FL (ref 9.2–11.8)
POTASSIUM SERPL-SCNC: 3.7 MMOL/L (ref 3.5–5.5)
PROT SERPL-MCNC: 6.6 G/DL (ref 6.4–8.2)
Q-T INTERVAL, ECG07: 356 MS
QRS DURATION, ECG06: 82 MS
QTC CALCULATION (BEZET), ECG08: 452 MS
RBC # BLD AUTO: 4.72 M/UL (ref 4.35–5.65)
SODIUM SERPL-SCNC: 142 MMOL/L (ref 136–145)
TROPONIN-HIGH SENSITIVITY: 7 NG/L (ref 0–78)
VENTRICULAR RATE, ECG03: 97 BPM
WBC # BLD AUTO: 7.5 K/UL (ref 4.6–13.2)

## 2021-12-21 PROCEDURE — 80053 COMPREHEN METABOLIC PANEL: CPT

## 2021-12-21 PROCEDURE — 71045 X-RAY EXAM CHEST 1 VIEW: CPT

## 2021-12-21 PROCEDURE — 85025 COMPLETE CBC W/AUTO DIFF WBC: CPT

## 2021-12-21 PROCEDURE — 83880 ASSAY OF NATRIURETIC PEPTIDE: CPT

## 2021-12-21 PROCEDURE — 74011000250 HC RX REV CODE- 250: Performed by: EMERGENCY MEDICINE

## 2021-12-21 PROCEDURE — 96374 THER/PROPH/DIAG INJ IV PUSH: CPT

## 2021-12-21 PROCEDURE — 94640 AIRWAY INHALATION TREATMENT: CPT

## 2021-12-21 PROCEDURE — 84484 ASSAY OF TROPONIN QUANT: CPT

## 2021-12-21 PROCEDURE — 99284 EMERGENCY DEPT VISIT MOD MDM: CPT

## 2021-12-21 PROCEDURE — 93005 ELECTROCARDIOGRAM TRACING: CPT

## 2021-12-21 PROCEDURE — 74011250636 HC RX REV CODE- 250/636: Performed by: EMERGENCY MEDICINE

## 2021-12-21 RX ORDER — DOXYCYCLINE HYCLATE 100 MG
100 TABLET ORAL 2 TIMES DAILY
Qty: 14 TABLET | Refills: 0 | Status: SHIPPED | OUTPATIENT
Start: 2021-12-21 | End: 2021-12-28

## 2021-12-21 RX ORDER — DOXYCYCLINE HYCLATE 100 MG
100 TABLET ORAL 2 TIMES DAILY
Qty: 14 TABLET | Refills: 0 | Status: SHIPPED | OUTPATIENT
Start: 2021-12-21 | End: 2021-12-21 | Stop reason: SDUPTHER

## 2021-12-21 RX ORDER — PREDNISONE 50 MG/1
50 TABLET ORAL DAILY
Qty: 5 TABLET | Refills: 0 | Status: SHIPPED | OUTPATIENT
Start: 2021-12-21 | End: 2021-12-21 | Stop reason: SDUPTHER

## 2021-12-21 RX ORDER — IPRATROPIUM BROMIDE AND ALBUTEROL SULFATE 2.5; .5 MG/3ML; MG/3ML
3 SOLUTION RESPIRATORY (INHALATION)
Status: COMPLETED | OUTPATIENT
Start: 2021-12-21 | End: 2021-12-21

## 2021-12-21 RX ORDER — PREDNISONE 50 MG/1
50 TABLET ORAL DAILY
Qty: 5 TABLET | Refills: 0 | Status: SHIPPED | OUTPATIENT
Start: 2021-12-21 | End: 2021-12-28

## 2021-12-21 RX ADMIN — METHYLPREDNISOLONE SODIUM SUCCINATE 125 MG: 125 INJECTION, POWDER, FOR SOLUTION INTRAMUSCULAR; INTRAVENOUS at 11:14

## 2021-12-21 RX ADMIN — IPRATROPIUM BROMIDE AND ALBUTEROL SULFATE 3 ML: .5; 3 SOLUTION RESPIRATORY (INHALATION) at 10:40

## 2021-12-21 NOTE — ED TRIAGE NOTES
P on room airatient ambulatory per ems to medic. Has copd and had shortness of breath was given duoneb.  In route to ed he came from home

## 2021-12-21 NOTE — ED NOTES
Bedside report received from [place], [facility].

## 2021-12-21 NOTE — ED NOTES
12:39 PM  12/21/21     Discharge instructions given to Mingo Degroot (name) with verbalization of understanding. Patient accompanied by self. Patient discharged with the following prescriptions Doxycycline. Patient discharged to home (destination).       Michael Pedroza RN

## 2021-12-21 NOTE — ED PROVIDER NOTES
EMERGENCY DEPARTMENT HISTORY AND PHYSICAL EXAM    Date: 12/21/2021  Patient Name: Suzette Murillo    History of Presenting Illness     Chief Complaint   Patient presents with    Shortness of Breath         History Provided By: Patient    Suzette Murillo is a 70 y.o. male with PMHX of Anxiety, asthma, COPD, ongoing cocaine use, hyperlipidemia, hypertension presents for evaluation of shortness of breath. Patient states that he has been short of breath over the last 6 weeks. He was recently admitted to the hospital for COPD exacerbation and was subsequently discharged on the eighth. Patient did use cocaine 3 days ago. He reportedly has been compliant with his medications. Patient otherwise denies any associated fever. On EMS arrival, patient was found to be ambulatory without evidence of hypoxia. DuoNeb was administered. PCP: Maksim Soliman MD    Current Outpatient Medications   Medication Sig Dispense Refill    doxycycline (VIBRA-TABS) 100 mg tablet Take 1 Tablet by mouth two (2) times a day for 7 days. 14 Tablet 0    predniSONE (DELTASONE) 50 mg tablet Take 1 Tablet by mouth daily for 5 days. 5 Tablet 0    DULoxetine (CYMBALTA) 20 mg capsule 20 mg.      therapeutic multivitamin-minerals (THERAGRAN-M) tablet Take 1 Tab by Mouth Once a Day.  ARIPiprazole (ABILIFY) 5 mg tablet Take 1 Tab by mouth daily. Indications: DEPRESSION TREATMENT ADJUNCT 30 Tab 1    dabigatran etexilate (PRADAXA) 150 mg capsule Take 1 Cap by mouth every twelve (12) hours. Indications: PREVENT THROMBOEMBOLISM IN CHRONIC ATRIAL FIBRILLATION, PREVENTION OF DEEP VEIN THROMBOSIS RECURRENCE 60 Cap 1    albuterol (PROVENTIL HFA, VENTOLIN HFA, PROAIR HFA) 90 mcg/actuation inhaler Take 2 Puffs by inhalation every four (4) hours as needed for Wheezing or Shortness of Breath (Cough). 1 Inhaler 0    SPIRIVA WITH HANDIHALER 18 mcg inhalation capsule Take 1 Cap by inhalation daily.  2/1/17 Dr. Domingo Mullen      omeprazole (PRILOSEC) 20 mg capsule Take 1 Cap by mouth two (2) times a day. 2/1/17, QTY#60, 30 Days. 2/1/17 Dr. Rebekah Bueno budesonide-formoterol [facility]) 160-4.5 mcg/actuation HFA inhaler Take 2 Puffs by inhalation two (2) times a day.  albuterol-ipratropium (DUONEB) 2.5 mg-0.5 mg/3 ml nebu 3 mL by Nebulization route every six (6) hours as needed.  inhalational spacing device ALWAYS USE WITH INHALER 1 Device 0       Past History     Past Medical History:  Past Medical History:   Diagnosis Date    Alcohol abuse     Anxiety     Asthma     Bronchitis     Chronic deep vein thrombosis (DVT) (HCC) 07/2013 07/2013, 12/2014 (LLE)    Cocaine abuse ([facility] [address]     COPD     Depression     GERD (gastroesophageal reflux disease)     Hyperlipidemia     Hypertension     Major depression     Mild diastolic dysfunction     NEIL on CPAP     Renal insufficiency     Suicidal ideation     Vitamin D deficiency        Past Surgical History:  Past Surgical History:   Procedure Laterality Date    HX HIP REPLACEMENT  08/2010    HX OTHER SURGICAL  06/2018    ENDOVASCULAR ANEURYSM REPAIR       Family History:  History reviewed. No pertinent family history. Social History:  Social History     Tobacco Use    Smoking status: Former Smoker    Smokeless tobacco: Never Used    Tobacco comment: States that he quit 12 years ago. Substance Use Topics    Alcohol use: Not Currently    Drug use: Yes     Types: Cocaine     Comment: 3-4 days ago       Allergies: Allergies   Allergen Reactions    Shellfish Derived Anaphylaxis         Review of Systems   Review of Systems   Constitutional: Negative for activity change and fever. HENT: Negative for congestion and sore throat. Eyes: Negative for discharge. Respiratory: Positive for shortness of breath. Negative for apnea. Cardiovascular: Negative for chest pain. Gastrointestinal: Negative for abdominal distention. Genitourinary: Negative for dysuria and flank pain. Musculoskeletal: Negative for arthralgias. Skin: Negative for rash. Neurological: Negative for dizziness and weakness. Hematological: Negative for adenopathy. Psychiatric/Behavioral: Negative for agitation. All other systems reviewed and are negative.         Physical Exam     Vitals:    12/21/21 1030 12/21/21 1034 12/21/21 1057 12/21/21 1124   BP: (!) 144/79  137/81 117/70   Pulse: 97   89   Resp: 25   27   Temp: 97.8 °F (36.6 °C)      SpO2: 96% 99% 99% 96%   Weight: 95.3 kg (210 lb)      Height: 6' 1\" (1.854 m)        Physical Exam    Nursing notes and vital signs reviewed     Constitutional: Non toxic appearing, moderate distress  Head: Normocephalic, Atraumatic  Eyes: EOMI  Neck: Supple  Cardiovascular: Regular rate and rhythm, no murmurs, rubs, or gallops  Chest: Normal work of breathing and chest excursion bilaterally  Lungs: Expiratory wheeze bilaterally with no evidence of respiratory distress  Abdomen: Soft, non tender, non distended, normoactive bowel sounds  Back: No evidence of trauma or deformity  Extremities: No evidence of trauma or deformity, no LE edema  Skin: Warm and dry, normal cap refill  Neuro: Alert and appropriate, CN intact, normal speech, strength and sensation full and symmetric bilaterally, normal gait, normal coordination  Psychiatric: Normal mood and affect      Diagnostic Study Results     Labs -     Recent Results (from the past 12 hour(s))   CBC WITH AUTOMATED DIFF    Collection Time: 12/21/21 10:32 AM   Result Value Ref Range    WBC 7.5 4.6 - 13.2 K/uL    RBC 4.72 4.35 - 5.65 M/uL    HGB 13.9 13.0 - 16.0 g/dL    HCT 44.9 36.0 - 48.0 %    MCV 95.1 78.0 - 100.0 FL    MCH 29.4 24.0 - 34.0 PG    MCHC 31.0 31.0 - 37.0 g/dL    RDW 16.4 (H) 11.6 - 14.5 %    PLATELET 537 572 - 818 K/uL    MPV 9.7 9.2 - 11.8 FL    NRBC 0.0 0  WBC    ABSOLUTE NRBC 0.00 0.00 - 0.01 K/uL    NEUTROPHILS 73 40 - 73 %    LYMPHOCYTES 18 (L) 21 - 52 %    MONOCYTES 6 3 - 10 %    EOSINOPHILS 2 0 - 5 %    BASOPHILS 0 0 - 2 %    IMMATURE GRANULOCYTES 0 0.0 - 0.5 %    ABS. NEUTROPHILS 5.5 1.8 - 8.0 K/UL    ABS. LYMPHOCYTES 1.4 0.9 - 3.6 K/UL    ABS. MONOCYTES 0.5 0.05 - 1.2 K/UL    ABS. EOSINOPHILS 0.2 0.0 - 0.4 K/UL    ABS. BASOPHILS 0.0 0.0 - 0.1 K/UL    ABS. IMM. GRANS. 0.0 0.00 - 0.04 K/UL    DF AUTOMATED     METABOLIC PANEL, COMPREHENSIVE    Collection Time: 12/21/21 10:32 AM   Result Value Ref Range    Sodium 142 136 - 145 mmol/L    Potassium 3.7 3.5 - 5.5 mmol/L    Chloride 108 100 - 111 mmol/L    CO2 29 21 - 32 mmol/L    Anion gap 5 3.0 - 18 mmol/L    Glucose 137 (H) 74 - 99 mg/dL    BUN 6 (L) 7.0 - 18 MG/DL    Creatinine 1.07 0.6 - 1.3 MG/DL    BUN/Creatinine ratio 6 (L) 12 - 20      GFR est AA >60 >60 ml/min/1.73m2    GFR est non-AA >60 >60 ml/min/1.73m2    Calcium 8.2 (L) 8.5 - 10.1 MG/DL    Bilirubin, total 1.5 (H) 0.2 - 1.0 MG/DL    ALT (SGPT) 27 16 - 61 U/L    AST (SGOT) 11 10 - 38 U/L    Alk. phosphatase 82 45 - 117 U/L    Protein, total 6.6 6.4 - 8.2 g/dL    Albumin 2.7 (L) 3.4 - 5.0 g/dL    Globulin 3.9 2.0 - 4.0 g/dL    A-G Ratio 0.7 (L) 0.8 - 1.7     TROPONIN-HIGH SENSITIVITY    Collection Time: 12/21/21 10:32 AM   Result Value Ref Range    Troponin-High Sensitivity 7 0 - 78 ng/L   NT-PRO BNP    Collection Time: 12/21/21 10:32 AM   Result Value Ref Range    NT pro-BNP 52 0 - 900 PG/ML       Radiologic Studies -   XR CHEST PORT   Final Result      Right mid to upper lung infiltrate. CT Results  (Last 48 hours)    None        CXR Results  (Last 48 hours)               12/21/21 1044  XR CHEST PORT Final result    Impression:      Right mid to upper lung infiltrate. Narrative:  EXAM: XR CHEST PORT       CLINICAL INDICATION/HISTORY: shortness of breath   -Additional: None       COMPARISON: 12/7/2021       TECHNIQUE: Frontal view of the chest       _______________       FINDINGS:       HEART AND MEDIASTINUM: Normal cardiac size and mediastinal contours.        LUNGS AND PLEURAL SPACES: Right mid to upper lung infiltrate. No  pneumothorax   or pleural effusion. BONY THORAX AND SOFT TISSUES: No acute osseous abnormality       _______________                 Medications given in the ED-  Medications   albuterol-ipratropium (DUO-NEB) 2.5 MG-0.5 MG/3 ML (3 mL Nebulization Given 12/21/21 1040)   methylPREDNISolone (PF) (Solu-MEDROL) injection 125 mg (125 mg IntraVENous Given 12/21/21 1114)         Medical Decision Making   I am the first provider for this patient. I reviewed the vital signs, available nursing notes, past medical history, past surgical history, family history and social history. Vital Signs-Reviewed the patient's vital signs. Pulse Oximetry Analysis - 99% on room air, not hypoxic     Cardiac Monitor:  Rate: 97 bpm  Rhythm: Sinus rhythm    EKG interpretation: (Preliminary)  EKG read by Dr. Reilly Solorio at 1030   Normal sinus rhythm, rate 97  Normal Intervals, , QRS 82, QTc 452  No ST elevation or T wave inversions    Records Reviewed: Old Medical Records    Provider Notes (Medical Decision Making): Tez Lay is a 51-year-old male with known history of COPD presents for evaluation of shortness of breath. On arrival patient is found to have minimal expiratory wheeze bilaterally with no retractions, respiratory distress or hypoxia. Patient states that he has taken his steroids at home, though question medical compliance, Solu-Medrol administered with additional DuoNeb. CBC, BMP, troponin, chest x-ray obtained. Chest x-ray read as possible right mid and upper lobe infiltrate, however in comparison to previous chest x-ray there is no overt evidence of pneumonia. Following DuoNeb, patient continues to be breathing comfortably with no respiratory distress, hypoxia with significant improvement in wheezing.   Do feel that patient is stable for discharge at this time, though recognize that patient is high risk for representation, short course of doxycycline, prednisone sent to the pharmacy. Advised patient to refrain from cocaine use. Patient states that he does have his inhalers at home. All questions were answered, patient discharged in stable condition. Procedures:  Procedures    ED Course:       Diagnosis and Disposition       DISCHARGE NOTE:    Brando Magaña's  results have been reviewed with him. He has been counseled regarding his diagnosis, treatment, and plan. He verbally conveys understanding and agreement of the signs, symptoms, diagnosis, treatment and prognosis and additionally agrees to follow up as discussed. He also agrees with the care-plan and conveys that all of his questions have been answered. I have also provided discharge instructions for him that include: educational information regarding their diagnosis and treatment, and list of reasons why they would want to return to the ED prior to their follow-up appointment, should his condition change. He has been provided with education for proper emergency department utilization. CLINICAL IMPRESSION:    1. COPD exacerbation ([facility] [address]        PLAN:  1. D/C Home  2. Discharge Medication List as of 12/21/2021 12:10 PM      START taking these medications    Details   doxycycline (VIBRA-TABS) 100 mg tablet Take 1 Tablet by mouth two (2) times a day for 7 days. , Normal, Disp-14 Tablet, R-0         CONTINUE these medications which have CHANGED    Details   predniSONE (DELTASONE) 50 mg tablet Take 1 Tablet by mouth daily for 5 days. , Normal, Disp-5 Tablet, R-0         CONTINUE these medications which have NOT CHANGED    Details   DULoxetine (CYMBALTA) 20 mg capsule 20 mg., Historical Med      therapeutic multivitamin-minerals (THERAGRAN-M) tablet Take 1 Tab by Mouth Once a Day., Historical Med      ARIPiprazole (ABILIFY) 5 mg tablet Take 1 Tab by mouth daily.  Indications: DEPRESSION TREATMENT ADJUNCT, Print, Disp-30 Tab, R-1      dabigatran etexilate (PRADAXA) 150 mg capsule Take 1 Cap by mouth every twelve (12) hours. Indications: PREVENT THROMBOEMBOLISM IN CHRONIC ATRIAL FIBRILLATION, PREVENTION OF DEEP VEIN THROMBOSIS RECURRENCE, Print, Disp-60 Cap, R-1      albuterol (PROVENTIL HFA, VENTOLIN HFA, PROAIR HFA) 90 mcg/actuation inhaler Take 2 Puffs by inhalation every four (4) hours as needed for Wheezing or Shortness of Breath (Cough). , Print, Disp-1 Inhaler, R-0      SPIRIVA WITH HANDIHALER 18 mcg inhalation capsule Take 1 Cap by inhalation daily. 2/1/17 Dr. Domingo Mullen, Historical Med, DERRICK      omeprazole (PRILOSEC) 20 mg capsule Take 1 Cap by mouth two (2) times a day. 2/1/17, QTY#60, 30 Days. 2/1/17 Dr. Domingo Mullen, Historical Med      budesonide-formoterol (SYMBICORT) 160-4.5 mcg/actuation HFA inhaler Take 2 Puffs by inhalation two (2) times a day., Historical Med      albuterol-ipratropium (DUONEB) 2.5 mg-0.5 mg/3 ml nebu 3 mL by Nebulization route every six (6) hours as needed., Historical Med      inhalational spacing device ALWAYS USE WITH INHALER, Print, Disp-1 Device, R-0           3. Follow-up Information     Follow up With Specialties Details Why [address], [address]   [address] [place] [id number] [phone]      [facility] EMERGENCY DEPT Emergency Medicine  As needed, If symptoms worsen [address] [place] [id number]  [phone]        _______________________________      Please note that this dictation was completed with [employer], the [employer] voice recognition software. Quite often unanticipated grammatical, syntax, homophones, and other interpretive errors are inadvertently transcribed by the computer software. Please disregard these errors. Please excuse any errors that have escaped final proofreading.

## 2021-12-23 ENCOUNTER — HOSPITAL ENCOUNTER (INPATIENT)
Age: 71
LOS: 5 days | Discharge: HOME | DRG: 192 | End: 2021-12-28
Attending: EMERGENCY MEDICINE | Admitting: HOSPITALIST
Payer: MEDICARE

## 2021-12-23 ENCOUNTER — APPOINTMENT (OUTPATIENT)
Dept: GENERAL RADIOLOGY | Age: 71
DRG: 192 | End: 2021-12-23
Attending: EMERGENCY MEDICINE
Payer: MEDICARE

## 2021-12-23 ENCOUNTER — APPOINTMENT (OUTPATIENT)
Dept: CT IMAGING | Age: 71
DRG: 192 | End: 2021-12-23
Attending: HOSPITALIST
Payer: MEDICARE

## 2021-12-23 DIAGNOSIS — R06.02 SOB (SHORTNESS OF BREATH): Primary | ICD-10-CM

## 2021-12-23 DIAGNOSIS — J44.1 ACUTE EXACERBATION OF CHRONIC OBSTRUCTIVE PULMONARY DISEASE (COPD) (HCC): ICD-10-CM

## 2021-12-23 PROBLEM — I77.810 AORTIC ECTASIA, THORACIC (HCC): Status: ACTIVE | Noted: 2021-12-23

## 2021-12-23 LAB
ALBUMIN SERPL-MCNC: 2.5 G/DL (ref 3.4–5)
ALBUMIN/GLOB SERPL: 0.7 {RATIO} (ref 0.8–1.7)
ALP SERPL-CCNC: 68 U/L (ref 45–117)
ALT SERPL-CCNC: 25 U/L (ref 16–61)
AMPHET UR QL SCN: NEGATIVE
ANION GAP SERPL CALC-SCNC: 7 MMOL/L (ref 3–18)
APPEARANCE UR: CLEAR
ARTERIAL PATENCY WRIST A: ABNORMAL
AST SERPL-CCNC: 11 U/L (ref 10–38)
ATRIAL RATE: 78 BPM
ATRIAL RATE: 87 BPM
B PERT DNA SPEC QL NAA+PROBE: NOT DETECTED
BARBITURATES UR QL SCN: NEGATIVE
BASE EXCESS BLD CALC-SCNC: 0.8 MMOL/L
BASOPHILS # BLD: 0 K/UL (ref 0–0.1)
BASOPHILS NFR BLD: 1 % (ref 0–2)
BDY SITE: ABNORMAL
BENZODIAZ UR QL: NEGATIVE
BILIRUB SERPL-MCNC: 0.3 MG/DL (ref 0.2–1)
BILIRUB UR QL: NEGATIVE
BNP SERPL-MCNC: 49 PG/ML (ref 0–900)
BODY TEMPERATURE: 97.7
BORDETELLA PARAPERTUSSIS PCR, BORPAR: NOT DETECTED
BUN SERPL-MCNC: 10 MG/DL (ref 7–18)
BUN/CREAT SERPL: 10 (ref 12–20)
C PNEUM DNA SPEC QL NAA+PROBE: NOT DETECTED
CALCIUM SERPL-MCNC: 7.9 MG/DL (ref 8.5–10.1)
CALCULATED P AXIS, ECG09: 69 DEGREES
CALCULATED P AXIS, ECG09: 81 DEGREES
CALCULATED R AXIS, ECG10: -29 DEGREES
CALCULATED R AXIS, ECG10: 58 DEGREES
CALCULATED T AXIS, ECG11: 74 DEGREES
CALCULATED T AXIS, ECG11: 80 DEGREES
CANNABINOIDS UR QL SCN: NEGATIVE
CHLORIDE SERPL-SCNC: 112 MMOL/L (ref 100–111)
CO2 SERPL-SCNC: 27 MMOL/L (ref 21–32)
COCAINE UR QL SCN: POSITIVE
COLOR UR: YELLOW
COVID-19 RAPID TEST, COVR: NOT DETECTED
CREAT SERPL-MCNC: 1 MG/DL (ref 0.6–1.3)
DIAGNOSIS, 93000: NORMAL
DIAGNOSIS, 93000: NORMAL
DIFFERENTIAL METHOD BLD: ABNORMAL
EOSINOPHIL # BLD: 0.1 K/UL (ref 0–0.4)
EOSINOPHIL NFR BLD: 2 % (ref 0–5)
ERYTHROCYTE [DISTWIDTH] IN BLOOD BY AUTOMATED COUNT: 16.6 % (ref 11.6–14.5)
ETHANOL SERPL-MCNC: 9 MG/DL (ref 0–3)
FLUAV H1 2009 PAND RNA SPEC QL NAA+PROBE: NOT DETECTED
FLUAV H1 RNA SPEC QL NAA+PROBE: NOT DETECTED
FLUAV H3 RNA SPEC QL NAA+PROBE: NOT DETECTED
FLUAV SUBTYP SPEC NAA+PROBE: NOT DETECTED
FLUBV RNA SPEC QL NAA+PROBE: NOT DETECTED
GAS FLOW.O2 O2 DELIVERY SYS: ABNORMAL L/MIN
GLOBULIN SER CALC-MCNC: 3.7 G/DL (ref 2–4)
GLUCOSE BLD STRIP.AUTO-MCNC: 259 MG/DL (ref 70–110)
GLUCOSE BLD STRIP.AUTO-MCNC: 364 MG/DL (ref 70–110)
GLUCOSE BLD STRIP.AUTO-MCNC: 385 MG/DL (ref 70–110)
GLUCOSE SERPL-MCNC: 113 MG/DL (ref 74–99)
GLUCOSE UR STRIP.AUTO-MCNC: 100 MG/DL
HADV DNA SPEC QL NAA+PROBE: NOT DETECTED
HCO3 BLD-SCNC: 27.4 MMOL/L (ref 22–26)
HCOV 229E RNA SPEC QL NAA+PROBE: NOT DETECTED
HCOV HKU1 RNA SPEC QL NAA+PROBE: NOT DETECTED
HCOV NL63 RNA SPEC QL NAA+PROBE: NOT DETECTED
HCOV OC43 RNA SPEC QL NAA+PROBE: NOT DETECTED
HCT VFR BLD AUTO: 40.1 % (ref 36–48)
HDSCOM,HDSCOM: ABNORMAL
HGB BLD-MCNC: 12.2 G/DL (ref 13–16)
HGB UR QL STRIP: NEGATIVE
HMPV RNA SPEC QL NAA+PROBE: NOT DETECTED
HPIV1 RNA SPEC QL NAA+PROBE: NOT DETECTED
HPIV2 RNA SPEC QL NAA+PROBE: NOT DETECTED
HPIV3 RNA SPEC QL NAA+PROBE: NOT DETECTED
HPIV4 RNA SPEC QL NAA+PROBE: NOT DETECTED
IMM GRANULOCYTES # BLD AUTO: 0 K/UL (ref 0–0.04)
IMM GRANULOCYTES NFR BLD AUTO: 0 % (ref 0–0.5)
KETONES UR QL STRIP.AUTO: NEGATIVE MG/DL
LEUKOCYTE ESTERASE UR QL STRIP.AUTO: NEGATIVE
LIPASE SERPL-CCNC: 90 U/L (ref 73–393)
LYMPHOCYTES # BLD: 2.1 K/UL (ref 0.9–3.6)
LYMPHOCYTES NFR BLD: 27 % (ref 21–52)
M PNEUMO DNA SPEC QL NAA+PROBE: NOT DETECTED
MCH RBC QN AUTO: 29 PG (ref 24–34)
MCHC RBC AUTO-ENTMCNC: 30.4 G/DL (ref 31–37)
MCV RBC AUTO: 95.5 FL (ref 78–100)
METHADONE UR QL: NEGATIVE
MONOCYTES # BLD: 0.5 K/UL (ref 0.05–1.2)
MONOCYTES NFR BLD: 7 % (ref 3–10)
NEUTS SEG # BLD: 5.2 K/UL (ref 1.8–8)
NEUTS SEG NFR BLD: 64 % (ref 40–73)
NITRITE UR QL STRIP.AUTO: NEGATIVE
NRBC # BLD: 0 K/UL (ref 0–0.01)
NRBC BLD-RTO: 0 PER 100 WBC
O2/TOTAL GAS SETTING VFR VENT: 21 %
OPIATES UR QL: NEGATIVE
P-R INTERVAL, ECG05: 142 MS
P-R INTERVAL, ECG05: 150 MS
PCO2 BLD: 49.5 MMHG (ref 35–45)
PCP UR QL: NEGATIVE
PH BLD: 7.35 [PH] (ref 7.35–7.45)
PH UR STRIP: 5 [PH] (ref 5–8)
PLATELET # BLD AUTO: 173 K/UL (ref 135–420)
PMV BLD AUTO: 9.2 FL (ref 9.2–11.8)
PO2 BLD: 64 MMHG (ref 80–100)
POTASSIUM SERPL-SCNC: 3.7 MMOL/L (ref 3.5–5.5)
PROT SERPL-MCNC: 6.2 G/DL (ref 6.4–8.2)
PROT UR STRIP-MCNC: NEGATIVE MG/DL
Q-T INTERVAL, ECG07: 366 MS
Q-T INTERVAL, ECG07: 386 MS
QRS DURATION, ECG06: 84 MS
QRS DURATION, ECG06: 86 MS
QTC CALCULATION (BEZET), ECG08: 440 MS
QTC CALCULATION (BEZET), ECG08: 440 MS
RBC # BLD AUTO: 4.2 M/UL (ref 4.35–5.65)
RSV RNA SPEC QL NAA+PROBE: NOT DETECTED
RV+EV RNA SPEC QL NAA+PROBE: DETECTED
SAO2 % BLD: 91.2 % (ref 92–97)
SARS-COV-2 PCR, COVPCR: NOT DETECTED
SERVICE CMNT-IMP: ABNORMAL
SODIUM SERPL-SCNC: 146 MMOL/L (ref 136–145)
SOURCE, COVRS: NORMAL
SP GR UR REFRACTOMETRY: 1.03 (ref 1–1.03)
SPECIMEN TYPE: ABNORMAL
TOTAL RESP. RATE, ITRR: 24
TROPONIN-HIGH SENSITIVITY: 7 NG/L (ref 0–78)
TROPONIN-HIGH SENSITIVITY: 8 NG/L (ref 0–78)
UROBILINOGEN UR QL STRIP.AUTO: 1 EU/DL (ref 0.2–1)
VENTRICULAR RATE, ECG03: 78 BPM
VENTRICULAR RATE, ECG03: 87 BPM
WBC # BLD AUTO: 8.1 K/UL (ref 4.6–13.2)

## 2021-12-23 PROCEDURE — 71045 X-RAY EXAM CHEST 1 VIEW: CPT

## 2021-12-23 PROCEDURE — 74011250636 HC RX REV CODE- 250/636: Performed by: EMERGENCY MEDICINE

## 2021-12-23 PROCEDURE — 96375 TX/PRO/DX INJ NEW DRUG ADDON: CPT

## 2021-12-23 PROCEDURE — 74011000250 HC RX REV CODE- 250: Performed by: EMERGENCY MEDICINE

## 2021-12-23 PROCEDURE — 94640 AIRWAY INHALATION TREATMENT: CPT

## 2021-12-23 PROCEDURE — 83880 ASSAY OF NATRIURETIC PEPTIDE: CPT

## 2021-12-23 PROCEDURE — 96366 THER/PROPH/DIAG IV INF ADDON: CPT

## 2021-12-23 PROCEDURE — 87635 SARS-COV-2 COVID-19 AMP PRB: CPT

## 2021-12-23 PROCEDURE — 93005 ELECTROCARDIOGRAM TRACING: CPT

## 2021-12-23 PROCEDURE — 74011250636 HC RX REV CODE- 250/636: Performed by: HOSPITALIST

## 2021-12-23 PROCEDURE — 74011636637 HC RX REV CODE- 636/637: Performed by: HOSPITALIST

## 2021-12-23 PROCEDURE — 96365 THER/PROPH/DIAG IV INF INIT: CPT

## 2021-12-23 PROCEDURE — 80307 DRUG TEST PRSMV CHEM ANLYZR: CPT

## 2021-12-23 PROCEDURE — 80053 COMPREHEN METABOLIC PANEL: CPT

## 2021-12-23 PROCEDURE — 82803 BLOOD GASES ANY COMBINATION: CPT

## 2021-12-23 PROCEDURE — 82962 GLUCOSE BLOOD TEST: CPT

## 2021-12-23 PROCEDURE — 74011000636 HC RX REV CODE- 636: Performed by: HOSPITALIST

## 2021-12-23 PROCEDURE — 85025 COMPLETE CBC W/AUTO DIFF WBC: CPT

## 2021-12-23 PROCEDURE — 84484 ASSAY OF TROPONIN QUANT: CPT

## 2021-12-23 PROCEDURE — 99285 EMERGENCY DEPT VISIT HI MDM: CPT

## 2021-12-23 PROCEDURE — 81003 URINALYSIS AUTO W/O SCOPE: CPT

## 2021-12-23 PROCEDURE — 0202U NFCT DS 22 TRGT SARS-COV-2: CPT

## 2021-12-23 PROCEDURE — 74011000258 HC RX REV CODE- 258: Performed by: HOSPITALIST

## 2021-12-23 PROCEDURE — 82077 ASSAY SPEC XCP UR&BREATH IA: CPT

## 2021-12-23 PROCEDURE — 83690 ASSAY OF LIPASE: CPT

## 2021-12-23 PROCEDURE — 65660000000 HC RM CCU STEPDOWN

## 2021-12-23 PROCEDURE — 74011250637 HC RX REV CODE- 250/637: Performed by: HOSPITALIST

## 2021-12-23 PROCEDURE — 71275 CT ANGIOGRAPHY CHEST: CPT

## 2021-12-23 PROCEDURE — 36600 WITHDRAWAL OF ARTERIAL BLOOD: CPT

## 2021-12-23 PROCEDURE — 74011000250 HC RX REV CODE- 250: Performed by: HOSPITALIST

## 2021-12-23 RX ORDER — DULOXETIN HYDROCHLORIDE 20 MG/1
20 CAPSULE, DELAYED RELEASE ORAL DAILY
Status: DISCONTINUED | OUTPATIENT
Start: 2021-12-23 | End: 2021-12-28 | Stop reason: HOSPADM

## 2021-12-23 RX ORDER — DEXTROSE 50 % IN WATER (D50W) INTRAVENOUS SYRINGE
25-50 AS NEEDED
Status: DISCONTINUED | OUTPATIENT
Start: 2021-12-23 | End: 2021-12-28 | Stop reason: HOSPADM

## 2021-12-23 RX ORDER — ACETAMINOPHEN 650 MG/1
650 SUPPOSITORY RECTAL
Status: DISCONTINUED | OUTPATIENT
Start: 2021-12-23 | End: 2021-12-28 | Stop reason: HOSPADM

## 2021-12-23 RX ORDER — IPRATROPIUM BROMIDE AND ALBUTEROL SULFATE 2.5; .5 MG/3ML; MG/3ML
3 SOLUTION RESPIRATORY (INHALATION)
Status: COMPLETED | OUTPATIENT
Start: 2021-12-23 | End: 2021-12-23

## 2021-12-23 RX ORDER — ARFORMOTEROL TARTRATE 15 UG/2ML
15 SOLUTION RESPIRATORY (INHALATION)
Status: DISCONTINUED | OUTPATIENT
Start: 2021-12-23 | End: 2021-12-28 | Stop reason: HOSPADM

## 2021-12-23 RX ORDER — DOXYCYCLINE 100 MG/1
100 CAPSULE ORAL EVERY 12 HOURS
Status: DISCONTINUED | OUTPATIENT
Start: 2021-12-23 | End: 2021-12-28 | Stop reason: HOSPADM

## 2021-12-23 RX ORDER — LORAZEPAM 2 MG/ML
1 INJECTION INTRAMUSCULAR
Status: DISCONTINUED | OUTPATIENT
Start: 2021-12-23 | End: 2021-12-28 | Stop reason: HOSPADM

## 2021-12-23 RX ORDER — INSULIN GLARGINE 100 [IU]/ML
10 INJECTION, SOLUTION SUBCUTANEOUS DAILY
Status: DISCONTINUED | OUTPATIENT
Start: 2021-12-23 | End: 2021-12-28 | Stop reason: HOSPADM

## 2021-12-23 RX ORDER — SODIUM CHLORIDE 0.9 % (FLUSH) 0.9 %
5-40 SYRINGE (ML) INJECTION AS NEEDED
Status: DISCONTINUED | OUTPATIENT
Start: 2021-12-23 | End: 2021-12-28 | Stop reason: HOSPADM

## 2021-12-23 RX ORDER — PROMETHAZINE HYDROCHLORIDE 25 MG/1
12.5 TABLET ORAL
Status: DISCONTINUED | OUTPATIENT
Start: 2021-12-23 | End: 2021-12-28 | Stop reason: HOSPADM

## 2021-12-23 RX ORDER — SODIUM CHLORIDE 0.9 % (FLUSH) 0.9 %
5-40 SYRINGE (ML) INJECTION EVERY 8 HOURS
Status: DISCONTINUED | OUTPATIENT
Start: 2021-12-23 | End: 2021-12-28 | Stop reason: HOSPADM

## 2021-12-23 RX ORDER — ACETAMINOPHEN 325 MG/1
650 TABLET ORAL
Status: DISCONTINUED | OUTPATIENT
Start: 2021-12-23 | End: 2021-12-28 | Stop reason: HOSPADM

## 2021-12-23 RX ORDER — ENOXAPARIN SODIUM 100 MG/ML
40 INJECTION SUBCUTANEOUS DAILY
Status: DISCONTINUED | OUTPATIENT
Start: 2021-12-23 | End: 2021-12-23

## 2021-12-23 RX ORDER — CEFTRIAXONE 250 MG/8ML
1000 INJECTION, POWDER, FOR SOLUTION INTRAMUSCULAR; INTRAVENOUS ONCE
Status: COMPLETED | OUTPATIENT
Start: 2021-12-23 | End: 2021-12-23

## 2021-12-23 RX ORDER — ARIPIPRAZOLE 5 MG/1
5 TABLET ORAL DAILY
Status: DISCONTINUED | OUTPATIENT
Start: 2021-12-23 | End: 2021-12-28 | Stop reason: HOSPADM

## 2021-12-23 RX ORDER — INSULIN LISPRO 100 [IU]/ML
3 INJECTION, SOLUTION INTRAVENOUS; SUBCUTANEOUS
Status: DISCONTINUED | OUTPATIENT
Start: 2021-12-24 | End: 2021-12-28 | Stop reason: HOSPADM

## 2021-12-23 RX ORDER — INSULIN LISPRO 100 [IU]/ML
INJECTION, SOLUTION INTRAVENOUS; SUBCUTANEOUS
Status: DISCONTINUED | OUTPATIENT
Start: 2021-12-23 | End: 2021-12-28 | Stop reason: HOSPADM

## 2021-12-23 RX ORDER — BUDESONIDE 0.25 MG/2ML
250 INHALANT ORAL
Status: DISCONTINUED | OUTPATIENT
Start: 2021-12-23 | End: 2021-12-28 | Stop reason: HOSPADM

## 2021-12-23 RX ORDER — ONDANSETRON 2 MG/ML
4 INJECTION INTRAMUSCULAR; INTRAVENOUS
Status: DISCONTINUED | OUTPATIENT
Start: 2021-12-23 | End: 2021-12-28 | Stop reason: HOSPADM

## 2021-12-23 RX ORDER — FACIAL-BODY WIPES
10 EACH TOPICAL DAILY PRN
Status: DISCONTINUED | OUTPATIENT
Start: 2021-12-23 | End: 2021-12-28 | Stop reason: HOSPADM

## 2021-12-23 RX ORDER — DABIGATRAN ETEXILATE 75 MG/1
150 CAPSULE ORAL EVERY 12 HOURS
Status: DISCONTINUED | OUTPATIENT
Start: 2021-12-23 | End: 2021-12-28 | Stop reason: HOSPADM

## 2021-12-23 RX ORDER — PANTOPRAZOLE SODIUM 40 MG/1
40 TABLET, DELAYED RELEASE ORAL
Status: DISCONTINUED | OUTPATIENT
Start: 2021-12-23 | End: 2021-12-28 | Stop reason: HOSPADM

## 2021-12-23 RX ORDER — CLONIDINE HYDROCHLORIDE 0.1 MG/1
0.1 TABLET ORAL 2 TIMES DAILY
Status: DISCONTINUED | OUTPATIENT
Start: 2021-12-23 | End: 2021-12-28 | Stop reason: HOSPADM

## 2021-12-23 RX ORDER — MAGNESIUM SULFATE 100 %
4 CRYSTALS MISCELLANEOUS AS NEEDED
Status: DISCONTINUED | OUTPATIENT
Start: 2021-12-23 | End: 2021-12-28 | Stop reason: HOSPADM

## 2021-12-23 RX ORDER — IPRATROPIUM BROMIDE AND ALBUTEROL SULFATE 2.5; .5 MG/3ML; MG/3ML
3 SOLUTION RESPIRATORY (INHALATION)
Status: DISCONTINUED | OUTPATIENT
Start: 2021-12-23 | End: 2021-12-28 | Stop reason: HOSPADM

## 2021-12-23 RX ADMIN — ARFORMOTEROL TARTRATE 15 MCG: 15 SOLUTION RESPIRATORY (INHALATION) at 10:03

## 2021-12-23 RX ADMIN — DULOXETINE HYDROCHLORIDE 20 MG: 20 CAPSULE, DELAYED RELEASE ORAL at 12:03

## 2021-12-23 RX ADMIN — METHYLPREDNISOLONE SODIUM SUCCINATE 60 MG: 125 INJECTION, POWDER, FOR SOLUTION INTRAMUSCULAR; INTRAVENOUS at 18:12

## 2021-12-23 RX ADMIN — SODIUM CHLORIDE 1000 ML: 9 INJECTION, SOLUTION INTRAVENOUS at 07:32

## 2021-12-23 RX ADMIN — Medication 6 UNITS: at 21:35

## 2021-12-23 RX ADMIN — Medication 10 ML: at 14:00

## 2021-12-23 RX ADMIN — PIPERACILLIN AND TAZOBACTAM 3.38 G: 3; .375 INJECTION, POWDER, LYOPHILIZED, FOR SOLUTION INTRAVENOUS at 12:13

## 2021-12-23 RX ADMIN — Medication 10 ML: at 08:48

## 2021-12-23 RX ADMIN — Medication 10 ML: at 21:36

## 2021-12-23 RX ADMIN — PANTOPRAZOLE SODIUM 40 MG: 40 TABLET, DELAYED RELEASE ORAL at 12:03

## 2021-12-23 RX ADMIN — PIPERACILLIN AND TAZOBACTAM 3.38 G: 3; .375 INJECTION, POWDER, LYOPHILIZED, FOR SOLUTION INTRAVENOUS at 14:59

## 2021-12-23 RX ADMIN — IPRATROPIUM BROMIDE AND ALBUTEROL SULFATE 3 ML: .5; 3 SOLUTION RESPIRATORY (INHALATION) at 07:33

## 2021-12-23 RX ADMIN — ARIPIPRAZOLE 5 MG: 5 TABLET ORAL at 12:03

## 2021-12-23 RX ADMIN — Medication 10 UNITS: at 18:26

## 2021-12-23 RX ADMIN — MULTIPLE VITAMINS W/ MINERALS TAB 1 TABLET: TAB at 12:03

## 2021-12-23 RX ADMIN — DABIGATRAN ETEXILATE MESYLATE 150 MG: 75 CAPSULE ORAL at 14:59

## 2021-12-23 RX ADMIN — CEFTRIAXONE SODIUM 1000 MG: 250 INJECTION, POWDER, FOR SOLUTION INTRAMUSCULAR; INTRAVENOUS at 05:39

## 2021-12-23 RX ADMIN — Medication 10 UNITS: at 19:00

## 2021-12-23 RX ADMIN — BUDESONIDE 250 MCG: 0.25 INHALANT RESPIRATORY (INHALATION) at 10:03

## 2021-12-23 RX ADMIN — CLONIDINE HYDROCHLORIDE 0.1 MG: 0.1 TABLET ORAL at 21:35

## 2021-12-23 RX ADMIN — IPRATROPIUM BROMIDE AND ALBUTEROL SULFATE 3 ML: .5; 3 SOLUTION RESPIRATORY (INHALATION) at 15:34

## 2021-12-23 RX ADMIN — AZITHROMYCIN MONOHYDRATE 500 MG: 500 INJECTION, POWDER, LYOPHILIZED, FOR SOLUTION INTRAVENOUS at 05:40

## 2021-12-23 RX ADMIN — IOPAMIDOL 100 ML: 755 INJECTION, SOLUTION INTRAVENOUS at 10:32

## 2021-12-23 RX ADMIN — BUDESONIDE 250 MCG: 0.25 INHALANT RESPIRATORY (INHALATION) at 20:52

## 2021-12-23 RX ADMIN — IPRATROPIUM BROMIDE AND ALBUTEROL SULFATE 3 ML: .5; 3 SOLUTION RESPIRATORY (INHALATION) at 20:52

## 2021-12-23 RX ADMIN — ARFORMOTEROL TARTRATE 15 MCG: 15 SOLUTION RESPIRATORY (INHALATION) at 20:52

## 2021-12-23 RX ADMIN — METHYLPREDNISOLONE SODIUM SUCCINATE 60 MG: 125 INJECTION, POWDER, FOR SOLUTION INTRAMUSCULAR; INTRAVENOUS at 23:56

## 2021-12-23 RX ADMIN — METHYLPREDNISOLONE SODIUM SUCCINATE 60 MG: 125 INJECTION, POWDER, FOR SOLUTION INTRAMUSCULAR; INTRAVENOUS at 12:03

## 2021-12-23 RX ADMIN — PIPERACILLIN AND TAZOBACTAM 3.38 G: 3; .375 INJECTION, POWDER, LYOPHILIZED, FOR SOLUTION INTRAVENOUS at 21:34

## 2021-12-23 RX ADMIN — DOXYCYCLINE 100 MG: 100 CAPSULE ORAL at 21:35

## 2021-12-23 RX ADMIN — IPRATROPIUM BROMIDE AND ALBUTEROL SULFATE 3 ML: .5; 3 SOLUTION RESPIRATORY (INHALATION) at 23:49

## 2021-12-23 NOTE — PROGRESS NOTES
Pt ate dinner before getting blood sugar checked. Aide notified this RN. Will wait an hour and recheck. 1820 Blood sugar was rechecked. Blood glucose was 385. Notified Dr. Rosa Villatoro. Administered 10 units of insulin.

## 2021-12-23 NOTE — PROGRESS NOTES
Care Management    Reason for Admission: COPD exacerbation    Chart reviewed. Per H&P: \"70year-old male presents to the emergency department via EMS for shortness of breath. Patient has a known history of alcohol abuse anxiety, asthma, pain abuse, COPD, and medication noncompliance. He was seen in the ED 2 days ago for a COPD exacerbation and treated in the ED and released home. Arrived to the ED today EMS gave Solu-Medrol, magnesium, and duo nebs in the field. Arrival to the emergency department 98% on room air pulse of 85 respirations were 26 afebrile.       Prior to admission patient was living:     Prior to admission patient was using the following DME:                     RUR Score: 21%                   Plan for utilizing home health:          COVID Vaccine Status:  TBD    PCP: First and Last name: Eder Flores MD    Name of Practice:    Are you a current patient: Yes/No:    Approximate date of last visit:    Can you participate in a virtual visit with your PCP:                     Current Advanced Directive/Advance Care Plan: Full Code    Healthcare Decision Maker:   Primary Decision Maker: Jacey Fara Olvera [facility] [phone]  Click here to complete [address] including selection of the Healthcare Decision Maker Relationship (ie \"Primary\")                         Transition of Care Plan: In progress     1240-CM attempted to see patient, he was too sleepy and CM could not complete assessment at this time. CM to try again later. 1600-CM attempted to call patient room, no answer at this time. CM to see patient tomorrow.

## 2021-12-23 NOTE — ED NOTES
Patient report received from [place], [address] [place] [address] patient hostile when bedside report along with Dr Ernesto Stephen he was hostile. States he did not get his medications.  Dr. Ernesto Stephen will ask [facility] to assist

## 2021-12-23 NOTE — ED PROVIDER NOTES
27-year-old male presents to the emergency department via EMS for shortness of breath. Patient has a known history of alcohol abuse anxiety, asthma, pain abuse, COPD, and medication noncompliance. He was seen in the ED 2 days ago for a COPD exacerbation and treated in the ED and released home. Arrived to the ED today EMS gave Solu-Medrol, magnesium, and duo nebs in the field. Arrival to the emergency department 98% on room air pulse of 85 respirations were 26 afebrile. Past Medical History:   Diagnosis Date    Alcohol abuse     Anxiety     Asthma     Bronchitis     Chronic deep vein thrombosis (DVT) (HCC) 07/2013 07/2013, 12/2014 (LLE)    Cocaine abuse ([facility] [address]     COPD     Depression     GERD (gastroesophageal reflux disease)     Hyperlipidemia     Hypertension     Major depression     Mild diastolic dysfunction     NEIL on CPAP     Renal insufficiency     Suicidal ideation     Vitamin D deficiency        Past Surgical History:   Procedure Laterality Date    HX HIP REPLACEMENT  08/2010    HX OTHER SURGICAL  06/2018    ENDOVASCULAR ANEURYSM REPAIR         No family history on file. Social History     Socioeconomic History    Marital status: SINGLE     Spouse name: Not on file    Number of children: Not on file    Years of education: Not on file    Highest education level: Not on file   Occupational History    Not on file   Tobacco Use    Smoking status: Former Smoker    Smokeless tobacco: Never Used    Tobacco comment: States that he quit 12 years ago.    Substance and Sexual Activity    Alcohol use: Not Currently    Drug use: Yes     Types: Cocaine     Comment: 3-4 days ago    Sexual activity: Not on file   Other Topics Concern    Not on file   Social History Narrative    Not on file     Social Determinants of Health     Financial Resource Strain:     Difficulty of Paying Living Expenses: Not on file   Food Insecurity:     Worried About [address] in the Last Year: Not on file    Ran Out of Food in the Last Year: Not on file   Transportation Needs:     Lack of Transportation (Medical): Not on file    Lack of Transportation (Non-Medical): Not on file   Physical Activity:     Days of Exercise per Week: Not on file    Minutes of Exercise per Session: Not on file   Stress:     Feeling of Stress : Not on file   Social Connections:     Frequency of Communication with Friends and Family: Not on file    Frequency of Social Gatherings with Friends and Family: Not on file    Attends [employer] Services: Not on file    Active Member of [address] [place] [address] or Organizations: Not on file    Attends Club or Organization Meetings: Not on file    Marital Status: Not on file   Intimate Partner Violence:     Fear of Current or Ex-Partner: Not on file    Emotionally Abused: Not on file    Physically Abused: Not on file    Sexually Abused: Not on file   Housing Stability:     Unable to Pay for Housing in the Last Year: Not on file    Number of [place] in the Last Year: Not on file    Unstable Housing in the Last Year: Not on file         ALLERGIES: Shellfish derived    Review of Systems   Constitutional: Positive for activity change. Negative for appetite change, chills, diaphoresis, fatigue, fever and unexpected weight change. HENT: Positive for congestion and postnasal drip. Negative for dental problem, drooling, ear discharge, sinus pressure, sinus pain and tinnitus. Eyes: Negative. Respiratory: Positive for cough and shortness of breath. Negative for apnea, chest tightness and stridor. Cardiovascular: Negative for chest pain and leg swelling. Gastrointestinal: Negative. Genitourinary: Negative. Musculoskeletal: Positive for arthralgias, back pain and myalgias. Negative for gait problem and neck pain. Skin: Negative. Neurological: Negative. Hematological: Negative. Psychiatric/Behavioral: Negative.         Vitals:    12/23/21 0429   BP: 133/89 Pulse: 85   Resp: 26   Temp: 97.7 °F (36.5 °C)   SpO2: 98%   Weight: 95.3 kg (210 lb)   Height: 6' 1\" (1.854 m)            Physical Exam  Vitals and nursing note reviewed. Constitutional:       General: He is not in acute distress. Appearance: He is ill-appearing. He is not toxic-appearing or diaphoretic. HENT:      Head: Normocephalic and atraumatic. Neck:      Thyroid: No thyromegaly. Vascular: No hepatojugular reflux or JVD. Cardiovascular:      Rate and Rhythm: Regular rhythm. Tachycardia present. No extrasystoles are present. Pulses: Normal pulses. No decreased pulses. Heart sounds: No murmur heard. No friction rub. Pulmonary:      Effort: Tachypnea present. No bradypnea, accessory muscle usage or respiratory distress. Breath sounds: No stridor. Examination of the right-lower field reveals decreased breath sounds. Examination of the left-lower field reveals decreased breath sounds. Decreased breath sounds, wheezing and rhonchi present. No rales. Chest:      Chest wall: No mass, deformity, tenderness or crepitus. Musculoskeletal:         General: Normal range of motion. Cervical back: Normal range of motion and neck supple. Right lower leg: No edema. Left lower leg: No edema. Lymphadenopathy:      Cervical: No cervical adenopathy. Skin:     General: Skin is warm and dry. Capillary Refill: Capillary refill takes less than 2 seconds. Coloration: Skin is not cyanotic or pale. Findings: No ecchymosis. Neurological:      General: No focal deficit present. Mental Status: He is alert.           MDM  Number of Diagnoses or Management Options  Acute exacerbation of chronic obstructive pulmonary disease (COPD) (HCC)  SOB (shortness of breath)  Diagnosis management comments: 77-year-old male with multiple medical conditions including asthma, COPD, multidrug abuse, retention and renal insufficiency presents to the emergency room via EMS for shortness of breath. Patient was seen here on December 21 for COPD exacerbation at that time found to be stable for discharge home and outpatient treatment. He was supposed to be on Doxy and prednisone however patient never picked up his medications. In route EMS gave a DuoNeb, 2 g of magnesium, and 125 Solu-Medrol. Patient arrived to the emergency department tachycardic but speaking in full sentences with an SPO2 of 98%. Labs were repeated chest x-ray was done COVID-19 testing was sent. Blood gas was drawn and shows hypoxia however near patient baseline. CXR shows worsening pulmonary congestion with blunting of right costophrenic angle. Will observe patient for admission v discharge for COPD exhacerbation.   At time of turnover pt still in discharge periods waiting for re-eval.  Signed over to Dr Jhonathan Salcedo for re-eval and possibly more meds vs admit             Critical Care  Performed by: Luna Herrera MD  Authorized by: Luna Herrera MD     Critical care provider statement:     Critical care time (minutes):  30    Critical care time was exclusive of:  Separately billable procedures and treating other patients    Critical care was necessary to treat or prevent imminent or life-threatening deterioration of the following conditions:  Respiratory failure    Critical care was time spent personally by me on the following activities:  Development of treatment plan with patient or surrogate, evaluation of patient's response to treatment, examination of patient, ordering and performing treatments and interventions, ordering and review of laboratory studies, ordering and review of radiographic studies, pulse oximetry, re-evaluation of patient's condition and review of old charts

## 2021-12-23 NOTE — ED NOTES
Pt arrived per emms, he has history of copd and has increased sob for last couple of days worsening over time. EMS gave pt solumedrol 125 mg iv, mag sulfate 2 gms IV, a duoneb and an albuterol treatment. Pt alert with continued sob but feels better then before EMS treatment.  Pt has a phlegmy cough

## 2021-12-23 NOTE — H&P
History & Physical    Patient: Reanna Barnes MRN: [id number]  CSN: [id number]    YOB: 1950  Age: 70 y.o. Sex: male      DOA: 12/23/2021  Primary Care Provider:  Didier Dawn MD      Assessment/Plan     Hospital Problems  Date Reviewed: 12/14/2015          Codes Class Noted POA    Aortic ectasia, thoracic ([facility] [address] ICD-10-CM: X82.608  ICD-9-CM: 447.71  12/23/2021 Unknown        COPD with acute exacerbation ([facility] [address] ICD-10-CM: J44.1  ICD-9-CM: 491.21  12/7/2021 Unknown        Hypertension ICD-10-CM: I10  ICD-9-CM: 401.9  Unknown Yes        Anxiety ICD-10-CM: F41.9  ICD-9-CM: 300.00  Unknown Yes        COPD exacerbation ([facility] [address] ICD-10-CM: J44.1  ICD-9-CM: 491.21  11/22/2016 Unknown        Cocaine abuse (HCC) (Chronic) ICD-10-CM: F14.10  ICD-9-CM: 305.60  12/13/2015 Yes                Admit to tele   Hwang Simon Magaña is a 70 y. o. male   with a past medical history of COPD/asthma /emphysema, cocaine abuse, hypertension, depression, NEIL on CPAP and noncompliance to medical treatment is  admitted for COPD exacerbation.      COPD exacerbation/emphysema   Not compliance of treatment   Continue iv steroid and breathing tx   cta no PE   Doxy      Cocaine use/abuse   Advised permanent cessation  Ativan as needed, clonidine  Watch for withdrawal     Hypertension:   continue clonidine      NEIL on CPAP:   Nighttime CPAP     Noncompliance to medical treatment   education     Anxiety continue home meds     Aortic actasia , throatic   Need f/u with ct surgery   bp control     Full code     Please note that this dictation was completed with [employer], the [email or web address] voice recognition software. Quite often unanticipated grammatical, syntax, homophones, and other interpretive errors are inadvertently transcribed by the computer software. Please disregard these errors.   Please excuse any errors that have escaped final proofreading    Estimate  length of stay : 2-3 day    CC: sob        HPI:     Reanna Barnes is a 70 y.o. male with COPD, asthma, emphysema, cocaine abuse, hypertension presented to ER due to shortness of breath for several days and  worsening today. He was discharged recently with steroid for her COPD exacerbation. He did not refill the medication-he stated he has no money to refill it. He uses inhaler for breathing treatment at home, however, his shortness of breath is not improving. He denies recently used cocaine- last use was 6 days ago. But UDS is positive for cocaine. He received a breathing treatment in ER. Chest x-ray questionable right side consolidation. CTA was performed, no PE, but emphysema changes. Visit Vitals  BP (!) 157/83 (BP 1 Location: Right arm)   Pulse 86   Temp 97.2 °F (36.2 °C)   Resp 20   Ht 6' 1\" (1.854 m)   Wt 95.3 kg (210 lb)   SpO2 100%   BMI 27.71 kg/m²      O2 Device: Nasal cannula,None      Past Medical History:   Diagnosis Date    Alcohol abuse     Anxiety     Asthma     Bronchitis     Chronic deep vein thrombosis (DVT) (HCC) 07/2013 07/2013, 12/2014 ([facility])    Cocaine abuse ([facility] [address]     COPD     Depression     GERD (gastroesophageal reflux disease)     Hyperlipidemia     Hypertension     Major depression     Mild diastolic dysfunction     NEIL on CPAP     Renal insufficiency     Suicidal ideation     Vitamin D deficiency        Past Surgical History:   Procedure Laterality Date    HX HIP REPLACEMENT  08/2010    HX OTHER SURGICAL  06/2018    ENDOVASCULAR ANEURYSM REPAIR     Family History      Medical History Relation Name Comments   Diabetes Brother         Relation Name Status Comments   Brother   Alive          Social History     Socioeconomic History    Marital status: SINGLE   Tobacco Use    Smoking status: Former Smoker    Smokeless tobacco: Never Used    Tobacco comment: States that he quit 12 years ago.    Substance and Sexual Activity    Alcohol use: Not Currently    Drug use: Yes     Types: Cocaine     Comment: 3-4 days ago       Prior to Admission medications    Medication Sig Start Date End Date Taking? Authorizing Provider   doxycycline (VIBRA-TABS) 100 mg tablet Take 1 Tablet by mouth two (2) times a day for 7 days. 12/21/21 12/28/21  Marina Brunson MD   predniSONE (DELTASONE) 50 mg tablet Take 1 Tablet by mouth daily for 5 days. 12/21/21 12/26/21  Marina Brunson MD   DULoxetine (CYMBALTA) 20 mg capsule 20 mg.    Provider, Historical   therapeutic multivitamin-minerals (THERAGRAN-M) tablet Take 1 Tab by Mouth Once a Day. 6/16/18   Provider, Historical   ARIPiprazole (ABILIFY) 5 mg tablet Take 1 Tab by mouth daily. Indications: DEPRESSION TREATMENT ADJUNCT 2/21/17   Nayla Fajardo MD   dabigatran etexilate (PRADAXA) 150 mg capsule Take 1 Cap by mouth every twelve (12) hours. Indications: PREVENT THROMBOEMBOLISM IN CHRONIC ATRIAL FIBRILLATION, PREVENTION OF DEEP VEIN THROMBOSIS RECURRENCE 2/21/17   Nayla Fajardo MD   albuterol (PROVENTIL HFA, VENTOLIN HFA, PROAIR HFA) 90 mcg/actuation inhaler Take 2 Puffs by inhalation every four (4) hours as needed for Wheezing or Shortness of Breath (Cough). 2/10/17   SHERRIE Echeverria   inhalational spacing device ALWAYS USE WITH INHALER 2/10/17   Aj FONSECA, [address]   SPIRIVA WITH HANDIHALER 18 mcg inhalation capsule Take 1 Cap by inhalation daily. 2/1/17 Dr. Eryn Boudreaux 2/1/17   Other, MD Keisha   omeprazole (PRILOSEC) 20 mg capsule Take 1 Cap by mouth two (2) times a day. 2/1/17, QTY#60, 30 Days. 2/1/17 Dr. Eryn Boudreaux 2/1/17   Other, MD Keisha   budesonide-formoterol [facility]) 160-4.5 mcg/actuation HFA inhaler Take 2 Puffs by inhalation two (2) times a day. Provider, Historical   albuterol-ipratropium (DUONEB) 2.5 mg-0.5 mg/3 ml nebu 3 mL by Nebulization route every six (6) hours as needed. Provider, Historical       Allergies   Allergen Reactions    Shellfish Derived Anaphylaxis       Review of Systems  Gen: No fever, chills, malaise, weight loss/gain.    Heent: No headache, rhinorrhea, epistaxis, ear pain, hearing loss, sinus pain, neck pain/stiffness, sore throat. Heart: No chest pain, palpitations, WREN, pnd, or orthopnea. Resp: No cough, hemoptysis, wheezing and + shortness of breath. GI: No nausea, vomiting, diarrhea, constipation, melena or hematochezia. : No urinary obstruction, dysuria or hematuria. Derm: No rash, new skin lesion or pruritis. Musc/skeletal: no bone or joint complains. Vasc: No edema, cyanosis or claudication. Endo: No heat/cold intolerance, no polyuria,polydipsia or polyphagia. Neuro: No unilateral weakness, numbness, tingling. No seizures. Heme: No easy bruising or bleeding. Physical Exam:     Physical Exam:  Visit Vitals  BP (!) 157/83 (BP 1 Location: Right arm)   Pulse 86   Temp 97.2 °F (36.2 °C)   Resp 20   Ht 6' 1\" (1.854 m)   Wt 95.3 kg (210 lb)   SpO2 100%   BMI 27.71 kg/m²      O2 Device: Nasal cannula,None    Temp (24hrs), Av.3 °F (36.3 °C), Min:97 °F (36.1 °C), Max:97.7 °F (36.5 °C)    No intake/output data recorded. No intake/output data recorded. General:  Awake, cooperative, mild distress. Head:  Normocephalic, without obvious abnormality, atraumatic. Eyes:  Conjunctivae/corneas clear, sclera anicteric, PERRL, EOMs intact. Nose: Nares normal. No drainage or sinus tenderness. Throat: Lips, mucosa, and tongue normal. .   Neck: Supple, symmetrical, trachea midline, no adenopathy. Lungs:    Bilateral wheezing       Heart:  Regular rate and rhythm, S1, S2 normal, no murmur, click, rub or gallop. Abdomen: Soft, non-tender. Bowel sounds normal. No masses,  No organomegaly. Extremities: Extremities normal, atraumatic, no cyanosis or edema. Pulses: 2+ and symmetric all extremities. Skin: Skin color-pink, texture, turgor normal. No rashes or lesions. Capillary refill normal    Neurologic: CNII-XII intact. No focal motor or sensory deficit.        Labs Reviewed:    BMP:   Lab Results   Component Value Date/Time  (H) 12/23/2021 04:37 AM    K 3.7 12/23/2021 04:37 AM     (H) 12/23/2021 04:37 AM    CO2 27 12/23/2021 04:37 AM    AGAP 7 12/23/2021 04:37 AM     (H) 12/23/2021 04:37 AM    BUN 10 12/23/2021 04:37 AM    CREA 1.00 12/23/2021 04:37 AM    GFRAA >60 12/23/2021 04:37 AM    GFRNA >60 12/23/2021 04:37 AM     CMP:   Lab Results   Component Value Date/Time     (H) 12/23/2021 04:37 AM    K 3.7 12/23/2021 04:37 AM     (H) 12/23/2021 04:37 AM    CO2 27 12/23/2021 04:37 AM    AGAP 7 12/23/2021 04:37 AM     (H) 12/23/2021 04:37 AM    BUN 10 12/23/2021 04:37 AM    CREA 1.00 12/23/2021 04:37 AM    GFRAA >60 12/23/2021 04:37 AM    GFRNA >60 12/23/2021 04:37 AM    CA 7.9 (L) 12/23/2021 04:37 AM    ALB 2.5 (L) 12/23/2021 04:37 AM    TP 6.2 (L) 12/23/2021 04:37 AM    GLOB 3.7 12/23/2021 04:37 AM    AGRAT 0.7 (L) 12/23/2021 04:37 AM    ALT 25 12/23/2021 04:37 AM     CBC:   Lab Results   Component Value Date/Time    WBC 8.1 12/23/2021 04:37 AM    HGB 12.2 (L) 12/23/2021 04:37 AM    HCT 40.1 12/23/2021 04:37 AM     12/23/2021 04:37 AM     All Cardiac Markers in the last 24 hours: No results found for: CPK, CK, CKMMB, CKMB, RCK3, CKMBT, CKNDX, CKND1, RENU, TROPT, TROIQ, KHADAR, TROPT, TNIPOC, BNP, BNPP  Recent Glucose Results:   Lab Results   Component Value Date/Time     (H) 12/23/2021 04:37 AM     ABG:   Lab Results   Component Value Date/Time    PHI 7.35 12/23/2021 05:08 AM    PCO2I 49.5 (H) 12/23/2021 05:08 AM    PO2I 64 (L) 12/23/2021 05:08 AM    HCO3I 27.4 (H) 12/23/2021 05:08 AM    FIO2I 21 12/23/2021 05:08 AM     COAGS: No results found for: APTT, PTP, INR, INREXT, INREXT  Liver Panel:   Lab Results   Component Value Date/Time    ALB 2.5 (L) 12/23/2021 04:37 AM    TP 6.2 (L) 12/23/2021 04:37 AM    GLOB 3.7 12/23/2021 04:37 AM    AGRAT 0.7 (L) 12/23/2021 04:37 AM    ALT 25 12/23/2021 04:37 AM    AP 68 12/23/2021 04:37 AM     Pancreatic Markers:   Lab Results   Component Value Date/Time    LPSE 90 12/23/2021 04:37 AM       XR CHEST PORT    Result Date: 12/23/2021  EXAM: XR CHEST PORT CLINICAL INDICATION/HISTORY: SOB -Additional: None COMPARISON: One day prior TECHNIQUE: Frontal view of the chest _______________ FINDINGS: HEART AND MEDIASTINUM: Normal cardiac size and mediastinal contours. LUNGS AND PLEURAL SPACES: Right mid lung zone interstitial opacities. No pleural effusion. BONY THORAX AND SOFT TISSUES: No acute osseous abnormality _______________     Right mid lung zone interstitial opacities, essentially unchanged. XR CHEST PORT    Result Date: 12/21/2021  EXAM: XR CHEST PORT CLINICAL INDICATION/HISTORY: shortness of breath -Additional: None COMPARISON: 12/7/2021 TECHNIQUE: Frontal view of the chest _______________ FINDINGS: HEART AND MEDIASTINUM: Normal cardiac size and mediastinal contours. LUNGS AND PLEURAL SPACES: Right mid to upper lung infiltrate. No  pneumothorax or pleural effusion. BONY THORAX AND SOFT TISSUES: No acute osseous abnormality _______________     Right mid to upper lung infiltrate. XR CHEST PORT    Result Date: 12/7/2021  EXAM:  PORTABLE CHEST INDICATION:  Shortness of breath. Cough. TECHNIQUE:  Portable, AP view, 2 films. COMPARISON:  11/23/2021 ____________________ FINDINGS:  SUPPORT DEVICES: None. HEART AND MEDIASTINUM: Cardiomediastinal silhouette appears within normal limits. Tortuous thoracic aorta. LUNGS AND PLEURAL SPACES: Persistent right basilar consolidation. Improving right upper lobe consolidation. No new consolidation. No pleural effusion or pneumothorax. BONY THORAX AND SOFT TISSUES: No acute osseous abnormality. ____________________     Persistent right basilar consolidation, atelectasis or pneumonia. Improving right upper lobe consolidation. *   *     ECHO ADULT COMPLETE    Result Date: 12/9/2021  · Left Ventricle: Normal cavity size, wall thickness and systolic function (ejection fraction normal). The estimated EF is 60 - 65%.  There is mild (grade 1) left ventricular diastolic dysfunction E/E' ratio = 9.66. Wall Scoring: The left ventricular wall motion is normal. · Tricuspid Valve: Normal valve structure and no stenosis. Tricuspid regurgitation is inadequate for estimation of right ventricular systolic pressure.       Procedures/imaging: see electronic medical records for all procedures/Xrays and details which were not copied into this note but were reviewed prior to creation of Vignesh Schwartz MD, Internal Medicine     CC: Cornelius Quintanilla MD

## 2021-12-23 NOTE — ED PROVIDER NOTES
7: 07 AM  Received signout from Dr. Tito Terrell. When I entered patient's room he was quite hostile. He reports that he was just here 2 days ago when he was feeling better. He reports he did not get the medications are prescribed to him 2 days ago because he could not afford them. He has audible wheezing and is tachypneic with increased work of breathing. Will order an additional DuoNeb and continue to monitor closely. Repeat EKG interpretation: (Preliminary)  EKG read by Dr. Britney Betancur at 7:28 AM  Normal sinus rhythm at a rate of 78 bpm, KY interval 142 ms, QRS duration of 84 ms, similar when compared to prior from earlier this morning    8:20 AM  Patient's work of breathing has improved but he does continue up to coarse expiratory wheezes and states he does not feel safe going home because he still feels short of breath. Will discuss with hospitalist.    CONSULT NOTE:   8:31 AM  Dr. Britney Betancur spoke with Dr. Gale Jo   Specialty: Hospitalist  Discussed pt's hx, disposition, and available diagnostic and imaging results over the telephone. Reviewed care plans. Observation on remote telemetry.

## 2021-12-23 NOTE — Clinical Note
Patient Class[de-identified] OBSERVATION [104]   Type of Bed: Remote Telemetry [29]   Cardiac Monitoring Required?: Yes   Reason for Observation: copd exacerbation   Admitting Diagnosis: COPD with acute exacerbation [facility] [[id number]]   Admitting Physician: Lele Luque [[id number]]   Attending Physician: Lele Luque [[id number]]

## 2021-12-24 LAB
ANION GAP SERPL CALC-SCNC: 7 MMOL/L (ref 3–18)
BASOPHILS # BLD: 0 K/UL (ref 0–0.1)
BASOPHILS NFR BLD: 0 % (ref 0–2)
BUN SERPL-MCNC: 15 MG/DL (ref 7–18)
BUN/CREAT SERPL: 14 (ref 12–20)
CALCIUM SERPL-MCNC: 8.2 MG/DL (ref 8.5–10.1)
CHLORIDE SERPL-SCNC: 108 MMOL/L (ref 100–111)
CO2 SERPL-SCNC: 28 MMOL/L (ref 21–32)
CREAT SERPL-MCNC: 1.11 MG/DL (ref 0.6–1.3)
DIFFERENTIAL METHOD BLD: ABNORMAL
EOSINOPHIL # BLD: 0 K/UL (ref 0–0.4)
EOSINOPHIL NFR BLD: 0 % (ref 0–5)
ERYTHROCYTE [DISTWIDTH] IN BLOOD BY AUTOMATED COUNT: 16.1 % (ref 11.6–14.5)
GLUCOSE BLD STRIP.AUTO-MCNC: 213 MG/DL (ref 70–110)
GLUCOSE BLD STRIP.AUTO-MCNC: 245 MG/DL (ref 70–110)
GLUCOSE BLD STRIP.AUTO-MCNC: 278 MG/DL (ref 70–110)
GLUCOSE BLD STRIP.AUTO-MCNC: 346 MG/DL (ref 70–110)
GLUCOSE SERPL-MCNC: 243 MG/DL (ref 74–99)
HCT VFR BLD AUTO: 37.7 % (ref 36–48)
HGB BLD-MCNC: 11.5 G/DL (ref 13–16)
IMM GRANULOCYTES # BLD AUTO: 0.1 K/UL (ref 0–0.04)
IMM GRANULOCYTES NFR BLD AUTO: 1 % (ref 0–0.5)
LYMPHOCYTES # BLD: 0.5 K/UL (ref 0.9–3.6)
LYMPHOCYTES NFR BLD: 6 % (ref 21–52)
MCH RBC QN AUTO: 29.2 PG (ref 24–34)
MCHC RBC AUTO-ENTMCNC: 30.5 G/DL (ref 31–37)
MCV RBC AUTO: 95.7 FL (ref 78–100)
MONOCYTES # BLD: 0.1 K/UL (ref 0.05–1.2)
MONOCYTES NFR BLD: 1 % (ref 3–10)
NEUTS SEG # BLD: 9.1 K/UL (ref 1.8–8)
NEUTS SEG NFR BLD: 92 % (ref 40–73)
NRBC # BLD: 0 K/UL (ref 0–0.01)
NRBC BLD-RTO: 0 PER 100 WBC
PLATELET # BLD AUTO: 192 K/UL (ref 135–420)
PMV BLD AUTO: 10.4 FL (ref 9.2–11.8)
POTASSIUM SERPL-SCNC: 4.4 MMOL/L (ref 3.5–5.5)
RBC # BLD AUTO: 3.94 M/UL (ref 4.35–5.65)
SODIUM SERPL-SCNC: 143 MMOL/L (ref 136–145)
WBC # BLD AUTO: 9.9 K/UL (ref 4.6–13.2)

## 2021-12-24 PROCEDURE — 80048 BASIC METABOLIC PNL TOTAL CA: CPT

## 2021-12-24 PROCEDURE — 36415 COLL VENOUS BLD VENIPUNCTURE: CPT

## 2021-12-24 PROCEDURE — 77010033678 HC OXYGEN DAILY

## 2021-12-24 PROCEDURE — 74011250637 HC RX REV CODE- 250/637: Performed by: HOSPITALIST

## 2021-12-24 PROCEDURE — 74011636637 HC RX REV CODE- 636/637: Performed by: HOSPITALIST

## 2021-12-24 PROCEDURE — 74011250636 HC RX REV CODE- 250/636: Performed by: HOSPITALIST

## 2021-12-24 PROCEDURE — 74011000258 HC RX REV CODE- 258: Performed by: HOSPITALIST

## 2021-12-24 PROCEDURE — 74011000250 HC RX REV CODE- 250: Performed by: HOSPITALIST

## 2021-12-24 PROCEDURE — 82962 GLUCOSE BLOOD TEST: CPT

## 2021-12-24 PROCEDURE — 65660000000 HC RM CCU STEPDOWN

## 2021-12-24 PROCEDURE — 85025 COMPLETE CBC W/AUTO DIFF WBC: CPT

## 2021-12-24 PROCEDURE — 94640 AIRWAY INHALATION TREATMENT: CPT

## 2021-12-24 PROCEDURE — 94760 N-INVAS EAR/PLS OXIMETRY 1: CPT

## 2021-12-24 RX ADMIN — BUDESONIDE 250 MCG: 0.25 INHALANT RESPIRATORY (INHALATION) at 20:04

## 2021-12-24 RX ADMIN — PIPERACILLIN AND TAZOBACTAM 3.38 G: 3; .375 INJECTION, POWDER, LYOPHILIZED, FOR SOLUTION INTRAVENOUS at 21:18

## 2021-12-24 RX ADMIN — PIPERACILLIN AND TAZOBACTAM 3.38 G: 3; .375 INJECTION, POWDER, LYOPHILIZED, FOR SOLUTION INTRAVENOUS at 04:40

## 2021-12-24 RX ADMIN — PIPERACILLIN AND TAZOBACTAM 3.38 G: 3; .375 INJECTION, POWDER, LYOPHILIZED, FOR SOLUTION INTRAVENOUS at 08:45

## 2021-12-24 RX ADMIN — DOXYCYCLINE 100 MG: 100 CAPSULE ORAL at 08:45

## 2021-12-24 RX ADMIN — CLONIDINE HYDROCHLORIDE 0.1 MG: 0.1 TABLET ORAL at 08:45

## 2021-12-24 RX ADMIN — MULTIPLE VITAMINS W/ MINERALS TAB 1 TABLET: TAB at 08:45

## 2021-12-24 RX ADMIN — ARIPIPRAZOLE 5 MG: 5 TABLET ORAL at 08:45

## 2021-12-24 RX ADMIN — METHYLPREDNISOLONE SODIUM SUCCINATE 60 MG: 125 INJECTION, POWDER, FOR SOLUTION INTRAMUSCULAR; INTRAVENOUS at 06:56

## 2021-12-24 RX ADMIN — IPRATROPIUM BROMIDE AND ALBUTEROL SULFATE 3 ML: .5; 3 SOLUTION RESPIRATORY (INHALATION) at 08:06

## 2021-12-24 RX ADMIN — CLONIDINE HYDROCHLORIDE 0.1 MG: 0.1 TABLET ORAL at 21:17

## 2021-12-24 RX ADMIN — Medication 6 UNITS: at 21:22

## 2021-12-24 RX ADMIN — METHYLPREDNISOLONE SODIUM SUCCINATE 60 MG: 125 INJECTION, POWDER, FOR SOLUTION INTRAMUSCULAR; INTRAVENOUS at 13:11

## 2021-12-24 RX ADMIN — Medication 10 ML: at 22:00

## 2021-12-24 RX ADMIN — IPRATROPIUM BROMIDE AND ALBUTEROL SULFATE 3 ML: .5; 3 SOLUTION RESPIRATORY (INHALATION) at 15:41

## 2021-12-24 RX ADMIN — METHYLPREDNISOLONE SODIUM SUCCINATE 60 MG: 125 INJECTION, POWDER, FOR SOLUTION INTRAMUSCULAR; INTRAVENOUS at 23:01

## 2021-12-24 RX ADMIN — IPRATROPIUM BROMIDE AND ALBUTEROL SULFATE 3 ML: .5; 3 SOLUTION RESPIRATORY (INHALATION) at 12:00

## 2021-12-24 RX ADMIN — INSULIN LISPRO 3 UNITS: 100 INJECTION, SOLUTION INTRAVENOUS; SUBCUTANEOUS at 08:43

## 2021-12-24 RX ADMIN — IPRATROPIUM BROMIDE AND ALBUTEROL SULFATE 3 ML: .5; 3 SOLUTION RESPIRATORY (INHALATION) at 04:48

## 2021-12-24 RX ADMIN — DABIGATRAN ETEXILATE MESYLATE 150 MG: 75 CAPSULE ORAL at 04:35

## 2021-12-24 RX ADMIN — ARFORMOTEROL TARTRATE 15 MCG: 15 SOLUTION RESPIRATORY (INHALATION) at 20:04

## 2021-12-24 RX ADMIN — PANTOPRAZOLE SODIUM 40 MG: 40 TABLET, DELAYED RELEASE ORAL at 06:56

## 2021-12-24 RX ADMIN — IPRATROPIUM BROMIDE AND ALBUTEROL SULFATE 3 ML: .5; 3 SOLUTION RESPIRATORY (INHALATION) at 23:45

## 2021-12-24 RX ADMIN — Medication 4 UNITS: at 06:56

## 2021-12-24 RX ADMIN — IPRATROPIUM BROMIDE AND ALBUTEROL SULFATE 3 ML: .5; 3 SOLUTION RESPIRATORY (INHALATION) at 20:04

## 2021-12-24 RX ADMIN — Medication 10 ML: at 06:57

## 2021-12-24 RX ADMIN — BUDESONIDE 250 MCG: 0.25 INHALANT RESPIRATORY (INHALATION) at 08:06

## 2021-12-24 RX ADMIN — Medication 8 UNITS: at 13:10

## 2021-12-24 RX ADMIN — INSULIN LISPRO 3 UNITS: 100 INJECTION, SOLUTION INTRAVENOUS; SUBCUTANEOUS at 13:10

## 2021-12-24 RX ADMIN — METHYLPREDNISOLONE SODIUM SUCCINATE 60 MG: 125 INJECTION, POWDER, FOR SOLUTION INTRAMUSCULAR; INTRAVENOUS at 17:24

## 2021-12-24 RX ADMIN — INSULIN LISPRO 3 UNITS: 100 INJECTION, SOLUTION INTRAVENOUS; SUBCUTANEOUS at 18:26

## 2021-12-24 RX ADMIN — Medication 10 UNITS: at 09:00

## 2021-12-24 RX ADMIN — DABIGATRAN ETEXILATE MESYLATE 150 MG: 75 CAPSULE ORAL at 15:32

## 2021-12-24 RX ADMIN — ARFORMOTEROL TARTRATE 15 MCG: 15 SOLUTION RESPIRATORY (INHALATION) at 08:06

## 2021-12-24 RX ADMIN — DOXYCYCLINE 100 MG: 100 CAPSULE ORAL at 21:17

## 2021-12-24 RX ADMIN — DULOXETINE HYDROCHLORIDE 20 MG: 20 CAPSULE, DELAYED RELEASE ORAL at 08:45

## 2021-12-24 RX ADMIN — PIPERACILLIN AND TAZOBACTAM 3.38 G: 3; .375 INJECTION, POWDER, LYOPHILIZED, FOR SOLUTION INTRAVENOUS at 15:32

## 2021-12-24 RX ADMIN — Medication 10 ML: at 14:48

## 2021-12-24 RX ADMIN — Medication 4 UNITS: at 18:26

## 2021-12-24 NOTE — PROGRESS NOTES
0745  Bedside and Verbal shift change report given to Diana Hernandez RN by Kaleb Polanco.[email or web address]    . Report included the following information SBAR, Kardex, OR Summary, Intake/Output and MAR.     1233  Patient's blood sugar 346. Informed by aide that patient had just finished eating lunch and he had cookies at bedside. Pt educated on importance of following diet assigned to him. Pt medicated per MAR.     1915  Bedside and Verbal shift change report given to  [address] by Diana Hernandez RN. Report included the following information SBAR, Kardex, OR Summary, Intake/Output and MAR.

## 2021-12-24 NOTE — PROGRESS NOTES
Problem: Diabetes Self-Management  Goal: *Disease process and treatment process  Description: Define diabetes and identify own type of diabetes; list 3 options for treating diabetes.   Outcome: Progressing Towards Goal     Problem: Patient Education: Go to Patient Education Activity  Goal: Patient/Family Education  Outcome: Progressing Towards Goal

## 2021-12-24 NOTE — ROUTINE PROCESS
Bedside and Verbal shift change report given to [address] (oncoming nurse) by Latasha Marvin RN (offgoing nurse). Report included the following information SBAR, Kardex, MAR and Recent Results.

## 2021-12-24 NOTE — PROGRESS NOTES
Hospitalist Progress Note    Patient: Stephanie Elizabeth MRN: [id number]  [facility]: [id number]    YOB: 1950  Age: 70 y.o.   Sex: male    DOA: 12/23/2021 LOS:  LOS: 1 day            Patient Active Problem List   Diagnosis Code    Cocaine abuse ([facility] [address] F14.10    Alcohol abuse F10.10    COPD (chronic obstructive pulmonary disease) ([address] J44.9    COPD exacerbation ([address] J44.1    Dyslipidemia E78.5    Gastroesophageal reflux disease without esophagitis K21.9    Problem related to housing and economic circumstances Z59.9    Vitamin D deficiency disease E55.9    Asthma J45.909    Chronic deep vein thrombosis (DVT) ([address] I82.509    Obstructive sleep apnea G47.33    Major depression F32.9    Hypertension I10    Anxiety B50.0    Mild diastolic dysfunction O67.3    AAA (abdominal aortic aneurysm) ([facility]) I71.4    Acute respiratory failure with hypoxia ([address] J96.01    Pneumonia J18.9    COPD with acute exacerbation ([address] J44.1    Noncompliance with CPAP treatment Z91.14    Consolidation of right lower lobe of lung ([address] J18.1    Hyperglycemia due to type 2 diabetes mellitus ([address] E11.65    Aortic ectasia, thoracic (HCC) I77.810    NEIL on CPAP G47.33, Z99.89        IMPRESSION and Plan:    Stephanie Elizabeth is a 70 y.o. male with   Patient Active Problem List    Diagnosis Date Noted    Aortic ectasia, thoracic ([address] 12/23/2021    NEIL on CPAP     Hyperglycemia due to type 2 diabetes mellitus ([address] 12/11/2021    COPD with acute exacerbation ([address] 12/07/2021    Noncompliance with CPAP treatment 12/07/2021    Consolidation of right lower lobe of lung ([address] 12/07/2021    Acute respiratory failure with hypoxia ([address] 11/23/2021    Pneumonia 11/23/2021    AAA (abdominal aortic aneurysm) ([address] 06/08/2018    Vitamin D deficiency disease 02/09/2017    Asthma     Chronic deep vein thrombosis (DVT) (HCC)     Obstructive sleep apnea     Major depression     Hypertension     Anxiety     Mild diastolic dysfunction     COPD (chronic obstructive pulmonary disease) ([facility] [address] 11/22/2016    COPD exacerbation ([facility] [address] 11/22/2016    Gastroesophageal reflux disease without esophagitis 07/06/2016    Problem related to housing and economic circumstances 02/14/2016    Cocaine abuse ([facility] [address] 12/13/2015    Alcohol abuse 12/13/2015    Dyslipidemia 01/06/2015     Principal Problem:    COPD exacerbation ([address] (11/22/2016)    Active Problems:    Cocaine abuse ([facility] (12/13/2015)      Hypertension ()      Anxiety ()      COPD with acute exacerbation ([facility] [address] (12/7/2021)      Aortic ectasia, thoracic ([facility] [address] (12/23/2021)      NEIL on CPAP ()            Jony Mckenzie Magaña is a 70 y. o. male   with a past medical history of COPD/asthma /emphysema, cocaine abuse, hypertension, depression, NEIL on CPAP and noncompliance to medical treatment is  admitted for COPD exacerbation.      COPD exacerbation/emphysema -- cont nebs, steroids and abx        Cocaine use/abuse -       Hypertension:   continue clonidine      NEIL on CPAP:   Nighttime CPAP     Noncompliance to medical treatment        Anxiety continue home meds      Aortic actasia , throatic   Need f/u with ct surgery      Patient's condition is fair      Recommend to continue hospitalization. Discussed with patient. Chief Complaints:   Chief Complaint   Patient presents with    Shortness of Breath     SUBJECTIVE:  Pt is seen and examined. Chart reviwed  \" I do feel little better today\"      Review of systems:    Review of Systems   Constitutional: Positive for malaise/fatigue. HENT: Negative. Eyes: Negative. Respiratory: Positive for cough, sputum production, shortness of breath and wheezing. Cardiovascular: Positive for leg swelling. Negative for chest pain, palpitations and orthopnea. Gastrointestinal: Negative. Negative for abdominal pain, diarrhea and heartburn. Genitourinary: Negative for dysuria and hematuria. Skin: Negative.     Neurological: Positive for weakness. Psychiatric/Behavioral: Negative for depression, substance abuse and suicidal ideas. The patient is not nervous/anxious. PE:  Patient Vitals for the past 24 hrs:   BP Temp Pulse Resp SpO2   12/24/21 0857 135/87 97.7 °F (36.5 °C) 88 20 97 %   12/24/21 0810     98 %   12/24/21 0448     97 %   12/24/21 0349 133/87 97.8 °F (36.6 °C) 87 20 97 %   12/23/21 2351     97 %   12/23/21 2344 (!) 144/92 97.8 °F (36.6 °C) 84 20 97 %   12/23/21 2053     97 %   12/23/21 1952 (!) 143/100 97.8 °F (36.6 °C) 87 20 97 %   12/23/21 1634 (!) 152/99 97.5 °F (36.4 °C) 88 20 96 %       Intake/Output Summary (Last 24 hours) at 12/24/2021 1138  Last data filed at 12/24/2021 0806  Gross per 24 hour   Intake 580 ml   Output 1725 ml   Net -1145 ml     Patient Vitals for the past 120 hrs:   Weight   12/23/21 0429 95.3 kg (210 lb)         Physical Exam  Vitals and nursing note reviewed. Constitutional:       General: He is in acute distress. Neck:      Vascular: No JVD. Cardiovascular:      Rate and Rhythm: Normal rate and regular rhythm. Heart sounds: Normal heart sounds. Pulmonary:      Effort: Respiratory distress present. Breath sounds: Wheezing present. Abdominal:      General: Bowel sounds are normal. There is no distension. Palpations: Abdomen is soft. Tenderness: There is no abdominal tenderness. There is no rebound. Musculoskeletal:         General: Normal range of motion. Cervical back: Normal range of motion and neck supple. Skin:     General: Skin is warm and dry. Neurological:      Mental Status: He is alert and oriented to person, place, and time.    Psychiatric:         Mood and Affect: Affect normal.             Intake and Output:  Current Shift:  12/24 0701 - 12/24 1900  In: 580 [P.O.:480; I.V.:100]  Out: -   Last three shifts:  12/22 1901 - 12/24 0700  In: -   Out: 2123 [Urine:1150]    Lab/Data Reviewed:  Recent Results (from the past 8 hour(s))   METABOLIC PANEL, BASIC    Collection Time: 12/24/21  4:15 AM   Result Value Ref Range    Sodium 143 136 - 145 mmol/L    Potassium 4.4 3.5 - 5.5 mmol/L    Chloride 108 100 - 111 mmol/L    CO2 28 21 - 32 mmol/L    Anion gap 7 3.0 - 18 mmol/L    Glucose 243 (H) 74 - 99 mg/dL    BUN 15 7.0 - 18 MG/DL    Creatinine 1.11 0.6 - 1.3 MG/DL    BUN/Creatinine ratio 14 12 - 20      GFR est AA >60 >60 ml/min/1.73m2    GFR est non-AA >60 >60 ml/min/1.73m2    Calcium 8.2 (L) 8.5 - 10.1 MG/DL   CBC WITH AUTOMATED DIFF    Collection Time: 12/24/21  4:15 AM   Result Value Ref Range    WBC 9.9 4.6 - 13.2 K/uL    RBC 3.94 (L) 4.35 - 5.65 M/uL    HGB 11.5 (L) 13.0 - 16.0 g/dL    HCT 37.7 36.0 - 48.0 %    MCV 95.7 78.0 - 100.0 FL    MCH 29.2 24.0 - 34.0 PG    MCHC 30.5 (L) 31.0 - 37.0 g/dL    RDW 16.1 (H) 11.6 - 14.5 %    PLATELET 652 175 - 956 K/uL    MPV 10.4 9.2 - 11.8 FL    NRBC 0.0 0  WBC    ABSOLUTE NRBC 0.00 0.00 - 0.01 K/uL    NEUTROPHILS 92 (H) 40 - 73 %    LYMPHOCYTES 6 (L) 21 - 52 %    MONOCYTES 1 (L) 3 - 10 %    EOSINOPHILS 0 0 - 5 %    BASOPHILS 0 0 - 2 %    IMMATURE GRANULOCYTES 1 (H) 0.0 - 0.5 %    ABS. NEUTROPHILS 9.1 (H) 1.8 - 8.0 K/UL    ABS. LYMPHOCYTES 0.5 (L) 0.9 - 3.6 K/UL    ABS. MONOCYTES 0.1 0.05 - 1.2 K/UL    ABS. EOSINOPHILS 0.0 0.0 - 0.4 K/UL    ABS. BASOPHILS 0.0 0.0 - 0.1 K/UL    ABS. IMM.  GRANS. 0.1 (H) 0.00 - 0.04 K/UL    DF AUTOMATED     GLUCOSE, POC    Collection Time: 12/24/21  6:09 AM   Result Value Ref Range    Glucose (POC) 213 (H) 70 - 110 mg/dL     Medications:  Current Facility-Administered Medications   Medication Dose Route Frequency    budesonide (PULMICORT) 250 mcg/2ml nebulizer susp  250 mcg Nebulization BID RT    arformoteroL (BROVANA) neb solution 15 mcg  15 mcg Nebulization BID RT    methylPREDNISolone (PF) (Solu-MEDROL) injection 60 mg  60 mg IntraVENous Q6H    sodium chloride (NS) flush 5-40 mL  5-40 mL IntraVENous Q8H    sodium chloride (NS) flush 5-40 mL  5-40 mL IntraVENous PRN    acetaminophen (TYLENOL) tablet 650 mg  650 mg Oral Q6H PRN    Or    acetaminophen (TYLENOL) suppository 650 mg  650 mg Rectal Q6H PRN    bisacodyL (DULCOLAX) suppository 10 mg  10 mg Rectal DAILY PRN    promethazine (PHENERGAN) tablet 12.5 mg  12.5 mg Oral Q6H PRN    Or    ondansetron (ZOFRAN) injection 4 mg  4 mg IntraVENous Q6H PRN    ARIPiprazole (ABILIFY) tablet 5 mg  5 mg Oral DAILY    dabigatran etexilate (PRADAXA) capsule 150 mg  150 mg Oral Q12H    DULoxetine (CYMBALTA) capsule 20 mg  20 mg Oral DAILY    pantoprazole (PROTONIX) tablet 40 mg  40 mg Oral ACB    multivitamin, tx-iron-ca-min (THERA-M w/ IRON) tablet 1 Tablet  1 Tablet Oral DAILY    piperacillin-tazobactam (ZOSYN) 3.375 g in 0.9% sodium chloride (MBP/ADV) 100 mL MBP  3.375 g IntraVENous Q6H    albuterol-ipratropium (DUO-NEB) 2.5 MG-0.5 MG/3 ML  3 mL Nebulization Q4H RT    cloNIDine HCL (CATAPRES) tablet 0.1 mg  0.1 mg Oral BID    LORazepam (ATIVAN) injection 1 mg  1 mg IntraVENous Q6H PRN    insulin lispro (HUMALOG) injection   SubCUTAneous AC&HS    glucose chewable tablet 16 g  4 Tablet Oral PRN    glucagon (GLUCAGEN) injection 1 mg  1 mg IntraMUSCular PRN    dextrose (D50W) injection syrg 12.5-25 g  25-50 mL IntraVENous PRN    doxycycline (MONODOX) capsule 100 mg  100 mg Oral Q12H    insulin glargine (LANTUS) injection 10 Units  10 Units SubCUTAneous DAILY    insulin lispro (HUMALOG) injection 3 Units  3 Units SubCUTAneous TIDAC       Recent Results (from the past 24 hour(s))   GLUCOSE, POC    Collection Time: 12/23/21  5:21 PM   Result Value Ref Range    Glucose (POC) 364 (H) 70 - 110 mg/dL   GLUCOSE, POC    Collection Time: 12/23/21  6:17 PM   Result Value Ref Range    Glucose (POC) 385 (H) 70 - 110 mg/dL   GLUCOSE, POC    Collection Time: 12/23/21  9:02 PM   Result Value Ref Range    Glucose (POC) 259 (H) 70 - 883 mg/dL   METABOLIC PANEL, BASIC    Collection Time: 12/24/21  4:15 AM   Result Value Ref Range    Sodium 143 136 - 145 mmol/L    Potassium 4.4 3.5 - 5.5 mmol/L    Chloride 108 100 - 111 mmol/L    CO2 28 21 - 32 mmol/L    Anion gap 7 3.0 - 18 mmol/L    Glucose 243 (H) 74 - 99 mg/dL    BUN 15 7.0 - 18 MG/DL    Creatinine 1.11 0.6 - 1.3 MG/DL    BUN/Creatinine ratio 14 12 - 20      GFR est AA >60 >60 ml/min/1.73m2    GFR est non-AA >60 >60 ml/min/1.73m2    Calcium 8.2 (L) 8.5 - 10.1 MG/DL   CBC WITH AUTOMATED DIFF    Collection Time: 12/24/21  4:15 AM   Result Value Ref Range    WBC 9.9 4.6 - 13.2 K/uL    RBC 3.94 (L) 4.35 - 5.65 M/uL    HGB 11.5 (L) 13.0 - 16.0 g/dL    HCT 37.7 36.0 - 48.0 %    MCV 95.7 78.0 - 100.0 FL    MCH 29.2 24.0 - 34.0 PG    MCHC 30.5 (L) 31.0 - 37.0 g/dL    RDW 16.1 (H) 11.6 - 14.5 %    PLATELET 336 133 - 047 K/uL    MPV 10.4 9.2 - 11.8 FL    NRBC 0.0 0  WBC    ABSOLUTE NRBC 0.00 0.00 - 0.01 K/uL    NEUTROPHILS 92 (H) 40 - 73 %    LYMPHOCYTES 6 (L) 21 - 52 %    MONOCYTES 1 (L) 3 - 10 %    EOSINOPHILS 0 0 - 5 %    BASOPHILS 0 0 - 2 %    IMMATURE GRANULOCYTES 1 (H) 0.0 - 0.5 %    ABS. NEUTROPHILS 9.1 (H) 1.8 - 8.0 K/UL    ABS. LYMPHOCYTES 0.5 (L) 0.9 - 3.6 K/UL    ABS. MONOCYTES 0.1 0.05 - 1.2 K/UL    ABS. EOSINOPHILS 0.0 0.0 - 0.4 K/UL    ABS. BASOPHILS 0.0 0.0 - 0.1 K/UL    ABS. IMM.  GRANS. 0.1 (H) 0.00 - 0.04 K/UL    DF AUTOMATED     GLUCOSE, POC    Collection Time: 12/24/21  6:09 AM   Result Value Ref Range    Glucose (POC) 213 (H) 70 - 110 mg/dL       Procedures/imaging: see electronic medical records for all procedures/Xrays and details which were not copied into this note but were reviewed prior to creation of Naveen Fernández MD   12/24/2021, 11:38 AM

## 2021-12-25 LAB
ALBUMIN SERPL-MCNC: 2.6 G/DL (ref 3.4–5)
ALBUMIN/GLOB SERPL: 0.8 {RATIO} (ref 0.8–1.7)
ALP SERPL-CCNC: 66 U/L (ref 45–117)
ALT SERPL-CCNC: 35 U/L (ref 16–61)
ANION GAP SERPL CALC-SCNC: 10 MMOL/L (ref 3–18)
AST SERPL-CCNC: 19 U/L (ref 10–38)
BASOPHILS # BLD: 0 K/UL (ref 0–0.1)
BASOPHILS NFR BLD: 0 % (ref 0–2)
BILIRUB SERPL-MCNC: 0.3 MG/DL (ref 0.2–1)
BUN SERPL-MCNC: 14 MG/DL (ref 7–18)
BUN/CREAT SERPL: 16 (ref 12–20)
CALCIUM SERPL-MCNC: 8 MG/DL (ref 8.5–10.1)
CHLORIDE SERPL-SCNC: 108 MMOL/L (ref 100–111)
CO2 SERPL-SCNC: 23 MMOL/L (ref 21–32)
CREAT SERPL-MCNC: 0.88 MG/DL (ref 0.6–1.3)
DIFFERENTIAL METHOD BLD: ABNORMAL
EOSINOPHIL # BLD: 0 K/UL (ref 0–0.4)
EOSINOPHIL NFR BLD: 0 % (ref 0–5)
ERYTHROCYTE [DISTWIDTH] IN BLOOD BY AUTOMATED COUNT: 16.8 % (ref 11.6–14.5)
GLOBULIN SER CALC-MCNC: 3.3 G/DL (ref 2–4)
GLUCOSE BLD STRIP.AUTO-MCNC: 220 MG/DL (ref 70–110)
GLUCOSE BLD STRIP.AUTO-MCNC: 224 MG/DL (ref 70–110)
GLUCOSE BLD STRIP.AUTO-MCNC: 279 MG/DL (ref 70–110)
GLUCOSE BLD STRIP.AUTO-MCNC: 344 MG/DL (ref 70–110)
GLUCOSE BLD STRIP.AUTO-MCNC: 389 MG/DL (ref 70–110)
GLUCOSE SERPL-MCNC: 257 MG/DL (ref 74–99)
HCT VFR BLD AUTO: 37.3 % (ref 36–48)
HGB BLD-MCNC: 11.8 G/DL (ref 13–16)
IMM GRANULOCYTES # BLD AUTO: 0.1 K/UL (ref 0–0.04)
IMM GRANULOCYTES NFR BLD AUTO: 1 % (ref 0–0.5)
LYMPHOCYTES # BLD: 0.5 K/UL (ref 0.9–3.6)
LYMPHOCYTES NFR BLD: 4 % (ref 21–52)
MAGNESIUM SERPL-MCNC: 2.4 MG/DL (ref 1.6–2.6)
MCH RBC QN AUTO: 29.6 PG (ref 24–34)
MCHC RBC AUTO-ENTMCNC: 31.6 G/DL (ref 31–37)
MCV RBC AUTO: 93.5 FL (ref 78–100)
MONOCYTES # BLD: 0.2 K/UL (ref 0.05–1.2)
MONOCYTES NFR BLD: 2 % (ref 3–10)
NEUTS SEG # BLD: 10.3 K/UL (ref 1.8–8)
NEUTS SEG NFR BLD: 93 % (ref 40–73)
NRBC # BLD: 0 K/UL (ref 0–0.01)
NRBC BLD-RTO: 0 PER 100 WBC
PHOSPHATE SERPL-MCNC: 2.1 MG/DL (ref 2.5–4.9)
PLATELET # BLD AUTO: 164 K/UL (ref 135–420)
PMV BLD AUTO: 10.5 FL (ref 9.2–11.8)
POTASSIUM SERPL-SCNC: 3.9 MMOL/L (ref 3.5–5.5)
PROT SERPL-MCNC: 5.9 G/DL (ref 6.4–8.2)
RBC # BLD AUTO: 3.99 M/UL (ref 4.35–5.65)
SODIUM SERPL-SCNC: 141 MMOL/L (ref 136–145)
WBC # BLD AUTO: 11 K/UL (ref 4.6–13.2)

## 2021-12-25 PROCEDURE — 74011636637 HC RX REV CODE- 636/637: Performed by: HOSPITALIST

## 2021-12-25 PROCEDURE — 84100 ASSAY OF PHOSPHORUS: CPT

## 2021-12-25 PROCEDURE — 94640 AIRWAY INHALATION TREATMENT: CPT

## 2021-12-25 PROCEDURE — 36415 COLL VENOUS BLD VENIPUNCTURE: CPT

## 2021-12-25 PROCEDURE — 65660000000 HC RM CCU STEPDOWN

## 2021-12-25 PROCEDURE — 74011250637 HC RX REV CODE- 250/637: Performed by: HOSPITALIST

## 2021-12-25 PROCEDURE — 74011000258 HC RX REV CODE- 258: Performed by: HOSPITALIST

## 2021-12-25 PROCEDURE — 80053 COMPREHEN METABOLIC PANEL: CPT

## 2021-12-25 PROCEDURE — 94760 N-INVAS EAR/PLS OXIMETRY 1: CPT

## 2021-12-25 PROCEDURE — 74011250636 HC RX REV CODE- 250/636: Performed by: HOSPITALIST

## 2021-12-25 PROCEDURE — 83735 ASSAY OF MAGNESIUM: CPT

## 2021-12-25 PROCEDURE — 74011000250 HC RX REV CODE- 250: Performed by: HOSPITALIST

## 2021-12-25 PROCEDURE — 82962 GLUCOSE BLOOD TEST: CPT

## 2021-12-25 PROCEDURE — 74011250636 HC RX REV CODE- 250/636: Performed by: INTERNAL MEDICINE

## 2021-12-25 PROCEDURE — 85025 COMPLETE CBC W/AUTO DIFF WBC: CPT

## 2021-12-25 RX ADMIN — INSULIN LISPRO 3 UNITS: 100 INJECTION, SOLUTION INTRAVENOUS; SUBCUTANEOUS at 16:36

## 2021-12-25 RX ADMIN — DABIGATRAN ETEXILATE MESYLATE 150 MG: 75 CAPSULE ORAL at 03:43

## 2021-12-25 RX ADMIN — METHYLPREDNISOLONE SODIUM SUCCINATE 60 MG: 125 INJECTION, POWDER, FOR SOLUTION INTRAMUSCULAR; INTRAVENOUS at 05:26

## 2021-12-25 RX ADMIN — PIPERACILLIN AND TAZOBACTAM 3.38 G: 3; .375 INJECTION, POWDER, LYOPHILIZED, FOR SOLUTION INTRAVENOUS at 03:43

## 2021-12-25 RX ADMIN — PIPERACILLIN AND TAZOBACTAM 3.38 G: 3; .375 INJECTION, POWDER, LYOPHILIZED, FOR SOLUTION INTRAVENOUS at 20:52

## 2021-12-25 RX ADMIN — INSULIN LISPRO 3 UNITS: 100 INJECTION, SOLUTION INTRAVENOUS; SUBCUTANEOUS at 11:49

## 2021-12-25 RX ADMIN — IPRATROPIUM BROMIDE AND ALBUTEROL SULFATE 3 ML: .5; 3 SOLUTION RESPIRATORY (INHALATION) at 11:12

## 2021-12-25 RX ADMIN — MULTIPLE VITAMINS W/ MINERALS TAB 1 TABLET: TAB at 10:22

## 2021-12-25 RX ADMIN — PIPERACILLIN AND TAZOBACTAM 3.38 G: 3; .375 INJECTION, POWDER, LYOPHILIZED, FOR SOLUTION INTRAVENOUS at 15:18

## 2021-12-25 RX ADMIN — DOXYCYCLINE 100 MG: 100 CAPSULE ORAL at 20:53

## 2021-12-25 RX ADMIN — BUDESONIDE 250 MCG: 0.25 INHALANT RESPIRATORY (INHALATION) at 07:29

## 2021-12-25 RX ADMIN — METHYLPREDNISOLONE SODIUM SUCCINATE 60 MG: 125 INJECTION, POWDER, FOR SOLUTION INTRAMUSCULAR; INTRAVENOUS at 20:53

## 2021-12-25 RX ADMIN — PANTOPRAZOLE SODIUM 40 MG: 40 TABLET, DELAYED RELEASE ORAL at 10:22

## 2021-12-25 RX ADMIN — ARFORMOTEROL TARTRATE 15 MCG: 15 SOLUTION RESPIRATORY (INHALATION) at 07:28

## 2021-12-25 RX ADMIN — IPRATROPIUM BROMIDE AND ALBUTEROL SULFATE 3 ML: .5; 3 SOLUTION RESPIRATORY (INHALATION) at 19:39

## 2021-12-25 RX ADMIN — IPRATROPIUM BROMIDE AND ALBUTEROL SULFATE 3 ML: .5; 3 SOLUTION RESPIRATORY (INHALATION) at 07:28

## 2021-12-25 RX ADMIN — IPRATROPIUM BROMIDE AND ALBUTEROL SULFATE 3 ML: .5; 3 SOLUTION RESPIRATORY (INHALATION) at 16:43

## 2021-12-25 RX ADMIN — CLONIDINE HYDROCHLORIDE 0.1 MG: 0.1 TABLET ORAL at 20:53

## 2021-12-25 RX ADMIN — Medication 10 UNITS: at 16:37

## 2021-12-25 RX ADMIN — Medication 10 ML: at 21:03

## 2021-12-25 RX ADMIN — DABIGATRAN ETEXILATE MESYLATE 150 MG: 75 CAPSULE ORAL at 15:18

## 2021-12-25 RX ADMIN — BUDESONIDE 250 MCG: 0.25 INHALANT RESPIRATORY (INHALATION) at 20:00

## 2021-12-25 RX ADMIN — DULOXETINE HYDROCHLORIDE 20 MG: 20 CAPSULE, DELAYED RELEASE ORAL at 10:22

## 2021-12-25 RX ADMIN — ARIPIPRAZOLE 5 MG: 5 TABLET ORAL at 10:22

## 2021-12-25 RX ADMIN — Medication 10 UNITS: at 10:22

## 2021-12-25 RX ADMIN — IPRATROPIUM BROMIDE AND ALBUTEROL SULFATE 3 ML: .5; 3 SOLUTION RESPIRATORY (INHALATION) at 03:40

## 2021-12-25 RX ADMIN — PIPERACILLIN AND TAZOBACTAM 3.38 G: 3; .375 INJECTION, POWDER, LYOPHILIZED, FOR SOLUTION INTRAVENOUS at 10:23

## 2021-12-25 RX ADMIN — CLONIDINE HYDROCHLORIDE 0.1 MG: 0.1 TABLET ORAL at 10:22

## 2021-12-25 RX ADMIN — INSULIN LISPRO 3 UNITS: 100 INJECTION, SOLUTION INTRAVENOUS; SUBCUTANEOUS at 10:22

## 2021-12-25 RX ADMIN — ARFORMOTEROL TARTRATE 15 MCG: 15 SOLUTION RESPIRATORY (INHALATION) at 19:39

## 2021-12-25 RX ADMIN — Medication 9 UNITS: at 10:30

## 2021-12-25 RX ADMIN — Medication 10 ML: at 06:00

## 2021-12-25 RX ADMIN — DOXYCYCLINE 100 MG: 100 CAPSULE ORAL at 10:22

## 2021-12-25 NOTE — ROUTINE PROCESS
Bedside and Verbal shift change report given to hallie rn (oncoming nurse) by Ernestine Cabrera (offgoing nurse). Report included the following information SBAR, Kardex, MAR and Recent Results.

## 2021-12-25 NOTE — PROGRESS NOTES
Hospitalist Progress Note    Patient: Ariadna Matamoros MRN: [id number]  [facility]: [id number]    YOB: 1950  Age: 70 y.o.   Sex: male    DOA: 12/23/2021 LOS:  LOS: 2 days            Patient Active Problem List   Diagnosis Code    Cocaine abuse ([facility] [address] F14.10    Alcohol abuse F10.10    COPD (chronic obstructive pulmonary disease) ([address] J44.9    COPD exacerbation ([address] J44.1    Dyslipidemia E78.5    Gastroesophageal reflux disease without esophagitis K21.9    Problem related to housing and economic circumstances Z59.9    Vitamin D deficiency disease E55.9    Asthma J45.909    Chronic deep vein thrombosis (DVT) ([address] I82.509    Obstructive sleep apnea G47.33    Major depression F32.9    Hypertension I10    Anxiety O50.0    Mild diastolic dysfunction V34.7    AAA (abdominal aortic aneurysm) ([facility]) I71.4    Acute respiratory failure with hypoxia ([address] J96.01    Pneumonia J18.9    COPD with acute exacerbation ([address] J44.1    Noncompliance with CPAP treatment Z91.14    Consolidation of right lower lobe of lung ([address] J18.1    Hyperglycemia due to type 2 diabetes mellitus ([address] E11.65    Aortic ectasia, thoracic ([facility]) I77.810    NEIL on CPAP G47.33, Z99.89        IMPRESSION and Plan:    Ariadna Matamoros is a 70 y.o. male with   Patient Active Problem List    Diagnosis Date Noted    Aortic ectasia, thoracic ([address] 12/23/2021    NEIL on CPAP     Hyperglycemia due to type 2 diabetes mellitus ([address] 12/11/2021    COPD with acute exacerbation ([address] 12/07/2021    Noncompliance with CPAP treatment 12/07/2021    Consolidation of right lower lobe of lung ([address] 12/07/2021    Acute respiratory failure with hypoxia ([address] 11/23/2021    Pneumonia 11/23/2021    AAA (abdominal aortic aneurysm) ([address] 06/08/2018    Vitamin D deficiency disease 02/09/2017    Asthma     Chronic deep vein thrombosis (DVT) (H[facility])     Obstructive sleep apnea     Major depression     Hypertension     Anxiety     Mild diastolic dysfunction     COPD (chronic obstructive pulmonary disease) ([facility] 11/22/2016    COPD exacerbation ([facility] [address] 11/22/2016    Gastroesophageal reflux disease without esophagitis 07/06/2016    Problem related to housing and economic circumstances 02/14/2016    Cocaine abuse ([address] 12/13/2015    Alcohol abuse 12/13/2015    Dyslipidemia 01/06/2015     Principal Problem:    COPD exacerbation ([address] (11/22/2016)    Active Problems:    Cocaine abuse ([address] (12/13/2015)      Hypertension ()      Anxiety ()      COPD with acute exacerbation ([address] (12/7/2021)      Aortic ectasia, thoracic ([address] (12/23/2021)      NEIL on CPAP ()            Vane Magaña is a 70 y. o. male   with a past medical history of COPD/asthma /emphysema, cocaine abuse, hypertension, depression, NEIL on CPAP and noncompliance to medical treatment is  admitted for COPD exacerbation.      COPD exacerbation/emphysema -- cont nebs, and abx. Decrease steroids        Cocaine use/abuse -       Hypertension:   continue clonidine      NEIL on CPAP:   Nighttime CPAP     Noncompliance to medical treatment        Anxiety continue home meds      Aortic actasia , throatic   Need f/u with ct surgery      Patient's condition is fair      Recommend to continue hospitalization. Discussed with patient. Anticipate dc in 1-2 days    Chief Complaints:   Chief Complaint   Patient presents with    Shortness of Breath     SUBJECTIVE:  Pt is seen and examined. \" I am feeling little better\"    Review of systems:    Review of Systems   Constitutional: Positive for malaise/fatigue. HENT: Negative. Eyes: Negative. Respiratory: Positive for cough, sputum production, shortness of breath and wheezing. Cardiovascular: Positive for leg swelling. Negative for chest pain, palpitations and orthopnea. Gastrointestinal: Negative. Negative for abdominal pain, diarrhea and heartburn. Genitourinary: Negative for dysuria and hematuria. Skin: Negative. Neurological: Positive for weakness. Psychiatric/Behavioral: Negative for depression, substance abuse and suicidal ideas. The patient is not nervous/anxious. PE:  Patient Vitals for the past 24 hrs:   BP Temp Pulse Resp SpO2 Weight   12/25/21 0956 (!) 147/89 97.6 °F (36.4 °C) 74 18 98 %    12/25/21 0400   82      12/25/21 0354 (!) 141/94 (!) 96.6 °F (35.9 °C) 82 20     12/25/21 0015 (!) 141/88  82 20 100 % 96.8 kg (213 lb 6.5 oz)   12/25/21 0000   70      12/24/21 2000   80      12/24/21 1946 (!) 148/82 98.3 °F (36.8 °C) 82 18 96 %    12/24/21 1637 136/80 98 °F (36.7 °C) 84 18 99 %    12/24/21 1541     98 %        Intake/Output Summary (Last 24 hours) at 12/25/2021 0959  Last data filed at 12/25/2021 0544  Gross per 24 hour   Intake 1300 ml   Output 1100 ml   Net 200 ml     Patient Vitals for the past 120 hrs:   Weight   12/23/21 0429 95.3 kg (210 lb)   12/25/21 0015 96.8 kg (213 lb 6.5 oz)         Physical Exam  Vitals and nursing note reviewed. Constitutional:       General: He is in acute distress. Neck:      Vascular: No JVD. Cardiovascular:      Rate and Rhythm: Normal rate and regular rhythm. Heart sounds: Normal heart sounds. Pulmonary:      Effort: Respiratory distress present. Breath sounds: Wheezing present. Abdominal:      General: Bowel sounds are normal. There is no distension. Palpations: Abdomen is soft. Tenderness: There is no abdominal tenderness. There is no rebound. Musculoskeletal:         General: Normal range of motion. Cervical back: Normal range of motion and neck supple. Skin:     General: Skin is warm and dry. Neurological:      Mental Status: He is alert and oriented to person, place, and time. Psychiatric:         Mood and Affect: Affect normal.             Intake and Output:  Current Shift:  No intake/output data recorded. Last three shifts:  12/23 1901 - 12/25 0700  In: 1880 [P.O.:1680;  I.V.:200]  Out: 0557 [Urine:2250]    Lab/Data Reviewed:  Recent Results (from the past 8 hour(s))   CBC WITH AUTOMATED DIFF    Collection Time: 12/25/21  5:25 AM   Result Value Ref Range    WBC 11.0 4.6 - 13.2 K/uL    RBC 3.99 (L) 4.35 - 5.65 M/uL    HGB 11.8 (L) 13.0 - 16.0 g/dL    HCT 37.3 36.0 - 48.0 %    MCV 93.5 78.0 - 100.0 FL    MCH 29.6 24.0 - 34.0 PG    MCHC 31.6 31.0 - 37.0 g/dL    RDW 16.8 (H) 11.6 - 14.5 %    PLATELET 045 848 - 618 K/uL    MPV 10.5 9.2 - 11.8 FL    NRBC 0.0 0  WBC    ABSOLUTE NRBC 0.00 0.00 - 0.01 K/uL    NEUTROPHILS 93 (H) 40 - 73 %    LYMPHOCYTES 4 (L) 21 - 52 %    MONOCYTES 2 (L) 3 - 10 %    EOSINOPHILS 0 0 - 5 %    BASOPHILS 0 0 - 2 %    IMMATURE GRANULOCYTES 1 (H) 0.0 - 0.5 %    ABS. NEUTROPHILS 10.3 (H) 1.8 - 8.0 K/UL    ABS. LYMPHOCYTES 0.5 (L) 0.9 - 3.6 K/UL    ABS. MONOCYTES 0.2 0.05 - 1.2 K/UL    ABS. EOSINOPHILS 0.0 0.0 - 0.4 K/UL    ABS. BASOPHILS 0.0 0.0 - 0.1 K/UL    ABS. IMM. GRANS. 0.1 (H) 0.00 - 0.04 K/UL    DF AUTOMATED     MAGNESIUM    Collection Time: 12/25/21  5:25 AM   Result Value Ref Range    Magnesium 2.4 1.6 - 2.6 mg/dL   METABOLIC PANEL, COMPREHENSIVE    Collection Time: 12/25/21  5:25 AM   Result Value Ref Range    Sodium 141 136 - 145 mmol/L    Potassium 3.9 3.5 - 5.5 mmol/L    Chloride 108 100 - 111 mmol/L    CO2 23 21 - 32 mmol/L    Anion gap 10 3.0 - 18 mmol/L    Glucose 257 (H) 74 - 99 mg/dL    BUN 14 7.0 - 18 MG/DL    Creatinine 0.88 0.6 - 1.3 MG/DL    BUN/Creatinine ratio 16 12 - 20      GFR est AA >60 >60 ml/min/1.73m2    GFR est non-AA >60 >60 ml/min/1.73m2    Calcium 8.0 (L) 8.5 - 10.1 MG/DL    Bilirubin, total 0.3 0.2 - 1.0 MG/DL    ALT (SGPT) 35 16 - 61 U/L    AST (SGOT) 19 10 - 38 U/L    Alk.  phosphatase 66 45 - 117 U/L    Protein, total 5.9 (L) 6.4 - 8.2 g/dL    Albumin 2.6 (L) 3.4 - 5.0 g/dL    Globulin 3.3 2.0 - 4.0 g/dL    A-G Ratio 0.8 0.8 - 1.7     PHOSPHORUS    Collection Time: 12/25/21  5:25 AM   Result Value Ref Range    Phosphorus 2.1 (L) 2.5 - 4.9 MG/DL   GLUCOSE, POC    Collection Time: 12/25/21  6:25 AM   Result Value Ref Range    Glucose (POC) 220 (H) 70 - 110 mg/dL   GLUCOSE, POC    Collection Time: 12/25/21  9:54 AM   Result Value Ref Range    Glucose (POC) 279 (H) 70 - 110 mg/dL     Medications:  Current Facility-Administered Medications   Medication Dose Route Frequency    budesonide (PULMICORT) 250 mcg/2ml nebulizer susp  250 mcg Nebulization BID RT    arformoteroL (BROVANA) neb solution 15 mcg  15 mcg Nebulization BID RT    methylPREDNISolone (PF) (Solu-MEDROL) injection 60 mg  60 mg IntraVENous Q6H    sodium chloride (NS) flush 5-40 mL  5-40 mL IntraVENous Q8H    sodium chloride (NS) flush 5-40 mL  5-40 mL IntraVENous PRN    acetaminophen (TYLENOL) tablet 650 mg  650 mg Oral Q6H PRN    Or    acetaminophen (TYLENOL) suppository 650 mg  650 mg Rectal Q6H PRN    bisacodyL (DULCOLAX) suppository 10 mg  10 mg Rectal DAILY PRN    promethazine (PHENERGAN) tablet 12.5 mg  12.5 mg Oral Q6H PRN    Or    ondansetron (ZOFRAN) injection 4 mg  4 mg IntraVENous Q6H PRN    ARIPiprazole (ABILIFY) tablet 5 mg  5 mg Oral DAILY    dabigatran etexilate (PRADAXA) capsule 150 mg  150 mg Oral Q12H    DULoxetine (CYMBALTA) capsule 20 mg  20 mg Oral DAILY    pantoprazole (PROTONIX) tablet 40 mg  40 mg Oral ACB    multivitamin, tx-iron-ca-min (THERA-M w/ IRON) tablet 1 Tablet  1 Tablet Oral DAILY    piperacillin-tazobactam (ZOSYN) 3.375 g in 0.9% sodium chloride (MBP/ADV) 100 mL MBP  3.375 g IntraVENous Q6H    albuterol-ipratropium (DUO-NEB) 2.5 MG-0.5 MG/3 ML  3 mL Nebulization Q4H RT    cloNIDine HCL (CATAPRES) tablet 0.1 mg  0.1 mg Oral BID    LORazepam (ATIVAN) injection 1 mg  1 mg IntraVENous Q6H PRN    insulin lispro (HUMALOG) injection   SubCUTAneous AC&HS    glucose chewable tablet 16 g  4 Tablet Oral PRN    glucagon (GLUCAGEN) injection 1 mg  1 mg IntraMUSCular PRN    dextrose (D50W) injection syrg 12.5-25 g  25-50 mL IntraVENous PRN    doxycycline (MONODOX) capsule 100 mg  100 mg Oral Q12H    insulin glargine (LANTUS) injection 10 Units  10 Units SubCUTAneous DAILY    insulin lispro (HUMALOG) injection 3 Units  3 Units SubCUTAneous TIDAC       Recent Results (from the past 24 hour(s))   GLUCOSE, POC    Collection Time: 12/24/21 12:32 PM   Result Value Ref Range    Glucose (POC) 346 (H) 70 - 110 mg/dL   GLUCOSE, POC    Collection Time: 12/24/21  4:39 PM   Result Value Ref Range    Glucose (POC) 245 (H) 70 - 110 mg/dL   GLUCOSE, POC    Collection Time: 12/24/21  9:17 PM   Result Value Ref Range    Glucose (POC) 278 (H) 70 - 110 mg/dL   CBC WITH AUTOMATED DIFF    Collection Time: 12/25/21  5:25 AM   Result Value Ref Range    WBC 11.0 4.6 - 13.2 K/uL    RBC 3.99 (L) 4.35 - 5.65 M/uL    HGB 11.8 (L) 13.0 - 16.0 g/dL    HCT 37.3 36.0 - 48.0 %    MCV 93.5 78.0 - 100.0 FL    MCH 29.6 24.0 - 34.0 PG    MCHC 31.6 31.0 - 37.0 g/dL    RDW 16.8 (H) 11.6 - 14.5 %    PLATELET 570 721 - 984 K/uL    MPV 10.5 9.2 - 11.8 FL    NRBC 0.0 0  WBC    ABSOLUTE NRBC 0.00 0.00 - 0.01 K/uL    NEUTROPHILS 93 (H) 40 - 73 %    LYMPHOCYTES 4 (L) 21 - 52 %    MONOCYTES 2 (L) 3 - 10 %    EOSINOPHILS 0 0 - 5 %    BASOPHILS 0 0 - 2 %    IMMATURE GRANULOCYTES 1 (H) 0.0 - 0.5 %    ABS. NEUTROPHILS 10.3 (H) 1.8 - 8.0 K/UL    ABS. LYMPHOCYTES 0.5 (L) 0.9 - 3.6 K/UL    ABS. MONOCYTES 0.2 0.05 - 1.2 K/UL    ABS. EOSINOPHILS 0.0 0.0 - 0.4 K/UL    ABS. BASOPHILS 0.0 0.0 - 0.1 K/UL    ABS. IMM.  GRANS. 0.1 (H) 0.00 - 0.04 K/UL    DF AUTOMATED     MAGNESIUM    Collection Time: 12/25/21  5:25 AM   Result Value Ref Range    Magnesium 2.4 1.6 - 2.6 mg/dL   METABOLIC PANEL, COMPREHENSIVE    Collection Time: 12/25/21  5:25 AM   Result Value Ref Range    Sodium 141 136 - 145 mmol/L    Potassium 3.9 3.5 - 5.5 mmol/L    Chloride 108 100 - 111 mmol/L    CO2 23 21 - 32 mmol/L    Anion gap 10 3.0 - 18 mmol/L    Glucose 257 (H) 74 - 99 mg/dL    BUN 14 7.0 - 18 MG/DL    Creatinine 0.88 0.6 - 1.3 MG/DL    BUN/Creatinine ratio 16 12 - 20      GFR est AA >60 >60 ml/min/1.73m2    GFR est non-AA >60 >60 ml/min/1.73m2    Calcium 8.0 (L) 8.5 - 10.1 MG/DL    Bilirubin, total 0.3 0.2 - 1.0 MG/DL    ALT (SGPT) 35 16 - 61 U/L    AST (SGOT) 19 10 - 38 U/L    Alk.  phosphatase 66 45 - 117 U/L    Protein, total 5.9 (L) 6.4 - 8.2 g/dL    Albumin 2.6 (L) 3.4 - 5.0 g/dL    Globulin 3.3 2.0 - 4.0 g/dL    A-G Ratio 0.8 0.8 - 1.7     PHOSPHORUS    Collection Time: 12/25/21  5:25 AM   Result Value Ref Range    Phosphorus 2.1 (L) 2.5 - 4.9 MG/DL   GLUCOSE, POC    Collection Time: 12/25/21  6:25 AM   Result Value Ref Range    Glucose (POC) 220 (H) 70 - 110 mg/dL   GLUCOSE, POC    Collection Time: 12/25/21  9:54 AM   Result Value Ref Range    Glucose (POC) 279 (H) 70 - 110 mg/dL       Procedures/imaging: see electronic medical records for all procedures/Xrays and details which were not copied into this note but were reviewed prior to creation of Xander Sarah MD   12/25/2021, 11:38 AM

## 2021-12-25 NOTE — PROGRESS NOTES
Care Management Interventions  PCP Verified by CM:  Yes  Mode of Transport at Discharge: Self  Transition of Care Consult (CM Consult): Discharge Planning  Support Systems: Spouse/Significant Other  Confirm Follow Up Transport: Cab  The Patient and/or Patient Representative was Provided with a Choice of Provider and Agrees with the Discharge Plan?: Yes  Name of the Patient Representative Who was Provided with a Choice of Provider and Agrees with the Discharge Plan: frannie Pat  Discharge Location  Discharge Placement: Home     Readmission Assessment  Number of days since last admission?: 8-30 days  Previous disposition: Home Alone  Who is being interviewed?: Patient  What was the patient's/caregiver's perception as to why they think they needed to return back to the hospital?: Other (Comment) (noncompliance with tx recommendation)  Did you visit your Primary Care Physician after you left the hospital, before you returned this time?: No  Did you see a specialist, such as Cardiac, Pulmonary, Orthopedic Physician, etc. after you left the hospital?: No  Who advised the patient to return to the hospital?: Self-referral  Does the patient report anything that got in the way of taking their medications?: No  In our efforts to provide the best possible care to you and others like you, can you think of anything that we could have done to help you after you left the hospital the first time, so that you might not have needed to return so soon?: Teach back instructions regarding management of illness

## 2021-12-26 LAB
ALBUMIN SERPL-MCNC: 2.5 G/DL (ref 3.4–5)
ALBUMIN/GLOB SERPL: 0.7 {RATIO} (ref 0.8–1.7)
ALP SERPL-CCNC: 61 U/L (ref 45–117)
ALT SERPL-CCNC: 35 U/L (ref 16–61)
ANION GAP SERPL CALC-SCNC: 6 MMOL/L (ref 3–18)
AST SERPL-CCNC: 14 U/L (ref 10–38)
BASOPHILS # BLD: 0 K/UL (ref 0–0.1)
BASOPHILS NFR BLD: 0 % (ref 0–2)
BILIRUB SERPL-MCNC: 0.4 MG/DL (ref 0.2–1)
BUN SERPL-MCNC: 17 MG/DL (ref 7–18)
BUN/CREAT SERPL: 17 (ref 12–20)
CALCIUM SERPL-MCNC: 8.2 MG/DL (ref 8.5–10.1)
CHLORIDE SERPL-SCNC: 110 MMOL/L (ref 100–111)
CO2 SERPL-SCNC: 27 MMOL/L (ref 21–32)
CREAT SERPL-MCNC: 0.98 MG/DL (ref 0.6–1.3)
DIFFERENTIAL METHOD BLD: ABNORMAL
EOSINOPHIL # BLD: 0 K/UL (ref 0–0.4)
EOSINOPHIL NFR BLD: 0 % (ref 0–5)
ERYTHROCYTE [DISTWIDTH] IN BLOOD BY AUTOMATED COUNT: 16.7 % (ref 11.6–14.5)
GLOBULIN SER CALC-MCNC: 3.5 G/DL (ref 2–4)
GLUCOSE BLD STRIP.AUTO-MCNC: 210 MG/DL (ref 70–110)
GLUCOSE BLD STRIP.AUTO-MCNC: 219 MG/DL (ref 70–110)
GLUCOSE BLD STRIP.AUTO-MCNC: 220 MG/DL (ref 70–110)
GLUCOSE BLD STRIP.AUTO-MCNC: 307 MG/DL (ref 70–110)
GLUCOSE SERPL-MCNC: 237 MG/DL (ref 74–99)
HCT VFR BLD AUTO: 37.8 % (ref 36–48)
HGB BLD-MCNC: 11.7 G/DL (ref 13–16)
IMM GRANULOCYTES # BLD AUTO: 0.1 K/UL (ref 0–0.04)
IMM GRANULOCYTES NFR BLD AUTO: 1 % (ref 0–0.5)
LYMPHOCYTES # BLD: 0.7 K/UL (ref 0.9–3.6)
LYMPHOCYTES NFR BLD: 7 % (ref 21–52)
MAGNESIUM SERPL-MCNC: 2.4 MG/DL (ref 1.6–2.6)
MCH RBC QN AUTO: 28.9 PG (ref 24–34)
MCHC RBC AUTO-ENTMCNC: 31 G/DL (ref 31–37)
MCV RBC AUTO: 93.3 FL (ref 78–100)
MONOCYTES # BLD: 0.2 K/UL (ref 0.05–1.2)
MONOCYTES NFR BLD: 2 % (ref 3–10)
NEUTS SEG # BLD: 9.8 K/UL (ref 1.8–8)
NEUTS SEG NFR BLD: 91 % (ref 40–73)
NRBC # BLD: 0 K/UL (ref 0–0.01)
NRBC BLD-RTO: 0 PER 100 WBC
PHOSPHATE SERPL-MCNC: 2.5 MG/DL (ref 2.5–4.9)
PLATELET # BLD AUTO: 186 K/UL (ref 135–420)
PMV BLD AUTO: 9.8 FL (ref 9.2–11.8)
POTASSIUM SERPL-SCNC: 4 MMOL/L (ref 3.5–5.5)
PROT SERPL-MCNC: 6 G/DL (ref 6.4–8.2)
RBC # BLD AUTO: 4.05 M/UL (ref 4.35–5.65)
SODIUM SERPL-SCNC: 143 MMOL/L (ref 136–145)
WBC # BLD AUTO: 10.8 K/UL (ref 4.6–13.2)

## 2021-12-26 PROCEDURE — 74011250636 HC RX REV CODE- 250/636: Performed by: INTERNAL MEDICINE

## 2021-12-26 PROCEDURE — 36415 COLL VENOUS BLD VENIPUNCTURE: CPT

## 2021-12-26 PROCEDURE — 84100 ASSAY OF PHOSPHORUS: CPT

## 2021-12-26 PROCEDURE — 74011636637 HC RX REV CODE- 636/637: Performed by: HOSPITALIST

## 2021-12-26 PROCEDURE — 85025 COMPLETE CBC W/AUTO DIFF WBC: CPT

## 2021-12-26 PROCEDURE — 94640 AIRWAY INHALATION TREATMENT: CPT

## 2021-12-26 PROCEDURE — 83735 ASSAY OF MAGNESIUM: CPT

## 2021-12-26 PROCEDURE — 74011000258 HC RX REV CODE- 258: Performed by: HOSPITALIST

## 2021-12-26 PROCEDURE — 80053 COMPREHEN METABOLIC PANEL: CPT

## 2021-12-26 PROCEDURE — 94760 N-INVAS EAR/PLS OXIMETRY 1: CPT

## 2021-12-26 PROCEDURE — 74011250636 HC RX REV CODE- 250/636: Performed by: HOSPITALIST

## 2021-12-26 PROCEDURE — 82962 GLUCOSE BLOOD TEST: CPT

## 2021-12-26 PROCEDURE — 65660000000 HC RM CCU STEPDOWN

## 2021-12-26 PROCEDURE — 74011250637 HC RX REV CODE- 250/637: Performed by: HOSPITALIST

## 2021-12-26 PROCEDURE — 74011000250 HC RX REV CODE- 250: Performed by: HOSPITALIST

## 2021-12-26 RX ADMIN — IPRATROPIUM BROMIDE AND ALBUTEROL SULFATE 3 ML: .5; 3 SOLUTION RESPIRATORY (INHALATION) at 11:15

## 2021-12-26 RX ADMIN — PIPERACILLIN AND TAZOBACTAM 3.38 G: 3; .375 INJECTION, POWDER, LYOPHILIZED, FOR SOLUTION INTRAVENOUS at 22:22

## 2021-12-26 RX ADMIN — MULTIPLE VITAMINS W/ MINERALS TAB 1 TABLET: TAB at 10:34

## 2021-12-26 RX ADMIN — BUDESONIDE 250 MCG: 0.25 INHALANT RESPIRATORY (INHALATION) at 07:16

## 2021-12-26 RX ADMIN — PIPERACILLIN AND TAZOBACTAM 3.38 G: 3; .375 INJECTION, POWDER, LYOPHILIZED, FOR SOLUTION INTRAVENOUS at 14:08

## 2021-12-26 RX ADMIN — DOXYCYCLINE 100 MG: 100 CAPSULE ORAL at 10:34

## 2021-12-26 RX ADMIN — ARFORMOTEROL TARTRATE 15 MCG: 15 SOLUTION RESPIRATORY (INHALATION) at 19:26

## 2021-12-26 RX ADMIN — IPRATROPIUM BROMIDE AND ALBUTEROL SULFATE 3 ML: .5; 3 SOLUTION RESPIRATORY (INHALATION) at 07:15

## 2021-12-26 RX ADMIN — DULOXETINE HYDROCHLORIDE 20 MG: 20 CAPSULE, DELAYED RELEASE ORAL at 10:34

## 2021-12-26 RX ADMIN — Medication 10 ML: at 05:36

## 2021-12-26 RX ADMIN — IPRATROPIUM BROMIDE AND ALBUTEROL SULFATE 3 ML: .5; 3 SOLUTION RESPIRATORY (INHALATION) at 16:13

## 2021-12-26 RX ADMIN — PIPERACILLIN AND TAZOBACTAM 3.38 G: 3; .375 INJECTION, POWDER, LYOPHILIZED, FOR SOLUTION INTRAVENOUS at 10:34

## 2021-12-26 RX ADMIN — CLONIDINE HYDROCHLORIDE 0.1 MG: 0.1 TABLET ORAL at 22:22

## 2021-12-26 RX ADMIN — CLONIDINE HYDROCHLORIDE 0.1 MG: 0.1 TABLET ORAL at 10:34

## 2021-12-26 RX ADMIN — DABIGATRAN ETEXILATE MESYLATE 150 MG: 75 CAPSULE ORAL at 14:08

## 2021-12-26 RX ADMIN — IPRATROPIUM BROMIDE AND ALBUTEROL SULFATE 3 ML: .5; 3 SOLUTION RESPIRATORY (INHALATION) at 03:21

## 2021-12-26 RX ADMIN — Medication 4 UNITS: at 22:23

## 2021-12-26 RX ADMIN — INSULIN LISPRO 3 UNITS: 100 INJECTION, SOLUTION INTRAVENOUS; SUBCUTANEOUS at 10:34

## 2021-12-26 RX ADMIN — DOXYCYCLINE 100 MG: 100 CAPSULE ORAL at 22:23

## 2021-12-26 RX ADMIN — IPRATROPIUM BROMIDE AND ALBUTEROL SULFATE 3 ML: .5; 3 SOLUTION RESPIRATORY (INHALATION) at 00:15

## 2021-12-26 RX ADMIN — BUDESONIDE 250 MCG: 0.25 INHALANT RESPIRATORY (INHALATION) at 19:31

## 2021-12-26 RX ADMIN — PIPERACILLIN AND TAZOBACTAM 3.38 G: 3; .375 INJECTION, POWDER, LYOPHILIZED, FOR SOLUTION INTRAVENOUS at 03:17

## 2021-12-26 RX ADMIN — DABIGATRAN ETEXILATE MESYLATE 150 MG: 75 CAPSULE ORAL at 03:17

## 2021-12-26 RX ADMIN — IPRATROPIUM BROMIDE AND ALBUTEROL SULFATE 3 ML: .5; 3 SOLUTION RESPIRATORY (INHALATION) at 19:26

## 2021-12-26 RX ADMIN — ARIPIPRAZOLE 5 MG: 5 TABLET ORAL at 10:34

## 2021-12-26 RX ADMIN — Medication 10 UNITS: at 10:35

## 2021-12-26 RX ADMIN — METHYLPREDNISOLONE SODIUM SUCCINATE 40 MG: 125 INJECTION, POWDER, FOR SOLUTION INTRAMUSCULAR; INTRAVENOUS at 22:23

## 2021-12-26 RX ADMIN — METHYLPREDNISOLONE SODIUM SUCCINATE 40 MG: 125 INJECTION, POWDER, FOR SOLUTION INTRAMUSCULAR; INTRAVENOUS at 10:33

## 2021-12-26 RX ADMIN — Medication 4 UNITS: at 17:01

## 2021-12-26 RX ADMIN — PANTOPRAZOLE SODIUM 40 MG: 40 TABLET, DELAYED RELEASE ORAL at 10:34

## 2021-12-26 RX ADMIN — Medication 8 UNITS: at 13:13

## 2021-12-26 RX ADMIN — ARFORMOTEROL TARTRATE 15 MCG: 15 SOLUTION RESPIRATORY (INHALATION) at 07:15

## 2021-12-26 RX ADMIN — Medication 10 ML: at 22:25

## 2021-12-26 RX ADMIN — INSULIN LISPRO 3 UNITS: 100 INJECTION, SOLUTION INTRAVENOUS; SUBCUTANEOUS at 17:01

## 2021-12-26 NOTE — PROGRESS NOTES
Hospitalist Progress Note    Patient: Armando Rojas MRN: [id number]  CSN: [id number]    YOB: 1950  Age: 70 y.o.   Sex: male    DOA: 12/23/2021 LOS:  LOS: 3 days            Patient Active Problem List   Diagnosis Code    Cocaine abuse ([facility] F14.10    Alcohol abuse F10.10    COPD (chronic obstructive pulmonary disease) ([address] J44.9    COPD exacerbation ([address] J44.1    Dyslipidemia E78.5    Gastroesophageal reflux disease without esophagitis K21.9    Problem related to housing and economic circumstances Z59.9    Vitamin D deficiency disease E55.9    Asthma J45.909    Chronic deep vein thrombosis (DVT) ([address] I82.509    Obstructive sleep apnea G47.33    Major depression F32.9    Hypertension I10    Anxiety Z46.0    Mild diastolic dysfunction S42.6    AAA (abdominal aortic aneurysm) ([facility]) I71.4    Acute respiratory failure with hypoxia ([address] J96.01    Pneumonia J18.9    COPD with acute exacerbation ([address] J44.1    Noncompliance with CPAP treatment Z91.14    Consolidation of right lower lobe of lung ([address] J18.1    Hyperglycemia due to type 2 diabetes mellitus ([address] E11.65    Aortic ectasia, thoracic ([facility]) I77.810    NEIL on CPAP G47.33, Z99.89        IMPRESSION and Plan:    Armando Rojas is a 70 y.o. male with   Patient Active Problem List    Diagnosis Date Noted    Aortic ectasia, thoracic ([address] 12/23/2021    NEIL on CPAP     Hyperglycemia due to type 2 diabetes mellitus ([address] 12/11/2021    COPD with acute exacerbation ([address] 12/07/2021    Noncompliance with CPAP treatment 12/07/2021    Consolidation of right lower lobe of lung ([address] 12/07/2021    Acute respiratory failure with hypoxia ([address] 11/23/2021    Pneumonia 11/23/2021    AAA (abdominal aortic aneurysm) ([address] 06/08/2018    Vitamin D deficiency disease 02/09/2017    Asthma     Chronic deep vein thrombosis (DVT) (HCC)     Obstructive sleep apnea     Major depression     Hypertension     Anxiety     Mild diastolic dysfunction     COPD (chronic obstructive pulmonary disease) ([facility] 11/22/2016    COPD exacerbation ([address] 11/22/2016    Gastroesophageal reflux disease without esophagitis 07/06/2016    Problem related to housing and economic circumstances 02/14/2016    Cocaine abuse ([address] 12/13/2015    Alcohol abuse 12/13/2015    Dyslipidemia 01/06/2015     Principal Problem:    COPD exacerbation ([address] (11/22/2016)    Active Problems:    Cocaine abuse ([address] (12/13/2015)      Hypertension ()      Anxiety ()      COPD with acute exacerbation ([address] (12/7/2021)      Aortic ectasia, thoracic ([address] (12/23/2021)      NEIL on CPAP ()            Monika Magaña is a 70 y. o. male   with a past medical history of COPD/asthma /emphysema, cocaine abuse, hypertension, depression, NEIL on CPAP and noncompliance to medical treatment is  admitted for COPD exacerbation.      COPD exacerbation/emphysema -- cont nebs, and abx. Decrease steroids        Cocaine use/abuse -       Hypertension:   continue clonidine      NEIL on CPAP:   Nighttime CPAP     Noncompliance to medical treatment        Anxiety continue home meds      Aortic actasia , throatic   Need f/u with ct surgery      Patient's condition is fair      Recommend to continue hospitalization. Discussed with patient. Anticipate dc tomorrow if stable  Chief Complaints:   Chief Complaint   Patient presents with    Shortness of Breath     SUBJECTIVE:  Pt is seen and examined. Feels better. Less cough. Review of systems:    Review of Systems   Constitutional: Positive for malaise/fatigue. HENT: Negative. Eyes: Negative. Respiratory: Positive for cough, sputum production and wheezing. Negative for shortness of breath. Cardiovascular: Positive for leg swelling. Negative for chest pain, palpitations and orthopnea. Gastrointestinal: Negative. Negative for abdominal pain, diarrhea and heartburn. Genitourinary: Negative for dysuria and hematuria.    Skin: Negative. Neurological: Positive for weakness. Psychiatric/Behavioral: Negative for depression, substance abuse and suicidal ideas. The patient is not nervous/anxious. PE:  Patient Vitals for the past 24 hrs:   BP Temp Pulse Resp SpO2   12/26/21 0724 (!) 161/97 97.6 °F (36.4 °C) 76 18 100 %   12/26/21 0422 (!) 175/101 97.8 °F (36.6 °C) 87 18 97 %   12/26/21 0400   81     12/26/21 0015     98 %   12/26/21 0013 (!) 154/99 97.3 °F (36.3 °C) 73 18 97 %   12/26/21 0000   80     12/25/21 2058 (!) 152/92 98 °F (36.7 °C) 80 18 98 %   12/25/21 2000   75     12/25/21 1556 (!) 151/91 97.6 °F (36.4 °C) 86 16 97 %   12/25/21 1140 (!) 149/81 97.9 °F (36.6 °C) 84 16 99 %   12/25/21 1112     96 %   12/25/21 0956 (!) 147/89 97.6 °F (36.4 °C) 74 18 98 %       Intake/Output Summary (Last 24 hours) at 12/26/2021 0819  Last data filed at 12/26/2021 0013  Gross per 24 hour   Intake    Output 550 ml   Net -550 ml     Patient Vitals for the past 120 hrs:   Weight   12/23/21 0429 95.3 kg (210 lb)   12/25/21 0015 96.8 kg (213 lb 6.5 oz)         Physical Exam  Vitals and nursing note reviewed. Constitutional:       General: He is in acute distress. Neck:      Vascular: No JVD. Cardiovascular:      Rate and Rhythm: Normal rate and regular rhythm. Heart sounds: Normal heart sounds. Pulmonary:      Effort: Respiratory distress present. Breath sounds: Wheezing present. Abdominal:      General: Bowel sounds are normal. There is no distension. Palpations: Abdomen is soft. Tenderness: There is no abdominal tenderness. There is no rebound. Musculoskeletal:         General: Normal range of motion. Cervical back: Normal range of motion and neck supple. Skin:     General: Skin is warm and dry. Neurological:      Mental Status: He is alert and oriented to person, place, and time.    Psychiatric:         Mood and Affect: Affect normal.             Intake and Output:  Current Shift: No intake/output data recorded. Last three shifts:  12/24 1901 - 12/26 0700  In: 480 [P.O.:480]  Out: 850 [Urine:850]    Lab/Data Reviewed:  Recent Results (from the past 8 hour(s))   CBC WITH AUTOMATED DIFF    Collection Time: 12/26/21  4:15 AM   Result Value Ref Range    WBC 10.8 4.6 - 13.2 K/uL    RBC 4.05 (L) 4.35 - 5.65 M/uL    HGB 11.7 (L) 13.0 - 16.0 g/dL    HCT 37.8 36.0 - 48.0 %    MCV 93.3 78.0 - 100.0 FL    MCH 28.9 24.0 - 34.0 PG    MCHC 31.0 31.0 - 37.0 g/dL    RDW 16.7 (H) 11.6 - 14.5 %    PLATELET 685 842 - 521 K/uL    MPV 9.8 9.2 - 11.8 FL    NRBC 0.0 0  WBC    ABSOLUTE NRBC 0.00 0.00 - 0.01 K/uL    NEUTROPHILS 91 (H) 40 - 73 %    LYMPHOCYTES 7 (L) 21 - 52 %    MONOCYTES 2 (L) 3 - 10 %    EOSINOPHILS 0 0 - 5 %    BASOPHILS 0 0 - 2 %    IMMATURE GRANULOCYTES 1 (H) 0.0 - 0.5 %    ABS. NEUTROPHILS 9.8 (H) 1.8 - 8.0 K/UL    ABS. LYMPHOCYTES 0.7 (L) 0.9 - 3.6 K/UL    ABS. MONOCYTES 0.2 0.05 - 1.2 K/UL    ABS. EOSINOPHILS 0.0 0.0 - 0.4 K/UL    ABS. BASOPHILS 0.0 0.0 - 0.1 K/UL    ABS. IMM. GRANS. 0.1 (H) 0.00 - 0.04 K/UL    DF AUTOMATED     MAGNESIUM    Collection Time: 12/26/21  4:15 AM   Result Value Ref Range    Magnesium 2.4 1.6 - 2.6 mg/dL   METABOLIC PANEL, COMPREHENSIVE    Collection Time: 12/26/21  4:15 AM   Result Value Ref Range    Sodium 143 136 - 145 mmol/L    Potassium 4.0 3.5 - 5.5 mmol/L    Chloride 110 100 - 111 mmol/L    CO2 27 21 - 32 mmol/L    Anion gap 6 3.0 - 18 mmol/L    Glucose 237 (H) 74 - 99 mg/dL    BUN 17 7.0 - 18 MG/DL    Creatinine 0.98 0.6 - 1.3 MG/DL    BUN/Creatinine ratio 17 12 - 20      GFR est AA >60 >60 ml/min/1.73m2    GFR est non-AA >60 >60 ml/min/1.73m2    Calcium 8.2 (L) 8.5 - 10.1 MG/DL    Bilirubin, total 0.4 0.2 - 1.0 MG/DL    ALT (SGPT) 35 16 - 61 U/L    AST (SGOT) 14 10 - 38 U/L    Alk.  phosphatase 61 45 - 117 U/L    Protein, total 6.0 (L) 6.4 - 8.2 g/dL    Albumin 2.5 (L) 3.4 - 5.0 g/dL    Globulin 3.5 2.0 - 4.0 g/dL    A-G Ratio 0.7 (L) 0.8 - 1.7 PHOSPHORUS    Collection Time: 12/26/21  4:15 AM   Result Value Ref Range    Phosphorus 2.5 2.5 - 4.9 MG/DL   GLUCOSE, POC    Collection Time: 12/26/21  6:10 AM   Result Value Ref Range    Glucose (POC) 210 (H) 70 - 110 mg/dL     Medications:  Current Facility-Administered Medications   Medication Dose Route Frequency    methylPREDNISolone (PF) (Solu-MEDROL) injection 60 mg  60 mg IntraVENous Q12H    budesonide (PULMICORT) 250 mcg/2ml nebulizer susp  250 mcg Nebulization BID RT    arformoteroL (BROVANA) neb solution 15 mcg  15 mcg Nebulization BID RT    sodium chloride (NS) flush 5-40 mL  5-40 mL IntraVENous Q8H    sodium chloride (NS) flush 5-40 mL  5-40 mL IntraVENous PRN    acetaminophen (TYLENOL) tablet 650 mg  650 mg Oral Q6H PRN    Or    acetaminophen (TYLENOL) suppository 650 mg  650 mg Rectal Q6H PRN    bisacodyL (DULCOLAX) suppository 10 mg  10 mg Rectal DAILY PRN    promethazine (PHENERGAN) tablet 12.5 mg  12.5 mg Oral Q6H PRN    Or    ondansetron (ZOFRAN) injection 4 mg  4 mg IntraVENous Q6H PRN    ARIPiprazole (ABILIFY) tablet 5 mg  5 mg Oral DAILY    dabigatran etexilate (PRADAXA) capsule 150 mg  150 mg Oral Q12H    DULoxetine (CYMBALTA) capsule 20 mg  20 mg Oral DAILY    pantoprazole (PROTONIX) tablet 40 mg  40 mg Oral ACB    multivitamin, tx-iron-ca-min (THERA-M w/ IRON) tablet 1 Tablet  1 Tablet Oral DAILY    piperacillin-tazobactam (ZOSYN) 3.375 g in 0.9% sodium chloride (MBP/ADV) 100 mL MBP  3.375 g IntraVENous Q6H    albuterol-ipratropium (DUO-NEB) 2.5 MG-0.5 MG/3 ML  3 mL Nebulization Q4H RT    cloNIDine HCL (CATAPRES) tablet 0.1 mg  0.1 mg Oral BID    LORazepam (ATIVAN) injection 1 mg  1 mg IntraVENous Q6H PRN    insulin lispro (HUMALOG) injection   SubCUTAneous AC&HS    glucose chewable tablet 16 g  4 Tablet Oral PRN    glucagon (GLUCAGEN) injection 1 mg  1 mg IntraMUSCular PRN    dextrose (D50W) injection syrg 12.5-25 g  25-50 mL IntraVENous PRN    doxycycline (MONODOX) capsule 100 mg  100 mg Oral Q12H    insulin glargine (LANTUS) injection 10 Units  10 Units SubCUTAneous DAILY    insulin lispro (HUMALOG) injection 3 Units  3 Units SubCUTAneous TIDAC       Recent Results (from the past 24 hour(s))   GLUCOSE, POC    Collection Time: 12/25/21  9:54 AM   Result Value Ref Range    Glucose (POC) 279 (H) 70 - 110 mg/dL   GLUCOSE, POC    Collection Time: 12/25/21 11:40 AM   Result Value Ref Range    Glucose (POC) 344 (H) 70 - 110 mg/dL   GLUCOSE, POC    Collection Time: 12/25/21  4:30 PM   Result Value Ref Range    Glucose (POC) 389 (H) 70 - 110 mg/dL   GLUCOSE, POC    Collection Time: 12/25/21  9:25 PM   Result Value Ref Range    Glucose (POC) 224 (H) 70 - 110 mg/dL   CBC WITH AUTOMATED DIFF    Collection Time: 12/26/21  4:15 AM   Result Value Ref Range    WBC 10.8 4.6 - 13.2 K/uL    RBC 4.05 (L) 4.35 - 5.65 M/uL    HGB 11.7 (L) 13.0 - 16.0 g/dL    HCT 37.8 36.0 - 48.0 %    MCV 93.3 78.0 - 100.0 FL    MCH 28.9 24.0 - 34.0 PG    MCHC 31.0 31.0 - 37.0 g/dL    RDW 16.7 (H) 11.6 - 14.5 %    PLATELET 298 281 - 990 K/uL    MPV 9.8 9.2 - 11.8 FL    NRBC 0.0 0  WBC    ABSOLUTE NRBC 0.00 0.00 - 0.01 K/uL    NEUTROPHILS 91 (H) 40 - 73 %    LYMPHOCYTES 7 (L) 21 - 52 %    MONOCYTES 2 (L) 3 - 10 %    EOSINOPHILS 0 0 - 5 %    BASOPHILS 0 0 - 2 %    IMMATURE GRANULOCYTES 1 (H) 0.0 - 0.5 %    ABS. NEUTROPHILS 9.8 (H) 1.8 - 8.0 K/UL    ABS. LYMPHOCYTES 0.7 (L) 0.9 - 3.6 K/UL    ABS. MONOCYTES 0.2 0.05 - 1.2 K/UL    ABS. EOSINOPHILS 0.0 0.0 - 0.4 K/UL    ABS. BASOPHILS 0.0 0.0 - 0.1 K/UL    ABS. IMM.  GRANS. 0.1 (H) 0.00 - 0.04 K/UL    DF AUTOMATED     MAGNESIUM    Collection Time: 12/26/21  4:15 AM   Result Value Ref Range    Magnesium 2.4 1.6 - 2.6 mg/dL   METABOLIC PANEL, COMPREHENSIVE    Collection Time: 12/26/21  4:15 AM   Result Value Ref Range    Sodium 143 136 - 145 mmol/L    Potassium 4.0 3.5 - 5.5 mmol/L    Chloride 110 100 - 111 mmol/L    CO2 27 21 - 32 mmol/L    Anion gap 6 3.0 - 18 mmol/L    Glucose 237 (H) 74 - 99 mg/dL    BUN 17 7.0 - 18 MG/DL    Creatinine 0.98 0.6 - 1.3 MG/DL    BUN/Creatinine ratio 17 12 - 20      GFR est AA >60 >60 ml/min/1.73m2    GFR est non-AA >60 >60 ml/min/1.73m2    Calcium 8.2 (L) 8.5 - 10.1 MG/DL    Bilirubin, total 0.4 0.2 - 1.0 MG/DL    ALT (SGPT) 35 16 - 61 U/L    AST (SGOT) 14 10 - 38 U/L    Alk.  phosphatase 61 45 - 117 U/L    Protein, total 6.0 (L) 6.4 - 8.2 g/dL    Albumin 2.5 (L) 3.4 - 5.0 g/dL    Globulin 3.5 2.0 - 4.0 g/dL    A-G Ratio 0.7 (L) 0.8 - 1.7     PHOSPHORUS    Collection Time: 12/26/21  4:15 AM   Result Value Ref Range    Phosphorus 2.5 2.5 - 4.9 MG/DL   GLUCOSE, POC    Collection Time: 12/26/21  6:10 AM   Result Value Ref Range    Glucose (POC) 210 (H) 70 - 110 mg/dL       Procedures/imaging: see electronic medical records for all procedures/Xrays and details which were not copied into this note but were reviewed prior to creation of Reffatoumata Gaspar MD   12/26/2021, 11:38 AM

## 2021-12-27 LAB
GLUCOSE BLD STRIP.AUTO-MCNC: 149 MG/DL (ref 70–110)
GLUCOSE BLD STRIP.AUTO-MCNC: 193 MG/DL (ref 70–110)
GLUCOSE BLD STRIP.AUTO-MCNC: 277 MG/DL (ref 70–110)
GLUCOSE BLD STRIP.AUTO-MCNC: 279 MG/DL (ref 70–110)

## 2021-12-27 PROCEDURE — 74011250636 HC RX REV CODE- 250/636: Performed by: INTERNAL MEDICINE

## 2021-12-27 PROCEDURE — 74011250637 HC RX REV CODE- 250/637: Performed by: HOSPITALIST

## 2021-12-27 PROCEDURE — 74011000258 HC RX REV CODE- 258: Performed by: HOSPITALIST

## 2021-12-27 PROCEDURE — 65660000000 HC RM CCU STEPDOWN

## 2021-12-27 PROCEDURE — 94640 AIRWAY INHALATION TREATMENT: CPT

## 2021-12-27 PROCEDURE — 74011250636 HC RX REV CODE- 250/636: Performed by: HOSPITALIST

## 2021-12-27 PROCEDURE — 74011636637 HC RX REV CODE- 636/637: Performed by: HOSPITALIST

## 2021-12-27 PROCEDURE — 74011000250 HC RX REV CODE- 250: Performed by: HOSPITALIST

## 2021-12-27 PROCEDURE — 82962 GLUCOSE BLOOD TEST: CPT

## 2021-12-27 PROCEDURE — 94760 N-INVAS EAR/PLS OXIMETRY 1: CPT

## 2021-12-27 RX ORDER — HYDRALAZINE HYDROCHLORIDE 25 MG/1
25 TABLET, FILM COATED ORAL 3 TIMES DAILY
Status: DISCONTINUED | OUTPATIENT
Start: 2021-12-27 | End: 2021-12-28 | Stop reason: HOSPADM

## 2021-12-27 RX ORDER — AMOXICILLIN AND CLAVULANATE POTASSIUM 875; 125 MG/1; MG/1
1 TABLET, FILM COATED ORAL 2 TIMES DAILY WITH MEALS
Status: DISCONTINUED | OUTPATIENT
Start: 2021-12-27 | End: 2021-12-28 | Stop reason: HOSPADM

## 2021-12-27 RX ADMIN — Medication 10 ML: at 21:59

## 2021-12-27 RX ADMIN — PANTOPRAZOLE SODIUM 40 MG: 40 TABLET, DELAYED RELEASE ORAL at 06:49

## 2021-12-27 RX ADMIN — DOXYCYCLINE 100 MG: 100 CAPSULE ORAL at 08:51

## 2021-12-27 RX ADMIN — Medication 10 ML: at 15:40

## 2021-12-27 RX ADMIN — INSULIN LISPRO 3 UNITS: 100 INJECTION, SOLUTION INTRAVENOUS; SUBCUTANEOUS at 17:37

## 2021-12-27 RX ADMIN — Medication 5 UNITS: at 06:49

## 2021-12-27 RX ADMIN — IPRATROPIUM BROMIDE AND ALBUTEROL SULFATE 3 ML: .5; 3 SOLUTION RESPIRATORY (INHALATION) at 11:58

## 2021-12-27 RX ADMIN — METHYLPREDNISOLONE SODIUM SUCCINATE 40 MG: 125 INJECTION, POWDER, FOR SOLUTION INTRAMUSCULAR; INTRAVENOUS at 08:51

## 2021-12-27 RX ADMIN — PIPERACILLIN AND TAZOBACTAM 3.38 G: 3; .375 INJECTION, POWDER, LYOPHILIZED, FOR SOLUTION INTRAVENOUS at 08:52

## 2021-12-27 RX ADMIN — Medication 6 UNITS: at 12:52

## 2021-12-27 RX ADMIN — ARFORMOTEROL TARTRATE 15 MCG: 15 SOLUTION RESPIRATORY (INHALATION) at 19:33

## 2021-12-27 RX ADMIN — IPRATROPIUM BROMIDE AND ALBUTEROL SULFATE 3 ML: .5; 3 SOLUTION RESPIRATORY (INHALATION) at 19:33

## 2021-12-27 RX ADMIN — HYDRALAZINE HYDROCHLORIDE 25 MG: 25 TABLET, FILM COATED ORAL at 15:40

## 2021-12-27 RX ADMIN — DABIGATRAN ETEXILATE MESYLATE 150 MG: 75 CAPSULE ORAL at 15:40

## 2021-12-27 RX ADMIN — BUDESONIDE 250 MCG: 0.25 INHALANT RESPIRATORY (INHALATION) at 19:33

## 2021-12-27 RX ADMIN — DULOXETINE HYDROCHLORIDE 20 MG: 20 CAPSULE, DELAYED RELEASE ORAL at 09:00

## 2021-12-27 RX ADMIN — IPRATROPIUM BROMIDE AND ALBUTEROL SULFATE 3 ML: .5; 3 SOLUTION RESPIRATORY (INHALATION) at 03:43

## 2021-12-27 RX ADMIN — IPRATROPIUM BROMIDE AND ALBUTEROL SULFATE 3 ML: .5; 3 SOLUTION RESPIRATORY (INHALATION) at 15:52

## 2021-12-27 RX ADMIN — Medication 10 UNITS: at 08:51

## 2021-12-27 RX ADMIN — INSULIN LISPRO 3 UNITS: 100 INJECTION, SOLUTION INTRAVENOUS; SUBCUTANEOUS at 12:53

## 2021-12-27 RX ADMIN — METHYLPREDNISOLONE SODIUM SUCCINATE 40 MG: 125 INJECTION, POWDER, FOR SOLUTION INTRAMUSCULAR; INTRAVENOUS at 21:59

## 2021-12-27 RX ADMIN — DABIGATRAN ETEXILATE MESYLATE 150 MG: 75 CAPSULE ORAL at 03:27

## 2021-12-27 RX ADMIN — PIPERACILLIN AND TAZOBACTAM 3.38 G: 3; .375 INJECTION, POWDER, LYOPHILIZED, FOR SOLUTION INTRAVENOUS at 03:27

## 2021-12-27 RX ADMIN — AMOXICILLIN AND CLAVULANATE POTASSIUM 1 TABLET: 875; 125 TABLET, FILM COATED ORAL at 17:36

## 2021-12-27 RX ADMIN — ARIPIPRAZOLE 5 MG: 5 TABLET ORAL at 08:52

## 2021-12-27 RX ADMIN — BUDESONIDE 250 MCG: 0.25 INHALANT RESPIRATORY (INHALATION) at 07:42

## 2021-12-27 RX ADMIN — CLONIDINE HYDROCHLORIDE 0.1 MG: 0.1 TABLET ORAL at 08:51

## 2021-12-27 RX ADMIN — HYDRALAZINE HYDROCHLORIDE 25 MG: 25 TABLET, FILM COATED ORAL at 22:00

## 2021-12-27 RX ADMIN — ARFORMOTEROL TARTRATE 15 MCG: 15 SOLUTION RESPIRATORY (INHALATION) at 07:39

## 2021-12-27 RX ADMIN — IPRATROPIUM BROMIDE AND ALBUTEROL SULFATE 3 ML: .5; 3 SOLUTION RESPIRATORY (INHALATION) at 07:38

## 2021-12-27 RX ADMIN — INSULIN LISPRO 3 UNITS: 100 INJECTION, SOLUTION INTRAVENOUS; SUBCUTANEOUS at 06:49

## 2021-12-27 RX ADMIN — DOXYCYCLINE 100 MG: 100 CAPSULE ORAL at 22:00

## 2021-12-27 RX ADMIN — MULTIPLE VITAMINS W/ MINERALS TAB 1 TABLET: TAB at 08:51

## 2021-12-27 RX ADMIN — CLONIDINE HYDROCHLORIDE 0.1 MG: 0.1 TABLET ORAL at 22:00

## 2021-12-27 RX ADMIN — Medication 6 UNITS: at 17:36

## 2021-12-27 NOTE — DIABETES MGMT
Diabetes/ Glycemic Control Plan of Care  Recommendations:   Recommend increasing Lantus to 14 units q 24 hrs. Recommend very insulin resistant corrective scale    Assessment: Patient with POC glucose ranging 193-307 mg/dL over the last 24 hours. Patient without known history of diabetes however HgbA1c is 6.6% (11/24/21). DX:   1. SOB (shortness of breath)     2. Acute exacerbation of chronic obstructive pulmonary disease (COPD) ([facility]          Steroids:   Rx Glucocorticoids (24h ago, onward)             Start     Dose Route Frequency Ordered Stop    12/26/21 0900  methylPREDNISolone (PF) (Solu-MEDROL) injection 40 mg         40 mg IV EVERY 12 HOURS 12/26/21 0826 --              Recent Glucose Results:   Lab Results   Component Value Date/Time    GLUCPOC 277 (H) 12/27/2021 11:18 AM    GLUCPOC 193 (H) 12/27/2021 06:20 AM    GLUCPOC 219 (H) 12/26/2021 09:39 PM           Within target range (70-180mg/dL): No  Current insulin orders: 10 units Lantus, 3 units mealtime Humalog, Humalog ACHS - normal scale  Total Daily Dose previous 24 hours = 32 units (10 units Lantus + 22 units Humalog) at time of review  Current A1c:   Lab Results   Component Value Date/Time    Hemoglobin A1c 6.6 (H) 11/24/2021 03:50 AM      equivalent  to ave Blood Glucose of 143 mg/dl for 2-3 months prior to admission  Adequate glycemic control PTA: Yes  Nutrition/Diet:   Active Orders   Diet    ADULT DIET Regular     Home diabetes medications:   Key Antihyperglycemic Medications     Patient is on no antihyperglycemic meds. Plan/Goals:   Blood glucose will be within target of 70 - 180 mg/dl within 72 hours  Reinforce dietary and medication compliance at home.          Education:  [] Refer to Diabetes Education Record                       [x] Education not indicated at this time     Diaz Gomez RD  Glycemic Control Team  [phone]    Monday-Friday   9 am - 3 pm

## 2021-12-27 NOTE — DISCHARGE INSTRUCTIONS
MrSofie Crystal,   Here are a few points that we discussed that may help you recover from this COPD episode or ask on your next follow-up. 2 contributors to your COPD episode are likely related to drug use and pollen/allergies  Drug cessation: knowing that there is a social component to your use, consider talking with your friends about quitting. The [facility]) has a help-line that might support you: 2-340.111.4209  NoteBack.cophilly    Allergies: Medciations that you might try that are inexpensive and over-the-counter include:  · Cetirizine (Zyrtec)  · Loratidine (Claritin). If these medications do not seem to help your allergies, you might ask your primary care doctor if a fluticasone nasal spray might help you. Avoid Fexofenadine (Allegra), as it may cause you to cough more. High blood sugar: while you were at the [facility], you had high blood sugars. This is a side effect of some medication, but a previous blood test showed that your blood sugars are often high. Check with your primary care doctor find out your hemoglobin A1C. CPAP and Nausea: since your CPAP causes you nausea, you may want to check how well your mask fits. A poorly-fitting mask can cause nausea. Read below to try how you could wear your mask differently. Thank for letting me be involved in your care,  Sindhu Sandoval, JANIE Burroughs, MSN, RN, [facility]  Clinical Nurse Specialist    [address] [place] [address]  Office: [phone]   [address] [place] [address] Road: [phone]  [email or web address]          CPAP and Gastroesophagus Side Effects  Apr. 2, 2018  [email or web address]-[email or web address]    Some of the rare side effects of CPAP are nausea, vomiting, acid reflux, gastric distension (bloating), flatulence (passing wind or farting), and belching (burping).     These CPAP side effects can be caused by GERD  (Gastroesophageal Reflux Disease), where the valve at the end of esophagus doesnt close properly, allowing the stomach contents to leak into the throat or mouth. Generally in patients with obstructive sleep apnea, CPAP reduces GERD by eliminating the suction built up behind the blocked airway due to the chest attempting to breathe in. However in some cases, especially in patients with severe NEIL, CPAP can exacerbate GERD. If the CPAP air comes with enough high pressure and is swallowed into the esophagus, the muscular valve of the esophagus can be opened allowing the air to enter the stomach (aerophagia). Also, the stomach liquid can exit back where it came from or gives you bloating or other GI discomforts. GI problems associated with CPAP usually goes away by the esophageal muscles gradually strengthening with nightly resistance workouts introduced by Positive Air Pressure therapy. However, some patients cant escape this uncomfortable feeling because they probably have other conditions that cause air swallowing. The common solutions to eliminate aerophagia are as follows:    · Treat GERD, talk to your physician to start treatment. · Eliminate mask leak. Mask leak causes increase in pressure which could cause excess air to get into your mouth and therefore there is a higher tendency to swallow it. · Raise the head of your bed by 4 inches or more, or use more pillows to elevate your head and upper body when you sleep. A sleep wedge pillow, which are specially designed for relieving acid reflux can also be very helpful. · Alter your diet, eat light suppers and do not eat for two hours before bedtime. · Switch to a BiPAP machine instead of using the CPAP. In some cases BiPAP can be helpful in eliminating aerophagia. · Try teaching yourself to fall asleep with your tongue touching the back of your front teeth and the roof of your mouth.  When the tongue is in this placement, it helps keep the CPAP air inside the upper airway and prevents the swallowing of air.

## 2021-12-27 NOTE — PROGRESS NOTES
D/c plan: Home. Pt states he needs an Sheldahl Faes ride home provided by the hospital like his last hospitalization. Advised him that the nursing supervisor would have to approve that. Pt states his girlfriend will transport him to his f/u appts. Faxed pt's clinical information to [facility]. Pt has an appt w/ his PCP at the [place] on 1/3. Requested an appt be made for the pt by the SW team at the [place] w/ his pulmonologist; Dr. Adi Hyde. Pt's scripts are to be sent to The [address] [place] [address] at d/c. Care Management Interventions  PCP Verified by CM: Yes Aakash Hernandez at [facility]. Pt's pulmonologist is Dr. Adi Hyde at [facility]. )  Mode of Transport at Discharge: Other (see comment) (Pt states he needs an Sheldahl Faes home like he has gotten before when he d/c'd home last time. )  Transition of Care Consult (CM Consult): Other (Home)  Support Systems: Spouse/Significant Other  Confirm Follow Up Transport: Friends (The pt states his girlfriend will helpt transport him to his appts.)  The Plan for Transition of Care is Related to the Following Treatment Goals : home  The Patient and/or Patient Representative was Provided with a Choice of Provider and Agrees with the Discharge Plan?: Yes (The pt is in agreement w/ the plan. )  Name of the Patient Representative Who was Provided with a Choice of Provider and Agrees with the Discharge Plan: frannie Zimmer  Freedom of Choice List was Provided with Basic Dialogue that Supports the Patient's Individualized Plan of Care/Goals, Treatment Preferences and Shares the Quality Data Associated with the Providers?:  (n/a)  Discharge Location  Discharge Placement: Home     Copy of JOE provided to pt.

## 2021-12-27 NOTE — PROGRESS NOTES
Problem: Diabetes Self-Management  Goal: *Disease process and treatment process  Description: Define diabetes and identify own type of diabetes; list 3 options for treating diabetes. Outcome: Progressing Towards Goal  Goal: *Incorporating nutritional management into lifestyle  Description: Describe effect of type, amount and timing of food on blood glucose; list 3 methods for planning meals. Outcome: Progressing Towards Goal  Goal: *Incorporating physical activity into lifestyle  Description: State effect of exercise on blood glucose levels. Outcome: Progressing Towards Goal  Goal: *Developing strategies to promote health/change behavior  Description: Define the ABC's of diabetes; identify appropriate screenings, schedule and personal plan for screenings. Outcome: Progressing Towards Goal  Goal: *Using medications safely  Description: State effect of diabetes medications on diabetes; name diabetes medication taking, action and side effects. Outcome: Progressing Towards Goal  Goal: *Monitoring blood glucose, interpreting and using results  Description: Identify recommended blood glucose targets  and personal targets. Outcome: Progressing Towards Goal  Goal: *Prevention, detection, treatment of acute complications  Description: List symptoms of hyper- and hypoglycemia; describe how to treat low blood sugar and actions for lowering  high blood glucose level. Outcome: Progressing Towards Goal  Goal: *Prevention, detection and treatment of chronic complications  Description: Define the natural course of diabetes and describe the relationship of blood glucose levels to long term complications of diabetes.   Outcome: Progressing Towards Goal  Goal: *Developing strategies to address psychosocial issues  Description: Describe feelings about living with diabetes; identify support needed and support network  Outcome: Progressing Towards Goal  Goal: *Insulin pump training  Outcome: Progressing Towards Goal  Goal: *Sick day guidelines  Outcome: Progressing Towards Goal  Goal: *Patient Specific Goal (EDIT GOAL, INSERT TEXT)  Outcome: Progressing Towards Goal     Problem: Patient Education: Go to Patient Education Activity  Goal: Patient/Family Education  Outcome: Progressing Towards Goal     Problem: Falls - Risk of  Goal: *Absence of Falls  Description: Document Yaniv Counts Fall Risk and appropriate interventions in the flowsheet.   Outcome: Progressing Towards Goal  Note: Fall Risk Interventions:            Medication Interventions: Assess postural VS orthostatic hypotension,Patient to call before getting OOB,Teach patient to arise slowly    Elimination Interventions: Bed/chair exit alarm,Call light in reach,Toilet paper/wipes in reach              Problem: Patient Education: Go to Patient Education Activity  Goal: Patient/Family Education  Outcome: Progressing Towards Goal

## 2021-12-27 NOTE — PROGRESS NOTES
Patient in bed with call bell in reach. Patient's bed in locked/lowest position. Patient has no complaints of pain at this time.

## 2021-12-27 NOTE — PROGRESS NOTES
[facility]  Clinical Nurse Specialist Rounding Note      NAME: Sulma Foley  MRN: [id number]  AGE: 70 y.o., : 1950  DATE OF ADMISSION: 2021  Rounding Date and Time: 2021 11:25 AM  Attending Provider: Maite Quinonez MD  Reason for Admission: Shortness of Breath    Primary Diagnosis: COPD exacerbation ([facility]   Code Status: Full Code  Allergies: Allergies   Allergen Reactions    Shellfish Derived Anaphylaxis       Assessment/Plan:  1. COPD exacerbation. Likely brought on by cocaine use, but also possibly by winter seasonal allergy. Today he continues to have wheezing. He has been seen walking to and from the restroom, and he still becomes short of breath. a. Inpt med: Duo-neb prn, Brovana, Pulmicort, Solu-medrol 40 g IV q12h.   i. Hyperglycemia r/t steroid use - elevated glucose 277 this AM, but also noted HgbA1C in .   b. Outpt med: Albuterol inhaler, Symbicort, Duo-neb, Spiriva  i. Medication Education: Correctly describes use of inhaler, as well as schedule of inhalers. He denies an increase frequency of use. ii. Medication procurement: denies having difficulty obtaining medication (per ED notes, pt previously had difficulty). c. Nebulizer: functioning, in good repair. Knows how to use it.   d. CPAP: Has some difficulty cleaning equipment, and he becomes nauseated when using it  i. Nausea due to poor mask-fitting and GERD? Provided education. e. Follow-up: He has pulmonologist and PCP with the [place]. CM to assist with follow-up. f. Allergy: Year-round allergies. He does not take anything for allergies  i. May need over-the-counter medication for allergies: Zyrtec or Claritin.   g. Oxygen therapy: on Room air, tolerating well. Does not use oxygen at home.   h. Smoking cessation: does not smoke regularly, though he admits to using cocaine recently (see below). i. Recommendations to provider: Pt to follow-up with PCP for allergy and possible DM.        2. Substance use - pt admits to cocaine use. Last use on Saturday PTA (12/18). He agrees that cocaine use caused him to come here. Has a social component, but otherwise states he hadn't used coacaine in 3 years prior. a. COWS - score of 1 for yawning.   b. CIWA - score of 0.   c. Education: Provided cocaine help line; educated to encourage his friends to quit as well.   d. Recommendations to provider: Pt to follow-up with PCP for cocaine withdrawal.     3. Transition of care of older adult. a. Vaccinations: Has taken COVID, influenza, and pneumonia vaccinations  b. Appetite: good appetite, eating 2-6 times/day  c. Elimination: Voiding and BM regularly  d. Mobility: up independently with walker  e. Skin: grossly intact. f. Sleep: Sleeping well.  g. ACP: Full code. Subjective:    Chief Complaint: SoB  Verbatim: \"doing a lot better today\"    HPI: Stephanie Elizabeth is a 70 y.o. male who presents with shortness of breath. He states he came to the hospital a few days after leaving it, making this his second admission. When asked what brought him to the hospital, it is because he had difficulty breathing. His hx includes COPD, CPAP use. He denies having DM and heart problems. He was referred to this CNS by case management and nursing. Regarding his COPD, he states that he has had it since about 2008. He feels comfortable with the use of his inhalers and nebulizer. He usually is not in-and-out of the hospital, and these two couple episodes are the first times that he has had to stay extended periods of time. He feels that allergies affect him year-round, but he does not take medication that doctors do not tell him to take. He also admits to using cocaine Saturday before coming to the hospital, but he does not use it regularly. He used to be in the [facility] [place], and so his PCP and pulmonologist seem to be with the [place].       Hx:   Past Medical History:   Diagnosis Date    Alcohol abuse     Anxiety     Asthma     Bronchitis     Chronic deep vein thrombosis (DVT) (HCC) 07/2013 07/2013, 12/2014 (LLE)    Cocaine abuse ([facility]     COPD     Depression     GERD (gastroesophageal reflux disease)     Hyperlipidemia     Hypertension     Major depression     Mild diastolic dysfunction     NEIL on CPAP     Renal insufficiency     Suicidal ideation     Vitamin D deficiency           Objective:    Physical Assessment:    General: Alert and no distress. Oriented to Person, Place, Time and Situation    HEENT: PERRLA. Not hard of hearing. Respiratory: Wheezing bilateral bases    Cardiac: HR 86    GI: Bowel sounds active. : Void status is voiding well without difficulty  Skin: Skin color, texture, turgor normal. No rashes or lesions   Extremities: no edema. Radial and pedal pulses intact. Sensation intact.      Vitals:  Patient Vitals for the past 12 hrs:   Temp Pulse Resp BP SpO2   12/27/21 0744    (!) 146/102 99 %   12/27/21 0733 97.5 °F (36.4 °C) 77 16 (!) 163/110 99 %   12/27/21 0454 97.8 °F (36.6 °C) 66 18 (!) 170/101 98 %   12/27/21 0343     99 %   12/26/21 2339 97.8 °F (36.6 °C) 80 18 (!) 148/96 99 %        Pertinent labs:  Recent Results (from the past 12 hour(s))   GLUCOSE, POC    Collection Time: 12/27/21  6:20 AM   Result Value Ref Range    Glucose (POC) 193 (H) 70 - 110 mg/dL   GLUCOSE, POC    Collection Time: 12/27/21 11:18 AM   Result Value Ref Range    Glucose (POC) 277 (H) 70 - 110 mg/dL           Current Facility-Administered Medications:     methylPREDNISolone (PF) (Solu-MEDROL) injection 40 mg, 40 mg, IntraVENous, Q12H, Nilam Jones MD, 40 mg at 12/27/21 0851    budesonide (PULMICORT) 250 mcg/2ml nebulizer susp, 250 mcg, Nebulization, BID RT, Carlene Ramirez MD, 250 mcg at 12/27/21 0742    arformoteroL (BROVANA) neb solution 15 mcg, 15 mcg, Nebulization, BID RT, Carlene Ramirez MD, 15 mcg at 12/27/21 0739    sodium chloride (NS) flush 5-40 mL, 5-40 mL, IntraVENous, Q8H, Carlene Ramirez MD, 10 mL at 12/26/21 2225    sodium chloride (NS) flush 5-40 mL, 5-40 mL, IntraVENous, PRN, Cinthia Cordon MD    acetaminophen (TYLENOL) tablet 650 mg, 650 mg, Oral, Q6H PRN **OR** acetaminophen (TYLENOL) suppository 650 mg, 650 mg, Rectal, Q6H PRN, Cinthia Cordon MD    bisacodyL (DULCOLAX) suppository 10 mg, 10 mg, Rectal, DAILY PRN, Cinthia Cordon MD    promethazine (PHENERGAN) tablet 12.5 mg, 12.5 mg, Oral, Q6H PRN **OR** ondansetron (ZOFRAN) injection 4 mg, 4 mg, IntraVENous, Q6H PRN, Cinthia Cordon MD    ARIPiprazole (ABILIFY) tablet 5 mg, 5 mg, Oral, DAILY, Cinthia Cordon MD, 5 mg at 12/27/21 5721    dabigatran etexilate (PRADAXA) capsule 150 mg, 150 mg, Oral, Q12H, Cinthia Cordon MD, 150 mg at 12/27/21 0327    DULoxetine (CYMBALTA) capsule 20 mg, 20 mg, Oral, DAILY, Cinthia Cordon MD, 20 mg at 12/27/21 0900    pantoprazole (PROTONIX) tablet 40 mg, 40 mg, Oral, ACB, Cinthia Cordon MD, 40 mg at 12/27/21 4706    multivitamin, tx-iron-ca-min (THERA-M w/ IRON) tablet 1 Tablet, 1 Tablet, Oral, DAILY, Cinthia Cordon MD, 1 Tablet at 12/27/21 0851    piperacillin-tazobactam (ZOSYN) 3.375 g in 0.9% sodium chloride (MBP/ADV) 100 mL MBP, 3.375 g, IntraVENous, Q6H, Cinthia Cordon MD, Last Rate: 200 mL/hr at 12/27/21 0852, 3.375 g at 12/27/21 0852    albuterol-ipratropium (DUO-NEB) 2.5 MG-0.5 MG/3 ML, 3 mL, Nebulization, Q4H RT, Cinthia Cordon MD, 3 mL at 12/27/21 1794    cloNIDine HCL (CATAPRES) tablet 0.1 mg, 0.1 mg, Oral, BID, Cinthia Cordon MD, 0.1 mg at 12/27/21 0851    LORazepam (ATIVAN) injection 1 mg, 1 mg, IntraVENous, Q6H PRN, Cinthia Cordon MD    insulin lispro (HUMALOG) injection, , SubCUTAneous, AC&HS, Cinthia Cordon MD, 5 Units at 12/27/21 8729    glucose chewable tablet 16 g, 4 Tablet, Oral, PRN, Cinthia Cordon MD    glucagon [place] [facility]) injection 1 mg, 1 mg, IntraMUSCular, PRN, Cinthia Cordon MD    dextrose (D50W) injection syrg 12.5-25 g, 25-50 mL, IntraVENous, PRN, Cinthia Cordon MD    doxycycline (MONODOX) capsule 100 mg, 100 mg, Oral, Q12H, Cinthia Cordon MD, 100 mg at 12/27/21 0851    insulin glargine (LANTUS) injection 10 Units, 10 Units, SubCUTAneous, DAILY, Malachi Spencer MD, 10 Units at 12/27/21 0851    insulin lispro (HUMALOG) injection 3 Units, 3 Units, SubCUTAneous, Stacey Ayers, Malachi Spencer MD, 3 Units at 12/27/21 5225          Dayton Antoine, MSN, RN, [facility]    Clinical Nurse Specialist  [address] [facility]  Office: [phone] [address]: [phone]

## 2021-12-27 NOTE — PROGRESS NOTES
Bedside and Verbal shift change report given to Miya Severino RN (oncoming nurse) by Hemal Adkins (offgoing nurse). Report included the following information SBAR, Kardex, ED Summary, Intake/Output, MAR, Recent Results, Med Rec Status and Cardiac Rhythm NSR.     1950 Patient assessment completed. Patient is resting quietly with eyes wide open and chest rising and falling evenly. No signs of pain or complaints of discomfort. Call bell is within reach.

## 2021-12-27 NOTE — PROGRESS NOTES
Hospitalist Progress Note    Patient: Reanna Barnes MRN: [id number]  CSN: [id number]    YOB: 1950  Age: 70 y.o. Sex: male    DOA: 12/23/2021 LOS:  LOS: 4 days          Chief Complaint:    SOB      Assessment/Plan     Hwang Simon Magaña is a 70 y. o. male   with a past medical history of COPD/asthma /emphysema, cocaine abuse, hypertension, depression, NEIL on CPAP and noncompliance to medical treatment is  admitted for COPD exacerbation.      COPD exacerbation-improved, of 02, still some wheezing, continue solumedrol, nebs and abx     Cocaine use, for uncontrolled HTN will add hydralazine to avoid beta blocker      NEIL on CPAP     Noncompliance to medical treatment    Hyperglycemia with steroids-basal and SS insulins     Anxiety and depression-continue home meds      Aortic actasia , thoracic  Need f/u with ct surgery     Continue abx for bronchitis, early pneumonia    Expected d/c tomorrow if continues to improve          Disposition :  Patient Active Problem List   Diagnosis Code    Cocaine abuse ([facility] F14.10    Alcohol abuse F10.10    COPD (chronic obstructive pulmonary disease) ([facility] J44.9    COPD exacerbation ([facility] J44.1    Dyslipidemia E78.5    Gastroesophageal reflux disease without esophagitis K21.9    Problem related to housing and economic circumstances Z59.9    Vitamin D deficiency disease E55.9    Asthma J45.909    Chronic deep vein thrombosis (DVT) ([facility] I82.509    Obstructive sleep apnea G47.33    Major depression F32.9    Hypertension I10    Anxiety G45.7    Mild diastolic dysfunction G34.0    AAA (abdominal aortic aneurysm) (HCC) I71.4    Acute respiratory failure with hypoxia ([facility]) J96.01    Pneumonia J18.9    COPD with acute exacerbation ([facility] J44.1    Noncompliance with CPAP treatment Z91.14    Consolidation of right lower lobe of lung ([facility] J18.1    Hyperglycemia due to type 2 diabetes mellitus ([facility] E11.65    Aortic ectasia, thoracic ([facility] I77.810    NEIL on CPAP G47.33, Z99.89       Subjective:    Still wheezing some but states he feels much better overall    Review of systems:    Constitutional: denies fevers, chills, myalgias  Respiratory: dry cough  Cardiovascular: denies chest pain, palpitations  Gastrointestinal: denies nausea, vomiting, diarrhea      Vital signs/Intake and Output:  Visit Vitals  BP (!) 146/102 (BP 1 Location: Left upper arm)   Pulse 66   Temp 97.5 °F (36.4 °C)   Resp 16   Ht 6' 1\" (1.854 m)   Wt 96.8 kg (213 lb 6.5 oz)   SpO2 99%   BMI 28.16 kg/m²     Current Shift:  12/27 0701 - 12/27 1900  In: 120 [P.O.:120]  Out: -   Last three shifts:  12/25 1901 - 12/27 0700  In: 480 [P.O.:480]  Out: 1200 [Urine:1200]    Exam:    General: Well developed, alert, NAD, OX3  Head/Neck: NCAT, supple, No masses, No lymphadenopathy  CVS:Regular rate and rhythm, no M/R/G, S1/S2 heard, no thrill  Lungs:wheezes bilateral, predominantly exp  Abdomen: Soft, Nontender, No distention, Normal Bowel sounds  Extremities: No C/C/E, pulses palpable 2+  Neuro:grossly normal , follows commands  Psych:appropriate                Labs: Results:       Chemistry Recent Labs     12/26/21 0415 12/25/21  0525   * 257*    141   K 4.0 3.9    108   CO2 27 23   BUN 17 14   CREA 0.98 0.88   CA 8.2* 8.0*   AGAP 6 10   BUCR 17 16   AP 61 66   TP 6.0* 5.9*   ALB 2.5* 2.6*   GLOB 3.5 3.3   AGRAT 0.7* 0.8      CBC w/Diff Recent Labs     12/26/21 0415 12/25/21  0525   WBC 10.8 11.0   RBC 4.05* 3.99*   HGB 11.7* 11.8*   HCT 37.8 37.3    164   GRANS 91* 93*   LYMPH 7* 4*   EOS 0 0      Cardiac Enzymes No results for input(s): CPK, CKND1, RENU in the last 72 hours. No lab exists for component: CKRMB, TROIP   Coagulation No results for input(s): PTP, INR, APTT, INREXT in the last 72 hours.     Lipid Panel Lab Results   Component Value Date/Time    Cholesterol, total 188 04/03/2011 04:30 AM    HDL Cholesterol 89 (H) 04/03/2011 04:30 AM    LDL, calculated 87 04/03/2011 04:30 AM    VLDL, calculated 12 04/03/2011 04:30 AM    Triglyceride 60 04/03/2011 04:30 AM    CHOL/HDL Ratio 2.1 04/03/2011 04:30 AM      BNP No results for input(s): BNPP in the last 72 hours.    Liver Enzymes Recent Labs     12/26/21  0415   TP 6.0*   ALB 2.5*   AP 61      Thyroid Studies No results found for: T4, T3U, TSH, TSHEXT     Procedures/imaging: see electronic medical records for all procedures/Xrays and details which were not copied into this note but were reviewed prior to creation of Angelica Nazario MD

## 2021-12-28 VITALS
HEIGHT: 73 IN | DIASTOLIC BLOOD PRESSURE: 91 MMHG | BODY MASS INDEX: 28.28 KG/M2 | TEMPERATURE: 97.9 F | HEART RATE: 81 BPM | RESPIRATION RATE: 16 BRPM | WEIGHT: 213.41 LBS | SYSTOLIC BLOOD PRESSURE: 143 MMHG | OXYGEN SATURATION: 98 %

## 2021-12-28 LAB
GLUCOSE BLD STRIP.AUTO-MCNC: 172 MG/DL (ref 70–110)
GLUCOSE BLD STRIP.AUTO-MCNC: 273 MG/DL (ref 70–110)

## 2021-12-28 PROCEDURE — 74011250636 HC RX REV CODE- 250/636: Performed by: INTERNAL MEDICINE

## 2021-12-28 PROCEDURE — 74011250637 HC RX REV CODE- 250/637: Performed by: HOSPITALIST

## 2021-12-28 PROCEDURE — 94640 AIRWAY INHALATION TREATMENT: CPT

## 2021-12-28 PROCEDURE — 82962 GLUCOSE BLOOD TEST: CPT

## 2021-12-28 PROCEDURE — 74011636637 HC RX REV CODE- 636/637: Performed by: HOSPITALIST

## 2021-12-28 PROCEDURE — 94760 N-INVAS EAR/PLS OXIMETRY 1: CPT

## 2021-12-28 PROCEDURE — 74011000250 HC RX REV CODE- 250: Performed by: HOSPITALIST

## 2021-12-28 RX ORDER — PREDNISONE 20 MG/1
TABLET ORAL
Qty: 15 TABLET | Refills: 0 | OUTPATIENT
Start: 2021-12-28 | End: 2022-02-05

## 2021-12-28 RX ORDER — AMOXICILLIN AND CLAVULANATE POTASSIUM 875; 125 MG/1; MG/1
1 TABLET, FILM COATED ORAL 2 TIMES DAILY WITH MEALS
Qty: 10 TABLET | Refills: 0 | Status: SHIPPED | OUTPATIENT
Start: 2021-12-28 | End: 2022-01-02

## 2021-12-28 RX ORDER — CLONIDINE HYDROCHLORIDE 0.1 MG/1
0.1 TABLET ORAL 2 TIMES DAILY
Qty: 60 TABLET | Refills: 0 | Status: SHIPPED | OUTPATIENT
Start: 2021-12-28

## 2021-12-28 RX ADMIN — IPRATROPIUM BROMIDE AND ALBUTEROL SULFATE 3 ML: .5; 3 SOLUTION RESPIRATORY (INHALATION) at 02:43

## 2021-12-28 RX ADMIN — DABIGATRAN ETEXILATE MESYLATE 150 MG: 75 CAPSULE ORAL at 02:42

## 2021-12-28 RX ADMIN — AMOXICILLIN AND CLAVULANATE POTASSIUM 1 TABLET: 875; 125 TABLET, FILM COATED ORAL at 08:43

## 2021-12-28 RX ADMIN — IPRATROPIUM BROMIDE AND ALBUTEROL SULFATE 3 ML: .5; 3 SOLUTION RESPIRATORY (INHALATION) at 07:12

## 2021-12-28 RX ADMIN — MULTIPLE VITAMINS W/ MINERALS TAB 1 TABLET: TAB at 08:43

## 2021-12-28 RX ADMIN — Medication 6 UNITS: at 08:43

## 2021-12-28 RX ADMIN — METHYLPREDNISOLONE SODIUM SUCCINATE 40 MG: 125 INJECTION, POWDER, FOR SOLUTION INTRAMUSCULAR; INTRAVENOUS at 08:44

## 2021-12-28 RX ADMIN — HYDRALAZINE HYDROCHLORIDE 25 MG: 25 TABLET, FILM COATED ORAL at 08:43

## 2021-12-28 RX ADMIN — BUDESONIDE 250 MCG: 0.25 INHALANT RESPIRATORY (INHALATION) at 07:12

## 2021-12-28 RX ADMIN — INSULIN LISPRO 3 UNITS: 100 INJECTION, SOLUTION INTRAVENOUS; SUBCUTANEOUS at 08:44

## 2021-12-28 RX ADMIN — ARFORMOTEROL TARTRATE 15 MCG: 15 SOLUTION RESPIRATORY (INHALATION) at 07:12

## 2021-12-28 RX ADMIN — Medication 10 UNITS: at 08:44

## 2021-12-28 RX ADMIN — DULOXETINE HYDROCHLORIDE 20 MG: 20 CAPSULE, DELAYED RELEASE ORAL at 08:43

## 2021-12-28 RX ADMIN — ARIPIPRAZOLE 5 MG: 5 TABLET ORAL at 08:43

## 2021-12-28 RX ADMIN — DOXYCYCLINE 100 MG: 100 CAPSULE ORAL at 08:43

## 2021-12-28 RX ADMIN — INSULIN LISPRO 3 UNITS: 100 INJECTION, SOLUTION INTRAVENOUS; SUBCUTANEOUS at 12:44

## 2021-12-28 RX ADMIN — CLONIDINE HYDROCHLORIDE 0.1 MG: 0.1 TABLET ORAL at 08:43

## 2021-12-28 RX ADMIN — IPRATROPIUM BROMIDE AND ALBUTEROL SULFATE 3 ML: .5; 3 SOLUTION RESPIRATORY (INHALATION) at 11:41

## 2021-12-28 RX ADMIN — PANTOPRAZOLE SODIUM 40 MG: 40 TABLET, DELAYED RELEASE ORAL at 08:43

## 2021-12-28 RX ADMIN — Medication 6 UNITS: at 12:43

## 2021-12-28 RX ADMIN — Medication 10 ML: at 06:01

## 2021-12-28 NOTE — ACP (ADVANCE CARE PLANNING)
Advance Care Planning   Advance Care Planning Inpatient Note  [facility] Department    Today's Date: 12/28/2021  Unit: THE [address] CARDIAC/MEDICAL    Received request from brien. Upon review of chart and communication with care team, patient's decision making abilities are not in question. Patient was/were present in the room during visit.     Goals of ACP Conversation:  Discuss Advance Care planning documents    Health Care Decision Makers:      Primary Decision Maker: Bryan Holcomb - [phone]      Summary:  No Decision Maker named by patient at this time    Ann 53 (Patient Wishes) on file:  None   Interventions:  Requested patient/family submit existing document(s) for our records: [address] of /Advance Directive appointment of Health care agent    Care Preferences Communicated:  No    Outcomes/Plan:  ACP Discussion Completed    Chace Jean on 12/28/2021 at 11:07 AM

## 2021-12-28 NOTE — DISCHARGE SUMMARY
[facility] SUMMARY    Name:  Mao Ng  MR#:   [id number]  :  1950  ACCOUNT #:  [de-identified]  ADMIT DATE:  2021  DISCHARGE DATE:  2021      DISCHARGE DIAGNOSES:  1. Acute chronic obstructive pulmonary disease exacerbation. 2.  Chronic obstructive pulmonary disease. 3.  Cocaine use. 4.  Uncontrolled hypertension. 5.  Obstructive sleep apnea, on continuous positive airway pressure therapy. 6.  Hyperglycemia with steroids. 7.  Anxiety and depression. 8.  Thoracic aortic ectasia. 9.  Bronchitis. HOSPITAL COURSE:  Today's exam, the patient is 70years old. He is an elderly [place] American male, resting comfortably in the bed watching television. He is not wearing oxygen. He is 98% saturated with a respiratory rate of 16, blood pressure 143/91, pulse 81, and temperature 97.9. He has few expiratory wheezes on exam, otherwise good air exchange bilaterally. Cardiac exam, regular rate and rhythm. No murmur. Abdomen is soft and nontender with normal bowel sounds. Lower extremities, no pitting edema. Most recent labs, glucose 172. White blood cell count normal.  H and H 11.7 and 37.8. Metabolic panel is within normal limits. LFTs are also normal.  Echocardiogram done on 2021 showed a normal ejection fraction of 60-65%. Chest CT scan angiogram here showed no evidence for pulmonary embolism, there was emphysema, no acute cardiac intrathoracic abnormality. There was ascending thoracic aortic ectasia at 4.3 cm and chest x-ray on 2021 showed interstitial opacities. The patient has been treated here in the hospital for acute COPD exacerbation. There is no evidence for pneumonia, but he has been placed on antibiotics for acute bronchitis. He chronically takes Pradaxa, Cymbalta, and does use cocaine. He has been counseled on that about the dangers of continuing to do that, which he understands.   His urine drug screen was positive for cocaine. Alcohol level was 9. He has been counseled on taking better care of himself considering his advanced age and debility as well as illnesses. With that said, today, he has improved and he feels ready for going home. He denies any chest pain or shortness of breath. He states he still wheezing some when he does walk around the room a little bit, but he feels like he can manage this at home. He has a nebulizer at home that he can use and his girlfriend will be there as well. He is not requiring any oxygen at this point and we have counseled drug and tobacco cessation extensively. Follow up with Dr. Lexie Avila on 01/03/2022. Meds for discharge are the following. DISCHARGE MEDICATIONS:  1. Prednisone 40 mg daily for five days and 20 mg daily for five days thereafter. 2.  Augmentin 875 one p.o. twice daily for five days. 3.  Catapres 0.1 mg twice daily. 4.  Otherwise, resume his albuterol inhaler 2 puffs every 4 hours as needed. 5.  Symbicort 2 puffs twice daily. 6.  Spiriva one cap inhalation daily. 7.  Abilify 5 mg daily. 8.  DuoNeb every 6 hours as needed for wheezing. 9.  Pradaxa 150 mg twice daily. 10.  Cymbalta 20 mg daily. 11.  Prilosec 20 mg daily. 12.  Multivitamin with iron once a day. I had told him to come back to the emergency room if he has any worsening of his shortness of breath or respiratory symptoms. He understands that he feels comfortable with his discharge plan today. The patient had hyperglycemia with his steroids. He is diabetic with an A1c of 6.6%. He will monitor his blood sugars at home and adhere to a diabetic diet per instructions. He is going home today. 40 minutes discharge time.       Kasey Brady MD      [id number]  D:  12/28/2021 12:13  T:  12/28/2021 12:35  JOB #:  [id number]

## 2021-12-28 NOTE — PROGRESS NOTES
Patient in bed with call bell within reach. Patient's bed in locked/lowest position. Biopsy Method: Personna blade

## 2022-01-12 NOTE — PROGRESS NOTES
Physician Progress Note      PATIENT:               Yanet Cuba  CSN #:                  [id number]  :                       1950  ADMIT DATE:       2021 4:24 AM  DISCH DATE:        2021 2:27 PM  RESPONDING  PROVIDER #:        Debra Muhammad MD          QUERY TEXT:    Patient was admitted with a COPD/emphysema exacerbation. Progress notes stated, \"History of Cocaine abuse. He denies recently used cocaine- last use was 6 days ago. But UDS is positive for cocaine. \" After study, can the cause of the COPD exacerbation be further specified as:     The medical record reflects the following:  Risk Factors: cocaine use, copd,  Clinical Indicators: Positive UDS for positive , copd exacerbation , SOB,  medication noncompliance  Treatment: advised  permanent cessation from cocaine , For COPD Duo-neb , solu-Medrol , antibiotic    Thank You  Silvio Romo RN [facility]  [phone]  Options provided:  -- COPD exacerbation likely due to cocaine abuse  -- COPD not related to cocaine  -- Other - I will add my own diagnosis  -- Disagree - Not applicable / Not valid  -- Disagree - Clinically unable to determine / Unknown  -- Refer to Clinical Documentation Reviewer    PROVIDER RESPONSE TEXT:    COPD not related to cocaine    Query created by: Romana Morillo on 2022 9:35 AM      Electronically signed by:  Debra Muhammad MD 2022 6:30 AM

## 2022-01-19 ENCOUNTER — HOSPITAL ENCOUNTER (EMERGENCY)
Age: 72
Discharge: HOME | End: 2022-01-19
Attending: EMERGENCY MEDICINE
Payer: OTHER GOVERNMENT

## 2022-01-19 ENCOUNTER — APPOINTMENT (OUTPATIENT)
Dept: GENERAL RADIOLOGY | Age: 72
End: 2022-01-19
Attending: EMERGENCY MEDICINE
Payer: OTHER GOVERNMENT

## 2022-01-19 VITALS
SYSTOLIC BLOOD PRESSURE: 126 MMHG | RESPIRATION RATE: 18 BRPM | DIASTOLIC BLOOD PRESSURE: 78 MMHG | TEMPERATURE: 98.1 F | OXYGEN SATURATION: 94 % | HEART RATE: 82 BPM | WEIGHT: 215 LBS | BODY MASS INDEX: 28.37 KG/M2

## 2022-01-19 DIAGNOSIS — U07.1 COVID-19: Primary | ICD-10-CM

## 2022-01-19 DIAGNOSIS — J44.1 COPD EXACERBATION (HCC): ICD-10-CM

## 2022-01-19 LAB
ALBUMIN SERPL-MCNC: 2.9 G/DL (ref 3.4–5)
ALBUMIN/GLOB SERPL: 0.7 {RATIO} (ref 0.8–1.7)
ALP SERPL-CCNC: 104 U/L (ref 45–117)
ALT SERPL-CCNC: 26 U/L (ref 16–61)
ANION GAP SERPL CALC-SCNC: 5 MMOL/L (ref 3–18)
APPEARANCE UR: CLEAR
ARTERIAL PATENCY WRIST A: ABNORMAL
AST SERPL-CCNC: 29 U/L (ref 10–38)
ATRIAL RATE: 90 BPM
BACTERIA URNS QL MICRO: ABNORMAL /HPF
BASE EXCESS BLD CALC-SCNC: 1.3 MMOL/L
BASOPHILS # BLD: 0 K/UL (ref 0–0.1)
BASOPHILS NFR BLD: 0 % (ref 0–2)
BDY SITE: ABNORMAL
BILIRUB SERPL-MCNC: 0.8 MG/DL (ref 0.2–1)
BILIRUB UR QL: ABNORMAL
BUN SERPL-MCNC: 11 MG/DL (ref 7–18)
BUN/CREAT SERPL: 10 (ref 12–20)
CALCIUM SERPL-MCNC: 8.7 MG/DL (ref 8.5–10.1)
CALCULATED P AXIS, ECG09: 73 DEGREES
CALCULATED R AXIS, ECG10: 36 DEGREES
CALCULATED T AXIS, ECG11: 69 DEGREES
CHLORIDE SERPL-SCNC: 109 MMOL/L (ref 100–111)
CO2 SERPL-SCNC: 26 MMOL/L (ref 21–32)
COLOR UR: ABNORMAL
COVID-19 RAPID TEST, COVR: DETECTED
CREAT SERPL-MCNC: 1.11 MG/DL (ref 0.6–1.3)
DIAGNOSIS, 93000: NORMAL
DIFFERENTIAL METHOD BLD: ABNORMAL
EOSINOPHIL # BLD: 0.2 K/UL (ref 0–0.4)
EOSINOPHIL NFR BLD: 4 % (ref 0–5)
EPITH CASTS URNS QL MICRO: ABNORMAL /LPF (ref 0–5)
ERYTHROCYTE [DISTWIDTH] IN BLOOD BY AUTOMATED COUNT: 15.8 % (ref 11.6–14.5)
ETHANOL SERPL-MCNC: 4 MG/DL (ref 0–3)
GAS FLOW.O2 O2 DELIVERY SYS: ABNORMAL L/MIN
GLOBULIN SER CALC-MCNC: 4.2 G/DL (ref 2–4)
GLUCOSE SERPL-MCNC: 101 MG/DL (ref 74–99)
GLUCOSE UR STRIP.AUTO-MCNC: NEGATIVE MG/DL
GRAN CASTS URNS QL MICRO: ABNORMAL /LPF
HCO3 BLD-SCNC: 25.7 MMOL/L (ref 22–26)
HCT VFR BLD AUTO: 46 % (ref 36–48)
HGB BLD-MCNC: 14.5 G/DL (ref 13–16)
HGB UR QL STRIP: NEGATIVE
IMM GRANULOCYTES # BLD AUTO: 0 K/UL (ref 0–0.04)
IMM GRANULOCYTES NFR BLD AUTO: 0 % (ref 0–0.5)
KETONES UR QL STRIP.AUTO: ABNORMAL MG/DL
LEUKOCYTE ESTERASE UR QL STRIP.AUTO: ABNORMAL
LYMPHOCYTES # BLD: 1.2 K/UL (ref 0.9–3.6)
LYMPHOCYTES NFR BLD: 30 % (ref 21–52)
MCH RBC QN AUTO: 29.1 PG (ref 24–34)
MCHC RBC AUTO-ENTMCNC: 31.5 G/DL (ref 31–37)
MCV RBC AUTO: 92.4 FL (ref 78–100)
MONOCYTES # BLD: 0.4 K/UL (ref 0.05–1.2)
MONOCYTES NFR BLD: 11 % (ref 3–10)
MUCOUS THREADS URNS QL MICRO: ABNORMAL /LPF
NEUTS SEG # BLD: 2.2 K/UL (ref 1.8–8)
NEUTS SEG NFR BLD: 55 % (ref 40–73)
NITRITE UR QL STRIP.AUTO: NEGATIVE
NRBC # BLD: 0 K/UL (ref 0–0.01)
NRBC BLD-RTO: 0 PER 100 WBC
O2/TOTAL GAS SETTING VFR VENT: 21 %
P-R INTERVAL, ECG05: 144 MS
PCO2 BLD: 38.9 MMHG (ref 35–45)
PH BLD: 7.43 [PH] (ref 7.35–7.45)
PH UR STRIP: 5 [PH] (ref 5–8)
PLATELET # BLD AUTO: 169 K/UL (ref 135–420)
PLATELET COMMENTS,PCOM: ABNORMAL
PMV BLD AUTO: 10.1 FL (ref 9.2–11.8)
PO2 BLD: 64 MMHG (ref 80–100)
POTASSIUM SERPL-SCNC: 4.4 MMOL/L (ref 3.5–5.5)
PROT SERPL-MCNC: 7.1 G/DL (ref 6.4–8.2)
PROT UR STRIP-MCNC: 30 MG/DL
Q-T INTERVAL, ECG07: 356 MS
QRS DURATION, ECG06: 84 MS
QTC CALCULATION (BEZET), ECG08: 435 MS
RBC # BLD AUTO: 4.98 M/UL (ref 4.35–5.65)
RBC #/AREA URNS HPF: NEGATIVE /HPF (ref 0–5)
RBC MORPH BLD: ABNORMAL
SAO2 % BLD: 92.7 % (ref 92–97)
SERVICE CMNT-IMP: ABNORMAL
SODIUM SERPL-SCNC: 140 MMOL/L (ref 136–145)
SOURCE, COVRS: ABNORMAL
SP GR UR REFRACTOMETRY: 1.02 (ref 1–1.03)
SPECIMEN TYPE: ABNORMAL
TOTAL RESP. RATE, ITRR: 20
UROBILINOGEN UR QL STRIP.AUTO: 1 EU/DL (ref 0.2–1)
VENTRICULAR RATE, ECG03: 90 BPM
WBC # BLD AUTO: 4 K/UL (ref 4.6–13.2)
WBC URNS QL MICRO: ABNORMAL /HPF (ref 0–5)

## 2022-01-19 PROCEDURE — 93005 ELECTROCARDIOGRAM TRACING: CPT

## 2022-01-19 PROCEDURE — 36600 WITHDRAWAL OF ARTERIAL BLOOD: CPT

## 2022-01-19 PROCEDURE — 96374 THER/PROPH/DIAG INJ IV PUSH: CPT

## 2022-01-19 PROCEDURE — 85025 COMPLETE CBC W/AUTO DIFF WBC: CPT

## 2022-01-19 PROCEDURE — 87635 SARS-COV-2 COVID-19 AMP PRB: CPT

## 2022-01-19 PROCEDURE — 99284 EMERGENCY DEPT VISIT MOD MDM: CPT

## 2022-01-19 PROCEDURE — 80053 COMPREHEN METABOLIC PANEL: CPT

## 2022-01-19 PROCEDURE — 71045 X-RAY EXAM CHEST 1 VIEW: CPT

## 2022-01-19 PROCEDURE — 81001 URINALYSIS AUTO W/SCOPE: CPT

## 2022-01-19 PROCEDURE — 82077 ASSAY SPEC XCP UR&BREATH IA: CPT

## 2022-01-19 PROCEDURE — 82803 BLOOD GASES ANY COMBINATION: CPT

## 2022-01-19 PROCEDURE — 74011250636 HC RX REV CODE- 250/636: Performed by: EMERGENCY MEDICINE

## 2022-01-19 RX ORDER — ALBUTEROL SULFATE 90 UG/1
2 AEROSOL, METERED RESPIRATORY (INHALATION)
Status: DISCONTINUED | OUTPATIENT
Start: 2022-01-19 | End: 2022-01-19 | Stop reason: HOSPADM

## 2022-01-19 RX ORDER — AMOXICILLIN 500 MG/1
1000 TABLET, FILM COATED ORAL 3 TIMES DAILY
Qty: 60 TABLET | Refills: 0 | Status: SHIPPED | OUTPATIENT
Start: 2022-01-19 | End: 2022-01-29

## 2022-01-19 RX ORDER — PREDNISONE 20 MG/1
60 TABLET ORAL DAILY
Qty: 15 TABLET | Refills: 0 | Status: SHIPPED | OUTPATIENT
Start: 2022-01-19 | End: 2022-01-24

## 2022-01-19 RX ORDER — AZITHROMYCIN 250 MG/1
TABLET, FILM COATED ORAL
Qty: 6 TABLET | Refills: 0 | Status: SHIPPED | OUTPATIENT
Start: 2022-01-19 | End: 2022-01-24

## 2022-01-19 RX ADMIN — METHYLPREDNISOLONE SODIUM SUCCINATE 125 MG: 125 INJECTION, POWDER, FOR SOLUTION INTRAMUSCULAR; INTRAVENOUS at 01:46

## 2022-01-19 NOTE — ED PROVIDER NOTES
79-year-old male with history of alcohol abuse, anxiety, asthma, COPD, hypertension presents emergency department with complaint of generalized fatigue, body aches, cough. Patient lives at a senior residence facility. He arrived to the emergency department no acute distress speaking in full sentences. Pulse of 82, respirations of 18 oxygen saturation 94% on room air. Past Medical History:   Diagnosis Date    Alcohol abuse     Anxiety     Asthma     Bronchitis     Chronic deep vein thrombosis (DVT) (HCC) 07/2013 07/2013, 12/2014 ([facility])    Cocaine abuse ([facility] [address]     COPD     Depression     GERD (gastroesophageal reflux disease)     Hyperlipidemia     Hypertension     Major depression     Mild diastolic dysfunction     NEIL on CPAP     Renal insufficiency     Suicidal ideation     Vitamin D deficiency        Past Surgical History:   Procedure Laterality Date    HX HIP REPLACEMENT  08/2010    HX OTHER SURGICAL  06/2018    ENDOVASCULAR ANEURYSM REPAIR         No family history on file. Social History     Socioeconomic History    Marital status: SINGLE     Spouse name: Not on file    Number of children: Not on file    Years of education: Not on file    Highest education level: Not on file   Occupational History    Not on file   Tobacco Use    Smoking status: Former Smoker    Smokeless tobacco: Never Used    Tobacco comment: States that he quit 12 years ago.    Substance and Sexual Activity    Alcohol use: Not Currently    Drug use: Yes     Types: Cocaine     Comment: 3-4 days ago    Sexual activity: Not on file   Other Topics Concern    Not on file   Social History Narrative    Not on file     Social Determinants of Health     Financial Resource Strain:     Difficulty of Paying Living Expenses: Not on file   Food Insecurity:     Worried About Running Out of Food in the Last Year: Not on file    Bryant of Food in the Last Year: Not on file   Transportation Needs:     Lack of Transportation (Medical): Not on file    Lack of Transportation (Non-Medical): Not on file   Physical Activity:     Days of Exercise per Week: Not on file    Minutes of Exercise per Session: Not on file   Stress:     Feeling of Stress : Not on file   Social Connections:     Frequency of Communication with Friends and Family: Not on file    Frequency of Social Gatherings with Friends and Family: Not on file    Attends [employer] Services: Not on file    Active Member of [address] [place] [address] or Organizations: Not on file    Attends Club or Organization Meetings: Not on file    Marital Status: Not on file   Intimate Partner Violence:     Fear of Current or Ex-Partner: Not on file    Emotionally Abused: Not on file    Physically Abused: Not on file    Sexually Abused: Not on file   Housing Stability:     Unable to Pay for Housing in the Last Year: Not on file    Number of [place] in the Last Year: Not on file    Unstable Housing in the Last Year: Not on file         ALLERGIES: Shellfish derived and Grass pollen    Review of Systems   Constitutional: Positive for activity change, appetite change, chills, diaphoresis and fatigue. Negative for fever and unexpected weight change. HENT: Positive for congestion, postnasal drip and rhinorrhea. Negative for dental problem, drooling, ear discharge, hearing loss, mouth sores, nosebleeds, sinus pressure, sinus pain, sneezing, sore throat, tinnitus and trouble swallowing. Eyes: Negative. Respiratory: Positive for cough and shortness of breath. Negative for apnea, choking, chest tightness, wheezing and stridor. Cardiovascular: Negative for chest pain, palpitations and leg swelling. Gastrointestinal: Negative. Genitourinary: Negative. Musculoskeletal: Positive for myalgias. Negative for back pain and joint swelling. Skin: Negative. Neurological: Negative. Hematological: Negative. Psychiatric/Behavioral: Negative.         Vitals:    01/19/22 0055 BP: 126/78   Pulse: 82   Resp: 18   Temp: 98.1 °F (36.7 °C)   SpO2: 94%   Weight: 97.5 kg (215 lb)            Physical Exam  Vitals and nursing note reviewed. Constitutional:       General: He is not in acute distress. Appearance: He is obese. He is ill-appearing. He is not toxic-appearing or diaphoretic. HENT:      Head: Normocephalic and atraumatic. Mouth/Throat:      Pharynx: Oropharynx is clear. No pharyngeal swelling or oropharyngeal exudate. Eyes:      Extraocular Movements: Extraocular movements intact. Pupils: Pupils are equal, round, and reactive to light. Neck:      Thyroid: No thyromegaly. Vascular: JVD present. No hepatojugular reflux. Trachea: No tracheal deviation. Cardiovascular:      Rate and Rhythm: Normal rate and regular rhythm. Extrasystoles are present. Pulses: No decreased pulses. Heart sounds: Murmur heard. No friction rub. No gallop. Pulmonary:      Effort: Pulmonary effort is normal. No tachypnea, bradypnea, accessory muscle usage or respiratory distress. Breath sounds: Examination of the right-middle field reveals wheezing. Examination of the left-middle field reveals wheezing. Examination of the right-lower field reveals wheezing. Examination of the left-lower field reveals wheezing. Wheezing present. No decreased breath sounds or rales. Chest:      Chest wall: No mass, deformity or tenderness. Musculoskeletal:         General: Normal range of motion. Right lower leg: No edema. Left lower leg: No edema. Lymphadenopathy:      Cervical: No cervical adenopathy. Skin:     General: Skin is warm and dry. Capillary Refill: Capillary refill takes less than 2 seconds. Coloration: Skin is not cyanotic or pale. Findings: No ecchymosis or erythema. Neurological:      General: No focal deficit present. Mental Status: He is alert and oriented to person, place, and time.    Psychiatric:         Mood and Affect: Mood normal.          MDM  Number of Diagnoses or Management Options  COPD exacerbation ([facility] [address]  COVID-19  Diagnosis management comments: 42-year-old male with history of asthma, COPD, hypertension, alcohol abuse, cocaine abuse presents to the emergency department with generalized malaise, fatigue, and cough. On arrival he was in no acute distress speaking in full sentences. Oxygen saturation was 94% on room air respirations were 18. He was afebrile. He is vaccinated for COVID however he states he spends a lot of time recreating in the senior recreation room at his facility. On exam heart sounds are unremarkable he did have bilateral basilar wheezing. His airway was patent and he was not hypoxic. IV was started labs were sent. Patient was given steroids and albuterol in the emergency department. Chest x-ray shows scattered infiltrates without consolidation. Blood gas was drawn. COVID-19 testing returned positive. Wheezing had moderately improved with steroids and albuterol. Chest x-ray showed no consolidation however due to patient's severe COPD status he will be covered with amoxicillin and azithromycin. Patient is more than 5 days out and thus not a monoclonal antibody candidate and does not meet criteria for admission blood gas shows a PO2 of 64% but this is likely chronic secondary to COPD. Discharged home with continued steroids albuterol cough medications amoxicillin and azithromycin. This note is dictated utilizing Dragon voice recognition software. Unfortunately this leads to occasional typographical errors using the voice recognition. I apologize in advance if the situation occurs. If questions occur please do not hesitate to contact me directly.     Patient was seen  and treated during the context of the COVID-19 pandemic.  Contemporary protocols utilized based on the best available evidence, utilizing evolving public health  guidelines and treatment protocols. Procedures

## 2022-01-20 ENCOUNTER — PATIENT OUTREACH (OUTPATIENT)
Dept: CASE MANAGEMENT | Age: 72
End: 2022-01-20

## 2022-01-20 NOTE — PROGRESS NOTES
Date/Time:  1/20/2022 9:47 AM   Call within 2 business days of discharge: Yes   Attempted to reach patient by telephone. Left HIPPA compliant message requesting a return call. Will attempt to reach patient again.

## 2022-01-21 ENCOUNTER — PATIENT OUTREACH (OUTPATIENT)
Dept: CASE MANAGEMENT | Age: 72
End: 2022-01-21

## 2022-01-21 NOTE — PROGRESS NOTES
Date/Time:  1/21/2022 9:35 AM   Call within 2 business days of discharge: Yes   Attempted to reach patient by telephone. Left HIPPA compliant message requesting a return call. Unable to reach, will close episode.

## 2022-01-30 ENCOUNTER — APPOINTMENT (OUTPATIENT)
Dept: CT IMAGING | Age: 72
End: 2022-01-30
Attending: EMERGENCY MEDICINE
Payer: OTHER GOVERNMENT

## 2022-01-30 ENCOUNTER — APPOINTMENT (OUTPATIENT)
Dept: GENERAL RADIOLOGY | Age: 72
End: 2022-01-30
Attending: EMERGENCY MEDICINE
Payer: OTHER GOVERNMENT

## 2022-01-30 ENCOUNTER — HOSPITAL ENCOUNTER (EMERGENCY)
Age: 72
Discharge: HOME | End: 2022-01-30
Attending: EMERGENCY MEDICINE
Payer: OTHER GOVERNMENT

## 2022-01-30 VITALS
WEIGHT: 215 LBS | OXYGEN SATURATION: 97 % | HEART RATE: 87 BPM | TEMPERATURE: 98.5 F | DIASTOLIC BLOOD PRESSURE: 72 MMHG | HEIGHT: 73 IN | BODY MASS INDEX: 28.49 KG/M2 | RESPIRATION RATE: 28 BRPM | SYSTOLIC BLOOD PRESSURE: 108 MMHG

## 2022-01-30 DIAGNOSIS — U07.1 PNEUMONIA DUE TO COVID-19 VIRUS: Primary | ICD-10-CM

## 2022-01-30 DIAGNOSIS — J44.1 COPD EXACERBATION (HCC): ICD-10-CM

## 2022-01-30 DIAGNOSIS — J12.82 PNEUMONIA DUE TO COVID-19 VIRUS: Primary | ICD-10-CM

## 2022-01-30 LAB
ALBUMIN SERPL-MCNC: 2.4 G/DL (ref 3.4–5)
ALBUMIN/GLOB SERPL: 0.6 {RATIO} (ref 0.8–1.7)
ALP SERPL-CCNC: 73 U/L (ref 45–117)
ALT SERPL-CCNC: 13 U/L (ref 16–61)
ANION GAP SERPL CALC-SCNC: 7 MMOL/L (ref 3–18)
ARTERIAL PATENCY WRIST A: POSITIVE
AST SERPL-CCNC: 13 U/L (ref 10–38)
ATRIAL RATE: 92 BPM
BASE DEFICIT BLD-SCNC: 2.5 MMOL/L
BASOPHILS # BLD: 0 K/UL (ref 0–0.1)
BASOPHILS NFR BLD: 0 % (ref 0–2)
BDY SITE: ABNORMAL
BILIRUB SERPL-MCNC: 1.2 MG/DL (ref 0.2–1)
BNP SERPL-MCNC: 34 PG/ML (ref 0–900)
BODY TEMPERATURE: 98.5
BUN SERPL-MCNC: 7 MG/DL (ref 7–18)
BUN/CREAT SERPL: 7 (ref 12–20)
CALCIUM SERPL-MCNC: 8.3 MG/DL (ref 8.5–10.1)
CALCULATED P AXIS, ECG09: 78 DEGREES
CALCULATED R AXIS, ECG10: 37 DEGREES
CALCULATED T AXIS, ECG11: 80 DEGREES
CHLORIDE SERPL-SCNC: 108 MMOL/L (ref 100–111)
CO2 SERPL-SCNC: 25 MMOL/L (ref 21–32)
CREAT SERPL-MCNC: 1.01 MG/DL (ref 0.6–1.3)
DIAGNOSIS, 93000: NORMAL
DIFFERENTIAL METHOD BLD: ABNORMAL
EOSINOPHIL # BLD: 0.4 K/UL (ref 0–0.4)
EOSINOPHIL NFR BLD: 6 % (ref 0–5)
ERYTHROCYTE [DISTWIDTH] IN BLOOD BY AUTOMATED COUNT: 15.1 % (ref 11.6–14.5)
GAS FLOW.O2 O2 DELIVERY SYS: ABNORMAL L/MIN
GLOBULIN SER CALC-MCNC: 4.1 G/DL (ref 2–4)
GLUCOSE SERPL-MCNC: 105 MG/DL (ref 74–99)
HCO3 BLD-SCNC: 22.1 MMOL/L (ref 22–26)
HCT VFR BLD AUTO: 37.4 % (ref 36–48)
HGB BLD-MCNC: 11.6 G/DL (ref 13–16)
IMM GRANULOCYTES # BLD AUTO: 0 K/UL (ref 0–0.04)
IMM GRANULOCYTES NFR BLD AUTO: 0 % (ref 0–0.5)
INR PPP: 1.1 (ref 0.8–1.2)
LYMPHOCYTES # BLD: 1.9 K/UL (ref 0.9–3.6)
LYMPHOCYTES NFR BLD: 27 % (ref 21–52)
MCH RBC QN AUTO: 28.6 PG (ref 24–34)
MCHC RBC AUTO-ENTMCNC: 31 G/DL (ref 31–37)
MCV RBC AUTO: 92.3 FL (ref 78–100)
MONOCYTES # BLD: 0.7 K/UL (ref 0.05–1.2)
MONOCYTES NFR BLD: 10 % (ref 3–10)
NEUTS SEG # BLD: 4 K/UL (ref 1.8–8)
NEUTS SEG NFR BLD: 57 % (ref 40–73)
NRBC # BLD: 0 K/UL (ref 0–0.01)
NRBC BLD-RTO: 0 PER 100 WBC
O2/TOTAL GAS SETTING VFR VENT: 21 %
P-R INTERVAL, ECG05: 144 MS
PCO2 BLD: 36.7 MMHG (ref 35–45)
PH BLD: 7.39 [PH] (ref 7.35–7.45)
PLATELET # BLD AUTO: 159 K/UL (ref 135–420)
PMV BLD AUTO: 9.5 FL (ref 9.2–11.8)
PO2 BLD: 61 MMHG (ref 80–100)
POTASSIUM SERPL-SCNC: 4 MMOL/L (ref 3.5–5.5)
PROT SERPL-MCNC: 6.5 G/DL (ref 6.4–8.2)
PROTHROMBIN TIME: 13.8 SEC (ref 11.5–15.2)
Q-T INTERVAL, ECG07: 352 MS
QRS DURATION, ECG06: 80 MS
QTC CALCULATION (BEZET), ECG08: 435 MS
RBC # BLD AUTO: 4.05 M/UL (ref 4.35–5.65)
SAO2 % BLD: 90.8 % (ref 92–97)
SERVICE CMNT-IMP: ABNORMAL
SODIUM SERPL-SCNC: 140 MMOL/L (ref 136–145)
SPECIMEN TYPE: ABNORMAL
TROPONIN-HIGH SENSITIVITY: 4 NG/L (ref 0–78)
VENTRICULAR RATE, ECG03: 92 BPM
WBC # BLD AUTO: 7 K/UL (ref 4.6–13.2)

## 2022-01-30 PROCEDURE — 93005 ELECTROCARDIOGRAM TRACING: CPT

## 2022-01-30 PROCEDURE — 71045 X-RAY EXAM CHEST 1 VIEW: CPT

## 2022-01-30 PROCEDURE — 84484 ASSAY OF TROPONIN QUANT: CPT

## 2022-01-30 PROCEDURE — 74011000250 HC RX REV CODE- 250: Performed by: EMERGENCY MEDICINE

## 2022-01-30 PROCEDURE — 99285 EMERGENCY DEPT VISIT HI MDM: CPT

## 2022-01-30 PROCEDURE — 83880 ASSAY OF NATRIURETIC PEPTIDE: CPT

## 2022-01-30 PROCEDURE — 74011000636 HC RX REV CODE- 636: Performed by: EMERGENCY MEDICINE

## 2022-01-30 PROCEDURE — 82803 BLOOD GASES ANY COMBINATION: CPT

## 2022-01-30 PROCEDURE — 71275 CT ANGIOGRAPHY CHEST: CPT

## 2022-01-30 PROCEDURE — 85610 PROTHROMBIN TIME: CPT

## 2022-01-30 PROCEDURE — 85025 COMPLETE CBC W/AUTO DIFF WBC: CPT

## 2022-01-30 PROCEDURE — 36600 WITHDRAWAL OF ARTERIAL BLOOD: CPT

## 2022-01-30 PROCEDURE — 80053 COMPREHEN METABOLIC PANEL: CPT

## 2022-01-30 RX ORDER — GUAIFENESIN 600 MG/1
600 TABLET, EXTENDED RELEASE ORAL 2 TIMES DAILY
Qty: 20 TABLET | Refills: 0 | Status: SHIPPED | OUTPATIENT
Start: 2022-01-30

## 2022-01-30 RX ORDER — PREDNISONE 50 MG/1
50 TABLET ORAL DAILY
Qty: 5 TABLET | Refills: 0 | Status: SHIPPED | OUTPATIENT
Start: 2022-01-30 | End: 2022-02-04

## 2022-01-30 RX ORDER — IPRATROPIUM BROMIDE AND ALBUTEROL SULFATE 2.5; .5 MG/3ML; MG/3ML
3 SOLUTION RESPIRATORY (INHALATION) ONCE
Status: COMPLETED | OUTPATIENT
Start: 2022-01-30 | End: 2022-01-30

## 2022-01-30 RX ORDER — MAGNESIUM SULFATE HEPTAHYDRATE 40 MG/ML
2 INJECTION, SOLUTION INTRAVENOUS
Status: DISCONTINUED | OUTPATIENT
Start: 2022-01-30 | End: 2022-01-30

## 2022-01-30 RX ORDER — ALBUTEROL SULFATE 0.83 MG/ML
10 SOLUTION RESPIRATORY (INHALATION)
Status: COMPLETED | OUTPATIENT
Start: 2022-01-30 | End: 2022-01-30

## 2022-01-30 RX ORDER — ALBUTEROL SULFATE 0.83 MG/ML
5 SOLUTION RESPIRATORY (INHALATION)
Status: COMPLETED | OUTPATIENT
Start: 2022-01-30 | End: 2022-01-30

## 2022-01-30 RX ADMIN — ALBUTEROL SULFATE 10 MG: 2.5 SOLUTION RESPIRATORY (INHALATION) at 21:40

## 2022-01-30 RX ADMIN — ALBUTEROL SULFATE 5 MG: 2.5 SOLUTION RESPIRATORY (INHALATION) at 20:28

## 2022-01-30 RX ADMIN — IOPAMIDOL 80 ML: 755 INJECTION, SOLUTION INTRAVENOUS at 20:37

## 2022-01-30 RX ADMIN — IPRATROPIUM BROMIDE AND ALBUTEROL SULFATE 3 ML: .5; 2.5 SOLUTION RESPIRATORY (INHALATION) at 20:28

## 2022-01-31 ENCOUNTER — PATIENT OUTREACH (OUTPATIENT)
Dept: CASE MANAGEMENT | Age: 72
End: 2022-01-31

## 2022-01-31 NOTE — ED PROVIDER NOTES
EMERGENCY DEPARTMENT HISTORY AND PHYSICAL EXAM    Date: 1/30/2022  Patient Name: Татьяна Avalos    History of Presenting Illness     Chief Complaint   Patient presents with    Shortness of Breath         History Provided By: Patient  Additional History (Context): Татьяна Avalos is a 70 y.o. male with hypertension, COPD, asthma and crack cocaine use who presents with complains of shortness of breath. States his shortness of breath is with exertion he is wheezing but has an inhaler at home. Last use crack cocaine last week when he smoked it. Stop smoking tobacco few years ago. Denies fever nausea vomiting diarrhea. He was positive for Covid this month on the 19th. Says it hurts when he is taking a deep breath then. PCP: Sammie Alvarenga MD    Current Facility-Administered Medications   Medication Dose Route Frequency Provider Last Rate Last Admin    albuterol (PROVENTIL VENTOLIN) nebulizer solution 10 mg  10 mg Nebulization NOW Kimmie Arteaga PA         Current Outpatient Medications   Medication Sig Dispense Refill    predniSONE (DELTASONE) 50 mg tablet Take 1 Tablet by mouth daily for 5 days. 5 Tablet 0    guaiFENesin ER (Mucinex) 600 mg ER tablet Take 1 Tablet by mouth two (2) times a day. 20 Tablet 0    cloNIDine HCL (CATAPRES) 0.1 mg tablet Take 1 Tablet by mouth two (2) times a day. 60 Tablet 0    predniSONE (DELTASONE) 20 mg tablet take 40 mg daily X 5 days, then 20 mg daily X 5 days and stop 15 Tablet 0    DULoxetine (CYMBALTA) 20 mg capsule 20 mg.      therapeutic multivitamin-minerals (THERAGRAN-M) tablet Take 1 Tab by Mouth Once a Day.  ARIPiprazole (ABILIFY) 5 mg tablet Take 1 Tab by mouth daily. Indications: DEPRESSION TREATMENT ADJUNCT 30 Tab 1    dabigatran etexilate (PRADAXA) 150 mg capsule Take 1 Cap by mouth every twelve (12) hours.  Indications: PREVENT THROMBOEMBOLISM IN CHRONIC ATRIAL FIBRILLATION, PREVENTION OF DEEP VEIN THROMBOSIS RECURRENCE 60 Cap 1    albuterol (PROVENTIL HFA, VENTOLIN HFA, PROAIR HFA) 90 mcg/actuation inhaler Take 2 Puffs by inhalation every four (4) hours as needed for Wheezing or Shortness of Breath (Cough). 1 Inhaler 0    SPIRIVA WITH HANDIHALER 18 mcg inhalation capsule Take 1 Cap by inhalation daily. 2/1/17 Dr. Marleni Sandoval omeprazole (PRILOSEC) 20 mg capsule Take 1 Cap by mouth two (2) times a day. 2/1/17, QTY#60, 30 Days. 2/1/17 Dr. Marleni Sandoval budesonide-formoterol [facility]) 160-4.5 mcg/actuation HFA inhaler Take 2 Puffs by inhalation two (2) times a day.  albuterol-ipratropium (DUONEB) 2.5 mg-0.5 mg/3 ml nebu 3 mL by Nebulization route every six (6) hours as needed. Past History     Past Medical History:  Past Medical History:   Diagnosis Date    Alcohol abuse     Anxiety     Asthma     Bronchitis     [facility] deep [facility] thrombosis [facility]DVT) ([facility]) 07/2013 07/2013, 12/2014 ([facility])    Cocaine abuse ([facility] [address]     COPD     Depression     GERD (gastroesophageal reflux disease)     Hyperlipidemia     Hypertension     Major depression     Mild diastolic dysfunction     NEIL on CPAP     Renal insufficiency     Suicidal ideation     Vitamin D deficiency        Past Surgical History:  Past Surgical History:   Procedure Laterality Date    HX HIP REPLACEMENT  08/2010    HX OTHER SURGICAL  06/2018    ENDOVASCULAR ANEURYSM REPAIR       Family History:  History reviewed. No pertinent family history. Social History:  Social History     Tobacco Use    Smoking status: Former Smoker    Smokeless tobacco: Never Used    Tobacco comment: States that he quit 12 years ago. Substance Use Topics    Alcohol use: Not Currently    Drug use: Yes     Types: Cocaine     Comment: 3-4 days ago       Allergies: Allergies   Allergen Reactions    Shellfish Derived Anaphylaxis    Grass Pollen Shortness of Breath         Review of Systems   Review of Systems   Constitutional: Negative for fever. HENT: Negative. Eyes: Negative. Respiratory: Positive for cough, shortness of breath and wheezing. Cardiovascular: Positive for chest pain. Gastrointestinal: Negative for abdominal pain, diarrhea, nausea and vomiting. Endocrine: Negative. Genitourinary: Negative. Musculoskeletal: Negative. Skin: Negative. Allergic/Immunologic: Negative. Neurological: Negative. Hematological: Does not bruise/bleed easily. Psychiatric/Behavioral: Negative. All Other Systems Negative  Physical Exam     Vitals:    01/30/22 1940 01/30/22 2000 01/30/22 2030 01/30/22 2100   BP:  115/72     Pulse:  90 89 87   Resp:  24 20 29   Temp:       SpO2: 94% 97% 97% 96%   Weight:       Height:         Physical Exam  Vitals and nursing note reviewed. Constitutional:       General: He is not in acute distress. Appearance: He is well-developed. He is not ill-appearing, toxic-appearing or diaphoretic. HENT:      Head: Normocephalic and atraumatic. Neck:      Thyroid: No thyromegaly. Vascular: No carotid bruit. Trachea: No tracheal deviation. Cardiovascular:      Rate and Rhythm: Normal rate and regular rhythm. Heart sounds: Normal heart sounds. No murmur heard. No friction rub. No gallop. Pulmonary:      Effort: Pulmonary effort is normal. No respiratory distress. Breath sounds: No stridor. Examination of the right-upper field reveals wheezing. Examination of the left-upper field reveals wheezing. Examination of the right-middle field reveals wheezing. Examination of the left-middle field reveals wheezing. Examination of the right-lower field reveals wheezing. Examination of the left-lower field reveals wheezing. Wheezing present. No rales. Chest:      Chest wall: No tenderness. Abdominal:      General: There is no distension. Palpations: Abdomen is soft. There is no mass. Tenderness: There is no abdominal tenderness. There is no guarding or rebound.    Musculoskeletal:         General: Normal range of motion. Cervical back: Normal range of motion and neck supple. Skin:     General: Skin is warm and dry. Coloration: Skin is not pale. Neurological:      Mental Status: He is alert and oriented to person, place, and time. Psychiatric:         Speech: Speech normal.         Behavior: Behavior normal.         Thought Content: Thought content normal.         Judgment: Judgment normal.            Diagnostic Study Results     Labs -     Recent Results (from the past 12 hour(s))   EKG, 12 LEAD, INITIAL    Collection Time: 01/30/22  7:14 PM   Result Value Ref Range    Ventricular Rate 92 BPM    Atrial Rate 92 BPM    P-R Interval 144 ms    QRS Duration 80 ms    Q-T Interval 352 ms    QTC Calculation (Bezet) 435 ms    Calculated P Axis 78 degrees    Calculated R Axis 37 degrees    Calculated T Axis 80 degrees    Diagnosis       Poor data quality, interpretation may be adversely affected  Normal sinus rhythm  Nonspecific ST abnormality  Abnormal ECG  Confirmed by Stefan Diana MD, [facility] ([id number]) on 1/30/2022 8:26:26 PM     CBC WITH AUTOMATED DIFF    Collection Time: 01/30/22  7:26 PM   Result Value Ref Range    WBC 7.0 4.6 - 13.2 K/uL    RBC 4.05 (L) 4.35 - 5.65 M/uL    HGB 11.6 (L) 13.0 - 16.0 g/dL    HCT 37.4 36.0 - 48.0 %    MCV 92.3 78.0 - 100.0 FL    MCH 28.6 24.0 - 34.0 PG    MCHC 31.0 31.0 - 37.0 g/dL    RDW 15.1 (H) 11.6 - 14.5 %    PLATELET 079 338 - 088 K/uL    MPV 9.5 9.2 - 11.8 FL    NRBC 0.0 0  WBC    ABSOLUTE NRBC 0.00 0.00 - 0.01 K/uL    NEUTROPHILS 57 40 - 73 %    LYMPHOCYTES 27 21 - 52 %    MONOCYTES 10 3 - 10 %    EOSINOPHILS 6 (H) 0 - 5 %    BASOPHILS 0 0 - 2 %    IMMATURE GRANULOCYTES 0 0.0 - 0.5 %    ABS. NEUTROPHILS 4.0 1.8 - 8.0 K/UL    ABS. LYMPHOCYTES 1.9 0.9 - 3.6 K/UL    ABS. MONOCYTES 0.7 0.05 - 1.2 K/UL    ABS. EOSINOPHILS 0.4 0.0 - 0.4 K/UL    ABS. BASOPHILS 0.0 0.0 - 0.1 K/UL    ABS. IMM.  GRANS. 0.0 0.00 - 0.04 K/UL    DF AUTOMATED     METABOLIC PANEL, COMPREHENSIVE    Collection Time: 01/30/22  7:26 PM   Result Value Ref Range    Sodium 140 136 - 145 mmol/L    Potassium 4.0 3.5 - 5.5 mmol/L    Chloride 108 100 - 111 mmol/L    CO2 25 21 - 32 mmol/L    Anion gap 7 3.0 - 18 mmol/L    Glucose 105 (H) 74 - 99 mg/dL    BUN 7 7.0 - 18 MG/DL    Creatinine 1.01 0.6 - 1.3 MG/DL    BUN/Creatinine ratio 7 (L) 12 - 20      GFR est AA >60 >60 ml/min/1.73m2    GFR est non-AA >60 >60 ml/min/1.73m2    Calcium 8.3 (L) 8.5 - 10.1 MG/DL    Bilirubin, total 1.2 (H) 0.2 - 1.0 MG/DL    ALT (SGPT) 13 (L) 16 - 61 U/L    AST (SGOT) 13 10 - 38 U/L    Alk. phosphatase 73 45 - 117 U/L    Protein, total 6.5 6.4 - 8.2 g/dL    Albumin 2.4 (L) 3.4 - 5.0 g/dL    Globulin 4.1 (H) 2.0 - 4.0 g/dL    A-G Ratio 0.6 (L) 0.8 - 1.7     NT-PRO BNP    Collection Time: 01/30/22  7:26 PM   Result Value Ref Range    NT pro-BNP 34 0 - 900 PG/ML   TROPONIN-HIGH SENSITIVITY    Collection Time: 01/30/22  7:26 PM   Result Value Ref Range    Troponin-High Sensitivity 4 0 - 78 ng/L   PROTHROMBIN TIME + INR    Collection Time: 01/30/22  7:26 PM   Result Value Ref Range    Prothrombin time 13.8 11.5 - 15.2 sec    INR 1.1 0.8 - 1.2     BLOOD GAS, ARTERIAL POC    Collection Time: 01/30/22  8:02 PM   Result Value Ref Range    Device: ROOM AIR      FIO2 (POC) 21 %    pH (POC) 7.39 7.35 - 7.45      pCO2 (POC) 36.7 35.0 - 45.0 MMHG    pO2 (POC) 61 (L) 80 - 100 MMHG    HCO3 (POC) 22.1 22 - 26 MMOL/L    sO2 (POC) 90.8 (L) 92 - 97 %    Base deficit (POC) 2.5 mmol/L    Allens test (POC) Positive      Site RIGHT RADIAL      Patient temp. 98.5      Specimen type (POC) ARTERIAL      Performed by Elmo Dopp        Radiologic Studies -   CTA CHEST W OR W WO CONT   Final Result      1. No evidence of pulmonary embolization. Please note, the lower third of the   lungs could not be optimally assessed due to respiratory motion. 2.  Emphysema.  Increasing mild to moderate degree of peripheral predominantly   interstitial infiltrates in keeping with the given history of COVID-19   pneumonia. 3. Bronchial wall thickening without change, most commonly chronic bronchitis. Acute bronchitis not excluded. XR CHEST PORT   Final Result      Hyperinflation with mild bilateral interstitial infiltrates, with slight   improvement in lung bases, otherwise unchanged. CT Results  (Last 48 hours)               01/30/22 2038  CTA CHEST W OR W WO CONT Final result    Impression:      1. No evidence of pulmonary embolization. Please note, the lower third of the   lungs could not be optimally assessed due to respiratory motion. 2.  Emphysema. Increasing mild to moderate degree of peripheral predominantly   interstitial infiltrates in keeping with the given history of COVID-19   pneumonia. 3. Bronchial wall thickening without change, most commonly chronic bronchitis. Acute bronchitis not excluded. Narrative:  EXAM: CTA chest       INDICATION: Difficulty breathing. COVID positive. COMPARISON: 12/23/2021       TECHNIQUE: Helical volumetric scanning was performed from the thoracic inlet   through the diaphragm during pulmonary arterial phase of nonionic IV contrast   enhancement. Axial reconstructions, and slab MIP coronal and sagittal reformats   were generated. One or more dose reduction techniques were used on this CT:   automated exposure control, adjustment of the mAs and/or kVp according to   patient size, and iterative reconstruction techniques. The specific techniques   used on this CT exam have been documented in the patient's electronic medical   record. Digital Imaging and Communications in Medicine (DICOM) format image   data are available to nonaffiliated external healthcare facilities or entities   on a secure, media free, reciprocally searchable basis with patient   authorization for at least a 12-month period after this study.        _______________       FINDINGS:       EXAM QUALITY: Overall exam quality is optimal through the upper two thirds of   the chest, suboptimal through the lower third, pulmonary arterial enhancement is   optimal, breath hold is suboptimal to the lower third of the chest, other   factors affecting quality are absent. PULMONARY ARTERIES: No pulmonary arterial filling defects identified. Please   note, the lower third of the lungs cannot be optimally assessed. MEDIASTINUM: Normal heart size. Dilatation of the ascending aorta measures 4.3   cm without change. No dissection. No pericardial effusion. LUNGS: Lungs are hyperinflated with emphysema. Increasing peripheral groundglass   and interstitial consolidations are present in the bilateral upper lobes,   anterior basal left lower lobe, posterior basal right lower lobe, lateral basal   left lower lobe, overall burden mild to moderate. PLEURA/CHEST WALL: Normal.       AIRWAY: Central bronchial wall thickening is present as previously seen. LYMPH NODES: No enlarged nodes. UPPER ABDOMEN: Unremarkable. OTHER: No acute or aggressive osseous abnormalities identified. _______________               CXR Results  (Last 48 hours)               01/30/22 1938  XR CHEST PORT Final result    Impression:      Hyperinflation with mild bilateral interstitial infiltrates, with slight   improvement in lung bases, otherwise unchanged. Narrative:  EXAM: CHEST RADIOGRAPH, SINGLE VIEW       CLINICAL INDICATION/HISTORY: sob        <Additional:  Patient states positive for COVID this month on the 19th. COMPARISON: 1/19/2022       TECHNIQUE: Portable frontal view of the chest was obtained.        _______________       FINDINGS:       SUPPORT DEVICES: None. HEART AND MEDIASTINUM: Aorta is slightly tortuous. Cardiac silhouette is within   normal range in size. LUNGS AND PLEURAL SPACES: Lungs are hyperinflated.  Pulmonary vessels are normal.   Mild reticular interstitial markings are present bilaterally, right greater than   left, with improvement at the lung bases, otherwise unchanged. BONY THORAX AND SOFT TISSUES: No acute osseous abnormality. _______________                   Medical Decision Making   I am the first provider for this patient. I reviewed the vital signs, available nursing notes, past medical history, past surgical history, family history and social history. Vital Signs-Reviewed the patient's vital signs. Records Reviewed: Nursing Notes    Procedures:  EKG    Date/Time: 1/30/2022 7:14 PM  Performed by: SHERRIE Sprague Aas  Authorized by: SHERRIE Sprague Aas     Interpretation:     Interpretation: normal    Rate:     ECG rate:  92    ECG rate assessment: normal    Rhythm:     Rhythm: sinus rhythm    Ectopy:     Ectopy: none    QRS:     QRS axis:  Normal    QRS intervals:  Normal  Conduction:     Conduction: normal    ST segments:     ST segments:  Normal  T waves:     T waves: normal          Provider Notes (Medical Decision Making): Jose includes PE Covid pneumonia bronchitis COPD exacerbation asthma exacerbation hypoxia NSTEMI    Patient has no PE. He has groundglass multifocal pneumonia consistent with Covid. He is not hypoxic. He has nebs and steroids and inhalers at home. Given magnesium and Solu-Medrol in route. Will send home with prednisone Mucinex and have him return for any worsening. Advised to slow down his use of cocaine. Give another neb treatment prior to departure. 9:29 PM  I have spent 35 minutes of critical care time involved in lab review, consultations with specialist, family decision-making, and documentation. During this entire length of time I was immediately available to the patient. Critical Care: The reason for providing this level of medical care for this critically ill patient was due a critical illness that impaired one or more vital organ systems such that there was a high probability of imminent or life threatening deterioration in the patients condition. This care involved high complexity decision making to assess, manipulate, and support vital system functions, to treat this degreee vital organ system failure and to prevent further life threatening deterioration of the patients condition. MED RECONCILIATION:  Current Facility-Administered Medications   Medication Dose Route Frequency    albuterol (PROVENTIL VENTOLIN) nebulizer solution 10 mg  10 mg Nebulization NOW     Current Outpatient Medications   Medication Sig    predniSONE (DELTASONE) 50 mg tablet Take 1 Tablet by mouth daily for 5 days.  guaiFENesin ER (Mucinex) 600 mg ER tablet Take 1 Tablet by mouth two (2) times a day.  cloNIDine HCL (CATAPRES) 0.1 mg tablet Take 1 Tablet by mouth two (2) times a day.  predniSONE (DELTASONE) 20 mg tablet take 40 mg daily X 5 days, then 20 mg daily X 5 days and stop    DULoxetine (CYMBALTA) 20 mg capsule 20 mg.    therapeutic multivitamin-minerals (THERAGRAN-M) tablet Take 1 Tab by Mouth Once a Day.  ARIPiprazole (ABILIFY) 5 mg tablet Take 1 Tab by mouth daily. Indications: DEPRESSION TREATMENT ADJUNCT    dabigatran etexilate (PRADAXA) 150 mg capsule Take 1 Cap by mouth every twelve (12) hours. Indications: PREVENT THROMBOEMBOLISM IN CHRONIC ATRIAL FIBRILLATION, PREVENTION OF DEEP VEIN THROMBOSIS RECURRENCE    albuterol (PROVENTIL HFA, VENTOLIN HFA, PROAIR HFA) 90 mcg/actuation inhaler Take 2 Puffs by inhalation every four (4) hours as needed for Wheezing or Shortness of Breath (Cough).  SPIRIVA WITH HANDIHALER 18 mcg inhalation capsule Take 1 Cap by inhalation daily. 2/1/17 Dr. Lory Ferris omeprazole (PRILOSEC) 20 mg capsule Take 1 Cap by mouth two (2) times a day. 2/1/17, QTY#60, 30 Days. 2/1/17 Dr. Lory Ferris budesonide-formoterol [facility]) 160-4.5 mcg/actuation HFA inhaler Take 2 Puffs by inhalation two (2) times a day.  albuterol-ipratropium (DUONEB) 2.5 mg-0.5 mg/3 ml nebu 3 mL by Nebulization route every six (6) hours as needed. Disposition:  home    DISCHARGE NOTE:   9:28 PM    Pt has been reexamined. Patient has no new complaints, changes, or physical findings. Care plan outlined and precautions discussed. Results of labs, CXR, CTA were reviewed with the patient. All medications were reviewed with the patient; will d/c home with prednisone, mucinex. All of pt's questions and concerns were addressed. Patient was instructed and agrees to follow up with PCP, as well as to return to the ED upon further deterioration. Patient is ready to go home. Follow-up Information     Follow up With Specialties Details Why Cody Castaneda MD [facility] Medicine Schedule an appointment as soon as possible for a visit in 1 day  [address]      THE [facility] EMERGENCY DEPT Emergency Medicine  If symptoms worsen return immediately [address] [place] [id number]  [phone]          Current Discharge Medication List      START taking these medications    Details   ! ! predniSONE (DELTASONE) 50 mg tablet Take 1 Tablet by mouth daily for 5 days. Qty: 5 Tablet, Refills: 0  Start date: 1/30/2022, End date: 2/4/2022      guaiFENesin ER (Mucinex) 600 mg ER tablet Take 1 Tablet by mouth two (2) times a day. Qty: 20 Tablet, Refills: 0  Start date: 1/30/2022       !! - Potential duplicate medications found. Please discuss with provider. CONTINUE these medications which have NOT CHANGED    Details   ! ! predniSONE (DELTASONE) 20 mg tablet take 40 mg daily X 5 days, then 20 mg daily X 5 days and stop  Qty: 15 Tablet, Refills: 0       !! - Potential duplicate medications found. Please discuss with provider. Diagnosis     Clinical Impression:   1. Pneumonia due to COVID-19 virus    2.  COPD exacerbation ([facility] [address]

## 2022-01-31 NOTE — ED NOTES
Pt medicated per [facility] following allergy verification. Continues to have diffuse wheezing to all fields with productive cough. Pt able to speak in full sentences, endorses that this COPD exacerbation feels similar to previous COPD exacerbations.

## 2022-01-31 NOTE — ED NOTES
Pt given discharge instructions. Verbalizes understanding r/t discharge instructions/Rx. Pt able to ambulate well without assistance/speak in full sentences.

## 2022-01-31 NOTE — PROGRESS NOTES
Date/Time:  1/31/2022 9:34 AM   Call within 2 business days of discharge: Yes   Attempted to reach patient by telephone. Left HIPPA compliant message requesting a return call. Will attempt to reach patient again.

## 2022-01-31 NOTE — ED TRIAGE NOTES
Patient arrives from home via ambulance with complaints of COPD exacerbation, 12 breathing treatments today with no relief, 125mg solumedrol, 2.5g of mag given pta

## 2022-02-05 ENCOUNTER — HOSPITAL ENCOUNTER (EMERGENCY)
Age: 72
Discharge: HOME | End: 2022-02-05
Attending: EMERGENCY MEDICINE

## 2022-02-05 VITALS
HEIGHT: 73 IN | OXYGEN SATURATION: 98 % | BODY MASS INDEX: 25.13 KG/M2 | DIASTOLIC BLOOD PRESSURE: 83 MMHG | SYSTOLIC BLOOD PRESSURE: 118 MMHG | WEIGHT: 189.6 LBS | HEART RATE: 81 BPM | TEMPERATURE: 98 F | RESPIRATION RATE: 22 BRPM

## 2022-02-05 DIAGNOSIS — J44.1 CHRONIC OBSTRUCTIVE PULMONARY DISEASE WITH ACUTE EXACERBATION (HCC): Primary | ICD-10-CM

## 2022-02-05 LAB
ALBUMIN SERPL-MCNC: 2.3 G/DL (ref 3.4–5)
ALBUMIN/GLOB SERPL: 0.6 {RATIO} (ref 0.8–1.7)
ALP SERPL-CCNC: 61 U/L (ref 45–117)
ALT SERPL-CCNC: 11 U/L (ref 16–61)
ANION GAP SERPL CALC-SCNC: 2 MMOL/L (ref 3–18)
AST SERPL-CCNC: 7 U/L (ref 10–38)
ATRIAL RATE: 84 BPM
BASOPHILS # BLD: 0 K/UL (ref 0–0.1)
BASOPHILS NFR BLD: 0 % (ref 0–2)
BILIRUB SERPL-MCNC: 0.3 MG/DL (ref 0.2–1)
BUN SERPL-MCNC: 6 MG/DL (ref 7–18)
BUN/CREAT SERPL: 6 (ref 12–20)
CALCIUM SERPL-MCNC: 8.3 MG/DL (ref 8.5–10.1)
CALCULATED P AXIS, ECG09: 72 DEGREES
CALCULATED R AXIS, ECG10: 52 DEGREES
CALCULATED T AXIS, ECG11: 74 DEGREES
CHLORIDE SERPL-SCNC: 112 MMOL/L (ref 100–111)
CO2 SERPL-SCNC: 27 MMOL/L (ref 21–32)
CREAT SERPL-MCNC: 0.98 MG/DL (ref 0.6–1.3)
DIAGNOSIS, 93000: NORMAL
DIFFERENTIAL METHOD BLD: ABNORMAL
EOSINOPHIL # BLD: 0.5 K/UL (ref 0–0.4)
EOSINOPHIL NFR BLD: 9 % (ref 0–5)
ERYTHROCYTE [DISTWIDTH] IN BLOOD BY AUTOMATED COUNT: 15.4 % (ref 11.6–14.5)
GLOBULIN SER CALC-MCNC: 4 G/DL (ref 2–4)
GLUCOSE SERPL-MCNC: 156 MG/DL (ref 74–99)
HCT VFR BLD AUTO: 34.8 % (ref 36–48)
HGB BLD-MCNC: 10.7 G/DL (ref 13–16)
IMM GRANULOCYTES # BLD AUTO: 0 K/UL (ref 0–0.04)
IMM GRANULOCYTES NFR BLD AUTO: 0 % (ref 0–0.5)
LYMPHOCYTES # BLD: 2 K/UL (ref 0.9–3.6)
LYMPHOCYTES NFR BLD: 33 % (ref 21–52)
MCH RBC QN AUTO: 28.3 PG (ref 24–34)
MCHC RBC AUTO-ENTMCNC: 30.7 G/DL (ref 31–37)
MCV RBC AUTO: 92.1 FL (ref 78–100)
MONOCYTES # BLD: 0.5 K/UL (ref 0.05–1.2)
MONOCYTES NFR BLD: 9 % (ref 3–10)
NEUTS SEG # BLD: 3 K/UL (ref 1.8–8)
NEUTS SEG NFR BLD: 49 % (ref 40–73)
NRBC # BLD: 0 K/UL (ref 0–0.01)
NRBC BLD-RTO: 0 PER 100 WBC
P-R INTERVAL, ECG05: 148 MS
PLATELET # BLD AUTO: 163 K/UL (ref 135–420)
PMV BLD AUTO: 9.2 FL (ref 9.2–11.8)
POTASSIUM SERPL-SCNC: 3.4 MMOL/L (ref 3.5–5.5)
PROT SERPL-MCNC: 6.3 G/DL (ref 6.4–8.2)
Q-T INTERVAL, ECG07: 392 MS
QRS DURATION, ECG06: 92 MS
QTC CALCULATION (BEZET), ECG08: 463 MS
RBC # BLD AUTO: 3.78 M/UL (ref 4.35–5.65)
SODIUM SERPL-SCNC: 141 MMOL/L (ref 136–145)
VENTRICULAR RATE, ECG03: 84 BPM
WBC # BLD AUTO: 6.1 K/UL (ref 4.6–13.2)

## 2022-02-05 PROCEDURE — 85025 COMPLETE CBC W/AUTO DIFF WBC: CPT

## 2022-02-05 PROCEDURE — 99285 EMERGENCY DEPT VISIT HI MDM: CPT

## 2022-02-05 PROCEDURE — 93005 ELECTROCARDIOGRAM TRACING: CPT

## 2022-02-05 PROCEDURE — 94640 AIRWAY INHALATION TREATMENT: CPT

## 2022-02-05 PROCEDURE — 74011000250 HC RX REV CODE- 250: Performed by: EMERGENCY MEDICINE

## 2022-02-05 PROCEDURE — 80053 COMPREHEN METABOLIC PANEL: CPT

## 2022-02-05 RX ORDER — IPRATROPIUM BROMIDE AND ALBUTEROL SULFATE 2.5; .5 MG/3ML; MG/3ML
3 SOLUTION RESPIRATORY (INHALATION)
Status: COMPLETED | OUTPATIENT
Start: 2022-02-05 | End: 2022-02-05

## 2022-02-05 RX ORDER — PREDNISONE 10 MG/1
60 TABLET ORAL DAILY
Qty: 18 TABLET | Refills: 0 | Status: SHIPPED | OUTPATIENT
Start: 2022-02-05

## 2022-02-05 RX ADMIN — IPRATROPIUM BROMIDE AND ALBUTEROL SULFATE 3 ML: .5; 2.5 SOLUTION RESPIRATORY (INHALATION) at 11:00

## 2022-02-05 RX ADMIN — IPRATROPIUM BROMIDE AND ALBUTEROL SULFATE 3 ML: .5; 2.5 SOLUTION RESPIRATORY (INHALATION) at 12:34

## 2022-02-05 NOTE — ED TRIAGE NOTES
Patient arrived via ems states he is short of breath. States he did not get his prednisone filled because it is 55$ and he did not have the money.   Patient was  Given albuterol and 2 grams mag in route to ed by ems

## 2022-02-05 NOTE — ED PROVIDER NOTES
EMERGENCY DEPARTMENT HISTORY AND PHYSICAL EXAM    Date: 2/5/2022  Patient Name: Rossana Rashdi    History of Presenting Illness     Chief Complaint   Patient presents with    Shortness of Breath    Chest Pain       History Provided By: Patient and EMS     History Rajendra Reyesu):   10:45 AM  Rossana Rashid is a 70 y.o. male with PMHX of COPD who presents to the emergency department by EMS C/O dyspnea onset today. Associated sxs include tachycardia. Pt denies fevers, chills, cough or any other sxs or complaints. Patient was brought in by EMS with complaints of dyspnea. He has been taking his nebulizers through the night per EMS. In route to the ED, EMS gave the patient 125 mg of Solu-Medrol IV, 1 albuterol nebulizer and 2 g of IV magnesium. EMS reports the patient was initially having profound inspiratory and expiratory wheezing and now has only moderate expiratory wheezing. Patient states that he was seen approximately 1 week ago for similar symptoms and has been unable to afford his prednisone as an outpatient. Patient was positive for Covid on 19 January 2022. Chief Complaint: Dyspnea  Onset: Today  Timing: Acute  Context: Not having his prednisone brought symptoms on, symptoms have rapidly worsened since onset  Location: Lungs  Quality: Tightness  Severity: Moderate  Modifying Factors: Nothing makes it better, or worse. Associated Symptoms: Tachycardia    PCP: Jovan Brooks MD     Current Outpatient Medications   Medication Sig Dispense Refill    predniSONE (DELTASONE) 10 mg tablet Take 60 mg by mouth daily. First dose 6 Feb 22 18 Tablet 0    guaiFENesin ER (Mucinex) 600 mg ER tablet Take 1 Tablet by mouth two (2) times a day. 20 Tablet 0    cloNIDine HCL (CATAPRES) 0.1 mg tablet Take 1 Tablet by mouth two (2) times a day. 60 Tablet 0    DULoxetine (CYMBALTA) 20 mg capsule 20 mg.      therapeutic multivitamin-minerals (THERAGRAN-M) tablet Take 1 Tab by Mouth Once a Day.       ARIPiprazole (ABILIFY) 5 mg tablet Take 1 Tab by mouth daily. Indications: DEPRESSION TREATMENT ADJUNCT 30 Tab 1    dabigatran etexilate (PRADAXA) 150 mg capsule Take 1 Cap by mouth every twelve (12) hours. Indications: PREVENT THROMBOEMBOLISM IN CHRONIC ATRIAL FIBRILLATION, PREVENTION OF DEEP VEIN THROMBOSIS RECURRENCE 60 Cap 1    albuterol (PROVENTIL HFA, VENTOLIN HFA, PROAIR HFA) 90 mcg/actuation inhaler Take 2 Puffs by inhalation every four (4) hours as needed for Wheezing or Shortness of Breath (Cough). 1 Inhaler 0    SPIRIVA WITH HANDIHALER 18 mcg inhalation capsule Take 1 Cap by inhalation daily. 2/1/17 Dr. Michelle Verduzco omeprazole (PRILOSEC) 20 mg capsule Take 1 Cap by mouth two (2) times a day. 2/1/17, QTY#60, 30 Days. 2/1/17 Dr. Michelle Verduzco budesonide-formoterol [facility]) 160-4.5 mcg/actuation HFA inhaler Take 2 Puffs by inhalation two (2) times a day.  albuterol-ipratropium (DUONEB) 2.5 mg-0.5 mg/3 ml nebu 3 mL by Nebulization route every six (6) hours as needed. Past History         Past Medical History:  Past Medical History:   Diagnosis Date    Alcohol abuse     Anxiety     Asthma     Bronchitis     Chronic deep vein thrombosis (DVT) (HCC) 07/2013 07/2013, 12/2014 ([facility])    Cocaine abuse ([address]     COPD     Depression     GERD (gastroesophageal reflux disease)     Hyperlipidemia     Hypertension     Major depression     Mild diastolic dysfunction     NEIL on CPAP     Renal insufficiency     Suicidal ideation     Vitamin D deficiency        Past Surgical History:  Past Surgical History:   Procedure Laterality Date    HX HIP REPLACEMENT  08/2010    HX OTHER SURGICAL  06/2018    ENDOVASCULAR ANEURYSM REPAIR       Family History:  History reviewed. No pertinent family history. Reviewed and non-contributory    Social History:  Social History     Tobacco Use    Smoking status: Former Smoker    Smokeless tobacco: Never Used    Tobacco comment: States that he quit 12 years ago. Substance Use Topics    Alcohol use: Not Currently    Drug use: Yes     Types: Cocaine     Comment: no use of cocaine in one week       Allergies: Allergies   Allergen Reactions    Shellfish Derived Anaphylaxis    Grass Pollen Shortness of Breath         Review of Systems      Review of Systems   Constitutional: Negative for chills and fever. HENT: Negative for rhinorrhea and sore throat. Eyes: Negative for pain and visual disturbance. Respiratory: Positive for chest tightness and shortness of breath. Negative for wheezing. Cardiovascular: Negative for chest pain and palpitations. Gastrointestinal: Negative for abdominal pain, diarrhea, nausea and vomiting. Musculoskeletal: Negative for arthralgias and myalgias. Skin: Negative for rash and wound. Neurological: Negative for speech difficulty, light-headedness and headaches. Psychiatric/Behavioral: Negative for agitation and confusion. All other systems reviewed and are negative. Physical Exam     Vitals:    02/05/22 1129 02/05/22 1300 02/05/22 1330 02/05/22 1400   BP: 117/74 122/77 115/78 118/83   Pulse: 81 79 80 81   Resp: 17 18 20 22   Temp:       SpO2: 100% 100% 100% 98%   Weight:       Height:           Physical Exam  Vitals and nursing note reviewed. Constitutional:       General: He is not in acute distress. Appearance: Normal appearance. He is normal weight. He is not ill-appearing. HENT:      Head: Normocephalic and atraumatic. Nose: Nose normal. No rhinorrhea. Mouth/Throat:      Mouth: Mucous membranes are moist.      Pharynx: No oropharyngeal exudate or posterior oropharyngeal erythema. Eyes:      Extraocular Movements: Extraocular movements intact. Conjunctiva/sclera: Conjunctivae normal.      Pupils: Pupils are equal, round, and reactive to light. Cardiovascular:      Rate and Rhythm: Normal rate and regular rhythm. Heart sounds: No murmur heard. No friction rub. No gallop.     Pulmonary: Effort: Pulmonary effort is normal. No respiratory distress. Breath sounds: Decreased air movement present. Examination of the right-middle field reveals wheezing. Examination of the left-middle field reveals wheezing. Examination of the right-lower field reveals wheezing. Examination of the left-lower field reveals wheezing. Wheezing present. No rhonchi or rales. Abdominal:      General: Bowel sounds are normal.      Palpations: Abdomen is soft. Tenderness: There is no abdominal tenderness. There is no guarding or rebound. Musculoskeletal:         General: No swelling, tenderness or deformity. Normal range of motion. Cervical back: Normal range of motion and neck supple. No rigidity. Lymphadenopathy:      Cervical: No cervical adenopathy. Skin:     General: Skin is warm and dry. Findings: No rash. Neurological:      General: No focal deficit present. Mental Status: He is alert and oriented to person, place, and time.    Psychiatric:         Mood and Affect: Mood normal.         Behavior: Behavior normal.         Diagnostic Study Results     Labs -     Recent Results (from the past 12 hour(s))   EKG, 12 LEAD, INITIAL    Collection Time: 02/05/22 10:45 AM   Result Value Ref Range    Ventricular Rate 84 BPM    Atrial Rate 84 BPM    P-R Interval 148 ms    QRS Duration 92 ms    Q-T Interval 392 ms    QTC Calculation (Bezet) 463 ms    Calculated P Axis 72 degrees    Calculated R Axis 52 degrees    Calculated T Axis 74 degrees    Diagnosis       Normal sinus rhythm  Septal infarct , age undetermined  Abnormal ECG  When compared with ECG of 30-JAN-2022 19:14,  Septal infarct is now present     CBC WITH AUTOMATED DIFF    Collection Time: 02/05/22 10:54 AM   Result Value Ref Range    WBC 6.1 4.6 - 13.2 K/uL    RBC 3.78 (L) 4.35 - 5.65 M/uL    HGB 10.7 (L) 13.0 - 16.0 g/dL    HCT 34.8 (L) 36.0 - 48.0 %    MCV 92.1 78.0 - 100.0 FL    MCH 28.3 24.0 - 34.0 PG    MCHC 30.7 (L) 31.0 - 37.0 g/dL    RDW 15.4 (H) 11.6 - 14.5 %    PLATELET 176 605 - 889 K/uL    MPV 9.2 9.2 - 11.8 FL    NRBC 0.0 0  WBC    ABSOLUTE NRBC 0.00 0.00 - 0.01 K/uL    NEUTROPHILS 49 40 - 73 %    LYMPHOCYTES 33 21 - 52 %    MONOCYTES 9 3 - 10 %    EOSINOPHILS 9 (H) 0 - 5 %    BASOPHILS 0 0 - 2 %    IMMATURE GRANULOCYTES 0 0.0 - 0.5 %    ABS. NEUTROPHILS 3.0 1.8 - 8.0 K/UL    ABS. LYMPHOCYTES 2.0 0.9 - 3.6 K/UL    ABS. MONOCYTES 0.5 0.05 - 1.2 K/UL    ABS. EOSINOPHILS 0.5 (H) 0.0 - 0.4 K/UL    ABS. BASOPHILS 0.0 0.0 - 0.1 K/UL    ABS. IMM. GRANS. 0.0 0.00 - 0.04 K/UL    DF AUTOMATED     METABOLIC PANEL, COMPREHENSIVE    Collection Time: 02/05/22 10:54 AM   Result Value Ref Range    Sodium 141 136 - 145 mmol/L    Potassium 3.4 (L) 3.5 - 5.5 mmol/L    Chloride 112 (H) 100 - 111 mmol/L    CO2 27 21 - 32 mmol/L    Anion gap 2 (L) 3.0 - 18 mmol/L    Glucose 156 (H) 74 - 99 mg/dL    BUN 6 (L) 7.0 - 18 MG/DL    Creatinine 0.98 0.6 - 1.3 MG/DL    BUN/Creatinine ratio 6 (L) 12 - 20      GFR est AA >60 >60 ml/min/1.73m2    GFR est non-AA >60 >60 ml/min/1.73m2    Calcium 8.3 (L) 8.5 - 10.1 MG/DL    Bilirubin, total 0.3 0.2 - 1.0 MG/DL    ALT (SGPT) 11 (L) 16 - 61 U/L    AST (SGOT) 7 (L) 10 - 38 U/L    Alk. phosphatase 61 45 - 117 U/L    Protein, total 6.3 (L) 6.4 - 8.2 g/dL    Albumin 2.3 (L) 3.4 - 5.0 g/dL    Globulin 4.0 2.0 - 4.0 g/dL    A-G Ratio 0.6 (L) 0.8 - 1.7         Radiologic Studies -   No orders to display     CT Results  (Last 48 hours)    None        CXR Results  (Last 48 hours)    None          Medications given in the ED-  Medications   albuterol-ipratropium (DUO-NEB) 2.5 MG-0.5 MG/3 ML (3 mL Nebulization Given 2/5/22 1100)   albuterol-ipratropium (DUO-NEB) 2.5 MG-0.5 MG/3 ML (3 mL Nebulization Given 2/5/22 1234)         Procedures     Procedures    ED Course     I am the first provider for this patient.   I reviewed the vital signs, available nursing notes, past medical history, past surgical history, family history and social history. Records Reviewed: Nursing Notes    Pulse Oximetry Analysis - 96% on RA     Cardiac Monitor:  Rate: 84 bpm  Rhythm: sinus rhythm    EKG interpretation: (Preliminary)  Rhythm: NSR. Rate: 84 bpm; no STEMI  EKG read by Luis Bess MD at 10:45 AM    10:45 AM Initial assessment performed. The patients presenting problems have been discussed, and they are in agreement with the care plan formulated and outlined with them. I have encouraged them to ask questions as they arise throughout their visit. ED Course as of 02/05/22 1406   Sat Feb 05, 2022   1220 Mild to moderate wheezing in bilateral lower fields, will give another nebulizer treatment. [JM]   1356 Lungs are clear without wheezing bilaterally. [JM]      ED Course User Index  [JM] Patrick Flores MD         Medical Decision Making     Provider Notes (Medical Decision Making):   DDX: URI, COPD, post Covid    Discussion:  70 y.o. male with COPD exacerbation. Patient cleared after 2 nebs in the ED. He was given Solu-Medrol albuterol and magnesium in the ambulance. Patient states he has not been able to afford his prednisone as an outpatient. He was provided a good Rx card, and a coupon for 18 tablets of 10 mg prednisone for $3.91 from the [facility] pharmacy which is his preferred pharmacy. Patient follow-up with his primary care doctor. Patient understands and agrees with plan. Diagnosis and Disposition     DISCHARGE NOTE:  2:06 PM   Michelle Magaña's  results have been reviewed with him. He has been counseled regarding his diagnosis, treatment, and plan. He verbally conveys understanding and agreement of the signs, symptoms, diagnosis, treatment and prognosis and additionally agrees to follow up as discussed. He also agrees with the care-plan and conveys that all of his questions have been answered.   I have also provided discharge instructions for him that include: educational information regarding their diagnosis and treatment, and list of reasons why they would want to return to the ED prior to their follow-up appointment, should his condition change. He has been provided with education for proper emergency department utilization. CLINICAL IMPRESSION:    1. Chronic obstructive pulmonary disease with acute exacerbation (HCC)        PLAN:  1. D/C Home  2. Current Discharge Medication List      START taking these medications    Details   predniSONE (DELTASONE) 10 mg tablet Take 60 mg by mouth daily. First dose 6 Feb 22  Qty: 18 Tablet, Refills: 0  Start date: 2/5/2022           3. Follow-up Information     Follow up With Specialties Details Why Elvira Smith MD Family Medicine  As soon as possible, For follow up from Emergency Department visit. [address] [id number] [phone]      [facility] EMERGENCY DEPT Emergency Medicine  As needed; If symptoms worsen [address] [place] [id number]  [address]     Please note that this dictation was completed with Jam[employer], the computer voice recognition software. Quite often unanticipated grammatical, syntax, homophones, and other interpretive errors are inadvertently transcribed by the computer software. Please disregard these errors. Please excuse any errors that have escaped final proofreading.     Marci Dejesus MD

## 2022-02-08 ENCOUNTER — PATIENT OUTREACH (OUTPATIENT)
Dept: CASE MANAGEMENT | Age: 72
End: 2022-02-08

## 2022-02-08 NOTE — PROGRESS NOTES
Date/Time:  2/8/2022 10:34 AM   Call within 2 business days of discharge: Yes   Attempted to reach patient by telephone. Left HIPPA compliant message requesting a return call. Patient resolved from Transition of Care episode on  2/8/2022           No further outreach scheduled with this CTN/ACM. Episode of Care resolved. Patient has this CTN/ACM contact information if future needs arise.

## 2022-03-18 PROBLEM — J96.01 ACUTE RESPIRATORY FAILURE WITH HYPOXIA (HCC): Status: ACTIVE | Noted: 2021-11-23

## 2022-03-19 PROBLEM — Z91.199 NONCOMPLIANCE WITH CPAP TREATMENT: Status: ACTIVE | Noted: 2021-12-07

## 2022-03-19 PROBLEM — E11.65 HYPERGLYCEMIA DUE TO TYPE 2 DIABETES MELLITUS (HCC): Status: ACTIVE | Noted: 2021-12-11

## 2022-03-19 PROBLEM — I77.810 AORTIC ECTASIA, THORACIC (HCC): Status: ACTIVE | Noted: 2021-12-23

## 2022-03-19 PROBLEM — I71.40 AAA (ABDOMINAL AORTIC ANEURYSM) (HCC): Status: ACTIVE | Noted: 2018-06-08

## 2022-03-19 PROBLEM — J18.1 CONSOLIDATION OF RIGHT LOWER LOBE OF LUNG (HCC): Status: ACTIVE | Noted: 2021-12-07

## 2022-03-19 PROBLEM — J18.9 PNEUMONIA: Status: ACTIVE | Noted: 2021-11-23

## 2022-03-19 PROBLEM — E55.9 VITAMIN D DEFICIENCY DISEASE: Status: ACTIVE | Noted: 2017-02-09

## 2022-03-20 PROBLEM — J44.1 COPD WITH ACUTE EXACERBATION (HCC): Status: ACTIVE | Noted: 2021-12-07

## 2023-11-03 ENCOUNTER — HOSPITAL ENCOUNTER (OUTPATIENT)
Facility: HOSPITAL | Age: 73
Setting detail: RECURRING SERIES
Discharge: HOME | End: 2023-11-06
Payer: OTHER GOVERNMENT

## 2023-11-03 PROCEDURE — 99205 OFFICE O/P NEW HI 60 MIN: CPT | Performed by: RADIOLOGY

## 2023-11-03 PROCEDURE — 99214 OFFICE O/P EST MOD 30 MIN: CPT

## 2024-01-26 ENCOUNTER — TRANSCRIBE ORDERS (OUTPATIENT)
Facility: HOSPITAL | Age: 74
End: 2024-01-26

## 2024-01-26 DIAGNOSIS — C61 MALIGNANT NEOPLASM OF PROSTATE (HCC): Primary | ICD-10-CM

## 2024-03-12 ENCOUNTER — HOSPITAL ENCOUNTER (OUTPATIENT)
Facility: HOSPITAL | Age: 74
Discharge: HOME | End: 2024-03-15
Attending: RADIOLOGY
Payer: OTHER GOVERNMENT

## 2024-03-12 ENCOUNTER — HOSPITAL ENCOUNTER (OUTPATIENT)
Facility: HOSPITAL | Age: 74
Setting detail: RECURRING SERIES
Discharge: HOME | End: 2024-03-15
Attending: RADIOLOGY
Payer: OTHER GOVERNMENT

## 2024-03-12 DIAGNOSIS — C61 MALIGNANT NEOPLASM OF PROSTATE (HCC): ICD-10-CM

## 2024-03-12 DIAGNOSIS — C61 MALIGNANT NEOPLASM OF PROSTATE (HCC): Primary | ICD-10-CM

## 2024-03-12 PROCEDURE — 76498 UNLISTED MR PROCEDURE: CPT

## 2024-03-12 PROCEDURE — 77334 RADIATION TREATMENT AID(S): CPT

## 2024-03-12 RX ORDER — DIATRIZOATE MEGLUMINE AND DIATRIZOATE SODIUM 660; 100 MG/ML; MG/ML
30 LIQUID ORAL; RECTAL
Qty: 30 ML | Refills: 0
Start: 2024-03-12

## 2024-03-18 ENCOUNTER — HOSPITAL ENCOUNTER (OUTPATIENT)
Facility: HOSPITAL | Age: 74
Setting detail: RECURRING SERIES
Discharge: HOME | End: 2024-03-21
Attending: RADIOLOGY
Payer: OTHER GOVERNMENT

## 2024-03-18 PROCEDURE — 77301 RADIOTHERAPY DOSE PLAN IMRT: CPT

## 2024-03-18 PROCEDURE — 77338 DESIGN MLC DEVICE FOR IMRT: CPT

## 2024-03-18 PROCEDURE — 77300 RADIATION THERAPY DOSE PLAN: CPT

## 2024-03-19 ENCOUNTER — HOSPITAL ENCOUNTER (OUTPATIENT)
Facility: HOSPITAL | Age: 74
Setting detail: RECURRING SERIES
Discharge: HOME | End: 2024-03-22
Attending: RADIOLOGY
Payer: OTHER GOVERNMENT

## 2024-03-19 LAB
RAD ONC ARIA COURSE FIRST TREATMENT DATE: NORMAL
RAD ONC ARIA COURSE ID: NORMAL
RAD ONC ARIA COURSE INTENT: NORMAL
RAD ONC ARIA COURSE LAST TREATMENT DATE: NORMAL
RAD ONC ARIA COURSE SESSION NUMBER: 1
RAD ONC ARIA COURSE START DATE: NORMAL
RAD ONC ARIA COURSE TREATMENT ELAPSED DAYS: 0
RAD ONC ARIA PLAN FRACTIONS TREATED TO DATE: 1
RAD ONC ARIA PLAN ID: NORMAL
RAD ONC ARIA PLAN PRESCRIBED DOSE PER FRACTION: 2.5 GY
RAD ONC ARIA PLAN PRIMARY REFERENCE POINT: NORMAL
RAD ONC ARIA PLAN TOTAL FRACTIONS PRESCRIBED: 28
RAD ONC ARIA PLAN TOTAL PRESCRIBED DOSE: 7000 CGY
RAD ONC ARIA REFERENCE POINT DOSAGE GIVEN TO DATE: 2.5 GY
RAD ONC ARIA REFERENCE POINT ID: NORMAL
RAD ONC ARIA REFERENCE POINT SESSION DOSAGE GIVEN: 2.5 GY

## 2024-03-19 PROCEDURE — 77427 RADIATION TX MANAGEMENT X5: CPT | Performed by: RADIOLOGY

## 2024-03-19 PROCEDURE — 77385 HC NTSTY MODUL RAD TX DLVR SMPL: CPT

## 2024-03-20 ENCOUNTER — HOSPITAL ENCOUNTER (OUTPATIENT)
Facility: HOSPITAL | Age: 74
Setting detail: RECURRING SERIES
Discharge: HOME | End: 2024-03-23
Attending: RADIOLOGY
Payer: OTHER GOVERNMENT

## 2024-03-20 LAB
RAD ONC ARIA COURSE FIRST TREATMENT DATE: NORMAL
RAD ONC ARIA COURSE ID: NORMAL
RAD ONC ARIA COURSE INTENT: NORMAL
RAD ONC ARIA COURSE LAST TREATMENT DATE: NORMAL
RAD ONC ARIA COURSE SESSION NUMBER: 2
RAD ONC ARIA COURSE START DATE: NORMAL
RAD ONC ARIA COURSE TREATMENT ELAPSED DAYS: 1
RAD ONC ARIA PLAN FRACTIONS TREATED TO DATE: 2
RAD ONC ARIA PLAN ID: NORMAL
RAD ONC ARIA PLAN PRESCRIBED DOSE PER FRACTION: 2.5 GY
RAD ONC ARIA PLAN PRIMARY REFERENCE POINT: NORMAL
RAD ONC ARIA PLAN TOTAL FRACTIONS PRESCRIBED: 28
RAD ONC ARIA PLAN TOTAL PRESCRIBED DOSE: 7000 CGY
RAD ONC ARIA REFERENCE POINT DOSAGE GIVEN TO DATE: 5 GY
RAD ONC ARIA REFERENCE POINT ID: NORMAL
RAD ONC ARIA REFERENCE POINT SESSION DOSAGE GIVEN: 2.5 GY

## 2024-03-20 PROCEDURE — 77385 HC NTSTY MODUL RAD TX DLVR SMPL: CPT

## 2024-03-20 PROCEDURE — 77014 CHG CT GUIDANCE RADIATION THERAPY FLDS PLACEMENT: CPT | Performed by: RADIOLOGY

## 2024-03-21 ENCOUNTER — HOSPITAL ENCOUNTER (OUTPATIENT)
Facility: HOSPITAL | Age: 74
Setting detail: RECURRING SERIES
Discharge: HOME | End: 2024-03-24
Attending: RADIOLOGY
Payer: OTHER GOVERNMENT

## 2024-03-21 LAB
RAD ONC ARIA COURSE FIRST TREATMENT DATE: NORMAL
RAD ONC ARIA COURSE ID: NORMAL
RAD ONC ARIA COURSE INTENT: NORMAL
RAD ONC ARIA COURSE LAST TREATMENT DATE: NORMAL
RAD ONC ARIA COURSE SESSION NUMBER: 3
RAD ONC ARIA COURSE START DATE: NORMAL
RAD ONC ARIA COURSE TREATMENT ELAPSED DAYS: 2
RAD ONC ARIA PLAN FRACTIONS TREATED TO DATE: 3
RAD ONC ARIA PLAN ID: NORMAL
RAD ONC ARIA PLAN PRESCRIBED DOSE PER FRACTION: 2.5 GY
RAD ONC ARIA PLAN PRIMARY REFERENCE POINT: NORMAL
RAD ONC ARIA PLAN TOTAL FRACTIONS PRESCRIBED: 28
RAD ONC ARIA PLAN TOTAL PRESCRIBED DOSE: 7000 CGY
RAD ONC ARIA REFERENCE POINT DOSAGE GIVEN TO DATE: 7.5 GY
RAD ONC ARIA REFERENCE POINT ID: NORMAL
RAD ONC ARIA REFERENCE POINT SESSION DOSAGE GIVEN: 2.5 GY

## 2024-03-21 PROCEDURE — 77014 CHG CT GUIDANCE RADIATION THERAPY FLDS PLACEMENT: CPT | Performed by: RADIOLOGY

## 2024-03-21 PROCEDURE — 77385 HC NTSTY MODUL RAD TX DLVR SMPL: CPT

## 2024-03-22 ENCOUNTER — HOSPITAL ENCOUNTER (OUTPATIENT)
Facility: HOSPITAL | Age: 74
Setting detail: RECURRING SERIES
Discharge: HOME | End: 2024-03-25
Attending: RADIOLOGY
Payer: OTHER GOVERNMENT

## 2024-03-22 LAB
RAD ONC ARIA COURSE FIRST TREATMENT DATE: NORMAL
RAD ONC ARIA COURSE ID: NORMAL
RAD ONC ARIA COURSE INTENT: NORMAL
RAD ONC ARIA COURSE LAST TREATMENT DATE: NORMAL
RAD ONC ARIA COURSE SESSION NUMBER: 4
RAD ONC ARIA COURSE START DATE: NORMAL
RAD ONC ARIA COURSE TREATMENT ELAPSED DAYS: 3
RAD ONC ARIA PLAN FRACTIONS TREATED TO DATE: 4
RAD ONC ARIA PLAN ID: NORMAL
RAD ONC ARIA PLAN PRESCRIBED DOSE PER FRACTION: 2.5 GY
RAD ONC ARIA PLAN PRIMARY REFERENCE POINT: NORMAL
RAD ONC ARIA PLAN TOTAL FRACTIONS PRESCRIBED: 28
RAD ONC ARIA PLAN TOTAL PRESCRIBED DOSE: 7000 CGY
RAD ONC ARIA REFERENCE POINT DOSAGE GIVEN TO DATE: 10 GY
RAD ONC ARIA REFERENCE POINT ID: NORMAL
RAD ONC ARIA REFERENCE POINT SESSION DOSAGE GIVEN: 2.5 GY

## 2024-03-22 PROCEDURE — 77385 HC NTSTY MODUL RAD TX DLVR SMPL: CPT

## 2024-03-22 PROCEDURE — 77014 CHG CT GUIDANCE RADIATION THERAPY FLDS PLACEMENT: CPT | Performed by: RADIOLOGY

## 2024-03-25 ENCOUNTER — HOSPITAL ENCOUNTER (OUTPATIENT)
Facility: HOSPITAL | Age: 74
Setting detail: RECURRING SERIES
Discharge: HOME | End: 2024-03-28
Attending: RADIOLOGY
Payer: OTHER GOVERNMENT

## 2024-03-26 ENCOUNTER — APPOINTMENT (OUTPATIENT)
Facility: HOSPITAL | Age: 74
End: 2024-03-26
Attending: RADIOLOGY
Payer: OTHER GOVERNMENT

## 2024-03-26 ENCOUNTER — HOSPITAL ENCOUNTER (OUTPATIENT)
Facility: HOSPITAL | Age: 74
Setting detail: RECURRING SERIES
End: 2024-03-26
Attending: RADIOLOGY
Payer: OTHER GOVERNMENT

## 2024-03-26 LAB
RAD ONC ARIA COURSE FIRST TREATMENT DATE: NORMAL
RAD ONC ARIA COURSE ID: NORMAL
RAD ONC ARIA COURSE INTENT: NORMAL
RAD ONC ARIA COURSE LAST TREATMENT DATE: NORMAL
RAD ONC ARIA COURSE SESSION NUMBER: 5
RAD ONC ARIA COURSE START DATE: NORMAL
RAD ONC ARIA COURSE TREATMENT ELAPSED DAYS: 7
RAD ONC ARIA PLAN FRACTIONS TREATED TO DATE: 5
RAD ONC ARIA PLAN ID: NORMAL
RAD ONC ARIA PLAN PRESCRIBED DOSE PER FRACTION: 2.5 GY
RAD ONC ARIA PLAN PRIMARY REFERENCE POINT: NORMAL
RAD ONC ARIA PLAN TOTAL FRACTIONS PRESCRIBED: 28
RAD ONC ARIA PLAN TOTAL PRESCRIBED DOSE: 7000 CGY
RAD ONC ARIA REFERENCE POINT DOSAGE GIVEN TO DATE: 12.5 GY
RAD ONC ARIA REFERENCE POINT ID: NORMAL
RAD ONC ARIA REFERENCE POINT SESSION DOSAGE GIVEN: 2.12 GY

## 2024-03-26 PROCEDURE — 77385 HC NTSTY MODUL RAD TX DLVR SMPL: CPT

## 2024-03-26 PROCEDURE — 77014 CHG CT GUIDANCE RADIATION THERAPY FLDS PLACEMENT: CPT | Performed by: RADIOLOGY

## 2024-03-26 PROCEDURE — 77336 RADIATION PHYSICS CONSULT: CPT

## 2024-03-27 ENCOUNTER — HOSPITAL ENCOUNTER (OUTPATIENT)
Facility: HOSPITAL | Age: 74
Setting detail: RECURRING SERIES
Discharge: HOME | End: 2024-03-30
Attending: RADIOLOGY
Payer: OTHER GOVERNMENT

## 2024-03-27 LAB
RAD ONC ARIA COURSE FIRST TREATMENT DATE: NORMAL
RAD ONC ARIA COURSE ID: NORMAL
RAD ONC ARIA COURSE INTENT: NORMAL
RAD ONC ARIA COURSE LAST TREATMENT DATE: NORMAL
RAD ONC ARIA COURSE SESSION NUMBER: 6
RAD ONC ARIA COURSE START DATE: NORMAL
RAD ONC ARIA COURSE TREATMENT ELAPSED DAYS: 8
RAD ONC ARIA PLAN FRACTIONS TREATED TO DATE: 6
RAD ONC ARIA PLAN ID: NORMAL
RAD ONC ARIA PLAN PRESCRIBED DOSE PER FRACTION: 2.5 GY
RAD ONC ARIA PLAN PRIMARY REFERENCE POINT: NORMAL
RAD ONC ARIA PLAN TOTAL FRACTIONS PRESCRIBED: 28
RAD ONC ARIA PLAN TOTAL PRESCRIBED DOSE: 7000 CGY
RAD ONC ARIA REFERENCE POINT DOSAGE GIVEN TO DATE: 15 GY
RAD ONC ARIA REFERENCE POINT ID: NORMAL
RAD ONC ARIA REFERENCE POINT SESSION DOSAGE GIVEN: 2.5 GY

## 2024-03-27 PROCEDURE — 77014 CHG CT GUIDANCE RADIATION THERAPY FLDS PLACEMENT: CPT | Performed by: RADIOLOGY

## 2024-03-27 PROCEDURE — 77385 HC NTSTY MODUL RAD TX DLVR SMPL: CPT

## 2024-03-28 ENCOUNTER — HOSPITAL ENCOUNTER (OUTPATIENT)
Facility: HOSPITAL | Age: 74
Setting detail: RECURRING SERIES
Discharge: HOME | End: 2024-03-31
Attending: RADIOLOGY
Payer: OTHER GOVERNMENT

## 2024-03-28 LAB
RAD ONC ARIA COURSE FIRST TREATMENT DATE: NORMAL
RAD ONC ARIA COURSE ID: NORMAL
RAD ONC ARIA COURSE INTENT: NORMAL
RAD ONC ARIA COURSE LAST TREATMENT DATE: NORMAL
RAD ONC ARIA COURSE SESSION NUMBER: 7
RAD ONC ARIA COURSE START DATE: NORMAL
RAD ONC ARIA COURSE TREATMENT ELAPSED DAYS: 9
RAD ONC ARIA PLAN FRACTIONS TREATED TO DATE: 7
RAD ONC ARIA PLAN ID: NORMAL
RAD ONC ARIA PLAN PRESCRIBED DOSE PER FRACTION: 2.5 GY
RAD ONC ARIA PLAN PRIMARY REFERENCE POINT: NORMAL
RAD ONC ARIA PLAN TOTAL FRACTIONS PRESCRIBED: 28
RAD ONC ARIA PLAN TOTAL PRESCRIBED DOSE: 7000 CGY
RAD ONC ARIA REFERENCE POINT DOSAGE GIVEN TO DATE: 17.5 GY
RAD ONC ARIA REFERENCE POINT ID: NORMAL
RAD ONC ARIA REFERENCE POINT SESSION DOSAGE GIVEN: 2.5 GY

## 2024-03-28 PROCEDURE — 77385 HC NTSTY MODUL RAD TX DLVR SMPL: CPT

## 2024-03-29 ENCOUNTER — HOSPITAL ENCOUNTER (OUTPATIENT)
Facility: HOSPITAL | Age: 74
Setting detail: RECURRING SERIES
Discharge: HOME | End: 2024-04-01
Attending: RADIOLOGY
Payer: OTHER GOVERNMENT

## 2024-03-29 LAB
RAD ONC ARIA COURSE FIRST TREATMENT DATE: NORMAL
RAD ONC ARIA COURSE ID: NORMAL
RAD ONC ARIA COURSE INTENT: NORMAL
RAD ONC ARIA COURSE LAST TREATMENT DATE: NORMAL
RAD ONC ARIA COURSE SESSION NUMBER: 8
RAD ONC ARIA COURSE START DATE: NORMAL
RAD ONC ARIA COURSE TREATMENT ELAPSED DAYS: 10
RAD ONC ARIA PLAN FRACTIONS TREATED TO DATE: 8
RAD ONC ARIA PLAN ID: NORMAL
RAD ONC ARIA PLAN PRESCRIBED DOSE PER FRACTION: 2.5 GY
RAD ONC ARIA PLAN PRIMARY REFERENCE POINT: NORMAL
RAD ONC ARIA PLAN TOTAL FRACTIONS PRESCRIBED: 28
RAD ONC ARIA PLAN TOTAL PRESCRIBED DOSE: 7000 CGY
RAD ONC ARIA REFERENCE POINT DOSAGE GIVEN TO DATE: 20 GY
RAD ONC ARIA REFERENCE POINT ID: NORMAL
RAD ONC ARIA REFERENCE POINT SESSION DOSAGE GIVEN: 2.5 GY

## 2024-03-29 PROCEDURE — 77014 CHG CT GUIDANCE RADIATION THERAPY FLDS PLACEMENT: CPT | Performed by: RADIOLOGY

## 2024-03-29 PROCEDURE — 77385 HC NTSTY MODUL RAD TX DLVR SMPL: CPT

## 2024-04-01 ENCOUNTER — HOSPITAL ENCOUNTER (OUTPATIENT)
Facility: HOSPITAL | Age: 74
Setting detail: RECURRING SERIES
Discharge: HOME | End: 2024-04-04
Attending: RADIOLOGY
Payer: OTHER GOVERNMENT

## 2024-04-01 LAB
RAD ONC ARIA COURSE FIRST TREATMENT DATE: NORMAL
RAD ONC ARIA COURSE ID: NORMAL
RAD ONC ARIA COURSE INTENT: NORMAL
RAD ONC ARIA COURSE LAST TREATMENT DATE: NORMAL
RAD ONC ARIA COURSE SESSION NUMBER: 9
RAD ONC ARIA COURSE START DATE: NORMAL
RAD ONC ARIA COURSE TREATMENT ELAPSED DAYS: 13
RAD ONC ARIA PLAN FRACTIONS TREATED TO DATE: 9
RAD ONC ARIA PLAN ID: NORMAL
RAD ONC ARIA PLAN PRESCRIBED DOSE PER FRACTION: 2.5 GY
RAD ONC ARIA PLAN PRIMARY REFERENCE POINT: NORMAL
RAD ONC ARIA PLAN TOTAL FRACTIONS PRESCRIBED: 28
RAD ONC ARIA PLAN TOTAL PRESCRIBED DOSE: 7000 CGY
RAD ONC ARIA REFERENCE POINT DOSAGE GIVEN TO DATE: 22.5 GY
RAD ONC ARIA REFERENCE POINT ID: NORMAL
RAD ONC ARIA REFERENCE POINT SESSION DOSAGE GIVEN: 2.5 GY

## 2024-04-01 PROCEDURE — 77385 HC NTSTY MODUL RAD TX DLVR SMPL: CPT

## 2024-04-02 ENCOUNTER — HOSPITAL ENCOUNTER (OUTPATIENT)
Facility: HOSPITAL | Age: 74
Setting detail: RECURRING SERIES
Discharge: HOME | End: 2024-04-05
Attending: RADIOLOGY
Payer: OTHER GOVERNMENT

## 2024-04-02 LAB
RAD ONC ARIA COURSE FIRST TREATMENT DATE: NORMAL
RAD ONC ARIA COURSE ID: NORMAL
RAD ONC ARIA COURSE INTENT: NORMAL
RAD ONC ARIA COURSE LAST TREATMENT DATE: NORMAL
RAD ONC ARIA COURSE SESSION NUMBER: 10
RAD ONC ARIA COURSE START DATE: NORMAL
RAD ONC ARIA COURSE TREATMENT ELAPSED DAYS: 14
RAD ONC ARIA PLAN FRACTIONS TREATED TO DATE: 10
RAD ONC ARIA PLAN ID: NORMAL
RAD ONC ARIA PLAN PRESCRIBED DOSE PER FRACTION: 2.5 GY
RAD ONC ARIA PLAN PRIMARY REFERENCE POINT: NORMAL
RAD ONC ARIA PLAN TOTAL FRACTIONS PRESCRIBED: 28
RAD ONC ARIA PLAN TOTAL PRESCRIBED DOSE: 7000 CGY
RAD ONC ARIA REFERENCE POINT DOSAGE GIVEN TO DATE: 25 GY
RAD ONC ARIA REFERENCE POINT ID: NORMAL
RAD ONC ARIA REFERENCE POINT SESSION DOSAGE GIVEN: 2.5 GY

## 2024-04-02 PROCEDURE — 77336 RADIATION PHYSICS CONSULT: CPT

## 2024-04-02 PROCEDURE — 77385 HC NTSTY MODUL RAD TX DLVR SMPL: CPT

## 2024-04-03 ENCOUNTER — HOSPITAL ENCOUNTER (OUTPATIENT)
Facility: HOSPITAL | Age: 74
Setting detail: RECURRING SERIES
Discharge: HOME | End: 2024-04-06
Attending: RADIOLOGY
Payer: OTHER GOVERNMENT

## 2024-04-03 LAB
RAD ONC ARIA COURSE FIRST TREATMENT DATE: NORMAL
RAD ONC ARIA COURSE ID: NORMAL
RAD ONC ARIA COURSE INTENT: NORMAL
RAD ONC ARIA COURSE LAST TREATMENT DATE: NORMAL
RAD ONC ARIA COURSE SESSION NUMBER: 11
RAD ONC ARIA COURSE START DATE: NORMAL
RAD ONC ARIA COURSE TREATMENT ELAPSED DAYS: 15
RAD ONC ARIA PLAN FRACTIONS TREATED TO DATE: 11
RAD ONC ARIA PLAN ID: NORMAL
RAD ONC ARIA PLAN PRESCRIBED DOSE PER FRACTION: 2.5 GY
RAD ONC ARIA PLAN PRIMARY REFERENCE POINT: NORMAL
RAD ONC ARIA PLAN TOTAL FRACTIONS PRESCRIBED: 28
RAD ONC ARIA PLAN TOTAL PRESCRIBED DOSE: 7000 CGY
RAD ONC ARIA REFERENCE POINT DOSAGE GIVEN TO DATE: 27.5 GY
RAD ONC ARIA REFERENCE POINT ID: NORMAL
RAD ONC ARIA REFERENCE POINT SESSION DOSAGE GIVEN: 2.5 GY

## 2024-04-04 ENCOUNTER — HOSPITAL ENCOUNTER (OUTPATIENT)
Facility: HOSPITAL | Age: 74
Setting detail: RECURRING SERIES
Discharge: HOME | End: 2024-04-07
Attending: RADIOLOGY
Payer: OTHER GOVERNMENT

## 2024-04-04 LAB
RAD ONC ARIA COURSE FIRST TREATMENT DATE: NORMAL
RAD ONC ARIA COURSE ID: NORMAL
RAD ONC ARIA COURSE INTENT: NORMAL
RAD ONC ARIA COURSE LAST TREATMENT DATE: NORMAL
RAD ONC ARIA COURSE SESSION NUMBER: 12
RAD ONC ARIA COURSE START DATE: NORMAL
RAD ONC ARIA COURSE TREATMENT ELAPSED DAYS: 16
RAD ONC ARIA PLAN FRACTIONS TREATED TO DATE: 12
RAD ONC ARIA PLAN ID: NORMAL
RAD ONC ARIA PLAN PRESCRIBED DOSE PER FRACTION: 2.5 GY
RAD ONC ARIA PLAN PRIMARY REFERENCE POINT: NORMAL
RAD ONC ARIA PLAN TOTAL FRACTIONS PRESCRIBED: 28
RAD ONC ARIA PLAN TOTAL PRESCRIBED DOSE: 7000 CGY
RAD ONC ARIA REFERENCE POINT DOSAGE GIVEN TO DATE: 30 GY
RAD ONC ARIA REFERENCE POINT ID: NORMAL
RAD ONC ARIA REFERENCE POINT SESSION DOSAGE GIVEN: 2.5 GY

## 2024-04-04 PROCEDURE — 77014 CHG CT GUIDANCE RADIATION THERAPY FLDS PLACEMENT: CPT | Performed by: RADIOLOGY

## 2024-04-04 PROCEDURE — 77385 HC NTSTY MODUL RAD TX DLVR SMPL: CPT

## 2024-04-05 ENCOUNTER — HOSPITAL ENCOUNTER (OUTPATIENT)
Facility: HOSPITAL | Age: 74
Setting detail: RECURRING SERIES
Discharge: HOME | End: 2024-04-08
Attending: RADIOLOGY
Payer: OTHER GOVERNMENT

## 2024-04-05 LAB
RAD ONC ARIA COURSE FIRST TREATMENT DATE: NORMAL
RAD ONC ARIA COURSE ID: NORMAL
RAD ONC ARIA COURSE INTENT: NORMAL
RAD ONC ARIA COURSE LAST TREATMENT DATE: NORMAL
RAD ONC ARIA COURSE SESSION NUMBER: 13
RAD ONC ARIA COURSE START DATE: NORMAL
RAD ONC ARIA COURSE TREATMENT ELAPSED DAYS: 17
RAD ONC ARIA PLAN FRACTIONS TREATED TO DATE: 13
RAD ONC ARIA PLAN ID: NORMAL
RAD ONC ARIA PLAN PRESCRIBED DOSE PER FRACTION: 2.5 GY
RAD ONC ARIA PLAN PRIMARY REFERENCE POINT: NORMAL
RAD ONC ARIA PLAN TOTAL FRACTIONS PRESCRIBED: 28
RAD ONC ARIA PLAN TOTAL PRESCRIBED DOSE: 7000 CGY
RAD ONC ARIA REFERENCE POINT DOSAGE GIVEN TO DATE: 32.5 GY
RAD ONC ARIA REFERENCE POINT ID: NORMAL
RAD ONC ARIA REFERENCE POINT SESSION DOSAGE GIVEN: 2.5 GY

## 2024-04-05 PROCEDURE — 77014 CHG CT GUIDANCE RADIATION THERAPY FLDS PLACEMENT: CPT | Performed by: RADIOLOGY

## 2024-04-05 PROCEDURE — 77385 HC NTSTY MODUL RAD TX DLVR SMPL: CPT

## 2024-04-08 ENCOUNTER — HOSPITAL ENCOUNTER (OUTPATIENT)
Facility: HOSPITAL | Age: 74
Setting detail: RECURRING SERIES
Discharge: HOME | End: 2024-04-11
Attending: RADIOLOGY
Payer: OTHER GOVERNMENT

## 2024-04-08 LAB
RAD ONC ARIA COURSE FIRST TREATMENT DATE: NORMAL
RAD ONC ARIA COURSE ID: NORMAL
RAD ONC ARIA COURSE INTENT: NORMAL
RAD ONC ARIA COURSE LAST TREATMENT DATE: NORMAL
RAD ONC ARIA COURSE SESSION NUMBER: 14
RAD ONC ARIA COURSE START DATE: NORMAL
RAD ONC ARIA COURSE TREATMENT ELAPSED DAYS: 20
RAD ONC ARIA PLAN FRACTIONS TREATED TO DATE: 14
RAD ONC ARIA PLAN ID: NORMAL
RAD ONC ARIA PLAN PRESCRIBED DOSE PER FRACTION: 2.5 GY
RAD ONC ARIA PLAN PRIMARY REFERENCE POINT: NORMAL
RAD ONC ARIA PLAN TOTAL FRACTIONS PRESCRIBED: 28
RAD ONC ARIA PLAN TOTAL PRESCRIBED DOSE: 7000 CGY
RAD ONC ARIA REFERENCE POINT DOSAGE GIVEN TO DATE: 35 GY
RAD ONC ARIA REFERENCE POINT ID: NORMAL
RAD ONC ARIA REFERENCE POINT SESSION DOSAGE GIVEN: 2.5 GY

## 2024-04-08 PROCEDURE — 77014 CHG CT GUIDANCE RADIATION THERAPY FLDS PLACEMENT: CPT | Performed by: RADIOLOGY

## 2024-04-08 PROCEDURE — 77385 HC NTSTY MODUL RAD TX DLVR SMPL: CPT

## 2024-04-09 ENCOUNTER — HOSPITAL ENCOUNTER (OUTPATIENT)
Facility: HOSPITAL | Age: 74
Setting detail: RECURRING SERIES
Discharge: HOME | End: 2024-04-12
Attending: RADIOLOGY
Payer: OTHER GOVERNMENT

## 2024-04-09 DIAGNOSIS — C61 PROSTATE CA (HCC): Primary | ICD-10-CM

## 2024-04-09 LAB
RAD ONC ARIA COURSE FIRST TREATMENT DATE: NORMAL
RAD ONC ARIA COURSE ID: NORMAL
RAD ONC ARIA COURSE INTENT: NORMAL
RAD ONC ARIA COURSE LAST TREATMENT DATE: NORMAL
RAD ONC ARIA COURSE SESSION NUMBER: 15
RAD ONC ARIA COURSE START DATE: NORMAL
RAD ONC ARIA COURSE TREATMENT ELAPSED DAYS: 21
RAD ONC ARIA PLAN FRACTIONS TREATED TO DATE: 15
RAD ONC ARIA PLAN ID: NORMAL
RAD ONC ARIA PLAN PRESCRIBED DOSE PER FRACTION: 2.5 GY
RAD ONC ARIA PLAN PRIMARY REFERENCE POINT: NORMAL
RAD ONC ARIA PLAN TOTAL FRACTIONS PRESCRIBED: 28
RAD ONC ARIA PLAN TOTAL PRESCRIBED DOSE: 7000 CGY
RAD ONC ARIA REFERENCE POINT DOSAGE GIVEN TO DATE: 37.5 GY
RAD ONC ARIA REFERENCE POINT ID: NORMAL
RAD ONC ARIA REFERENCE POINT SESSION DOSAGE GIVEN: 2.5 GY

## 2024-04-09 PROCEDURE — 77336 RADIATION PHYSICS CONSULT: CPT

## 2024-04-09 PROCEDURE — 77385 HC NTSTY MODUL RAD TX DLVR SMPL: CPT

## 2024-04-09 PROCEDURE — 77014 CHG CT GUIDANCE RADIATION THERAPY FLDS PLACEMENT: CPT | Performed by: RADIOLOGY

## 2024-04-10 ENCOUNTER — HOSPITAL ENCOUNTER (OUTPATIENT)
Facility: HOSPITAL | Age: 74
Setting detail: RECURRING SERIES
Discharge: HOME | End: 2024-04-13
Attending: RADIOLOGY
Payer: OTHER GOVERNMENT

## 2024-04-10 LAB
RAD ONC ARIA COURSE FIRST TREATMENT DATE: NORMAL
RAD ONC ARIA COURSE ID: NORMAL
RAD ONC ARIA COURSE INTENT: NORMAL
RAD ONC ARIA COURSE LAST TREATMENT DATE: NORMAL
RAD ONC ARIA COURSE SESSION NUMBER: 16
RAD ONC ARIA COURSE START DATE: NORMAL
RAD ONC ARIA COURSE TREATMENT ELAPSED DAYS: 22
RAD ONC ARIA PLAN FRACTIONS TREATED TO DATE: 16
RAD ONC ARIA PLAN ID: NORMAL
RAD ONC ARIA PLAN PRESCRIBED DOSE PER FRACTION: 2.5 GY
RAD ONC ARIA PLAN PRIMARY REFERENCE POINT: NORMAL
RAD ONC ARIA PLAN TOTAL FRACTIONS PRESCRIBED: 28
RAD ONC ARIA PLAN TOTAL PRESCRIBED DOSE: 7000 CGY
RAD ONC ARIA REFERENCE POINT DOSAGE GIVEN TO DATE: 40 GY
RAD ONC ARIA REFERENCE POINT ID: NORMAL
RAD ONC ARIA REFERENCE POINT SESSION DOSAGE GIVEN: 2.5 GY

## 2024-04-10 PROCEDURE — 77385 HC NTSTY MODUL RAD TX DLVR SMPL: CPT

## 2024-04-10 PROCEDURE — 77014 CHG CT GUIDANCE RADIATION THERAPY FLDS PLACEMENT: CPT | Performed by: RADIOLOGY

## 2024-04-11 ENCOUNTER — HOSPITAL ENCOUNTER (OUTPATIENT)
Facility: HOSPITAL | Age: 74
Setting detail: RECURRING SERIES
Discharge: HOME | End: 2024-04-14
Attending: RADIOLOGY
Payer: OTHER GOVERNMENT

## 2024-04-11 LAB
RAD ONC ARIA COURSE FIRST TREATMENT DATE: NORMAL
RAD ONC ARIA COURSE ID: NORMAL
RAD ONC ARIA COURSE INTENT: NORMAL
RAD ONC ARIA COURSE LAST TREATMENT DATE: NORMAL
RAD ONC ARIA COURSE SESSION NUMBER: 17
RAD ONC ARIA COURSE START DATE: NORMAL
RAD ONC ARIA COURSE TREATMENT ELAPSED DAYS: 23
RAD ONC ARIA PLAN FRACTIONS TREATED TO DATE: 17
RAD ONC ARIA PLAN ID: NORMAL
RAD ONC ARIA PLAN PRESCRIBED DOSE PER FRACTION: 2.5 GY
RAD ONC ARIA PLAN PRIMARY REFERENCE POINT: NORMAL
RAD ONC ARIA PLAN TOTAL FRACTIONS PRESCRIBED: 28
RAD ONC ARIA PLAN TOTAL PRESCRIBED DOSE: 7000 CGY
RAD ONC ARIA REFERENCE POINT DOSAGE GIVEN TO DATE: 42.5 GY
RAD ONC ARIA REFERENCE POINT ID: NORMAL
RAD ONC ARIA REFERENCE POINT SESSION DOSAGE GIVEN: 2.5 GY

## 2024-04-11 PROCEDURE — 77385 HC NTSTY MODUL RAD TX DLVR SMPL: CPT

## 2024-04-11 PROCEDURE — 77014 CHG CT GUIDANCE RADIATION THERAPY FLDS PLACEMENT: CPT | Performed by: RADIOLOGY

## 2024-04-12 ENCOUNTER — HOSPITAL ENCOUNTER (OUTPATIENT)
Facility: HOSPITAL | Age: 74
Setting detail: RECURRING SERIES
Discharge: HOME | End: 2024-04-15
Attending: RADIOLOGY
Payer: OTHER GOVERNMENT

## 2024-04-12 LAB
RAD ONC ARIA COURSE FIRST TREATMENT DATE: NORMAL
RAD ONC ARIA COURSE ID: NORMAL
RAD ONC ARIA COURSE INTENT: NORMAL
RAD ONC ARIA COURSE LAST TREATMENT DATE: NORMAL
RAD ONC ARIA COURSE SESSION NUMBER: 18
RAD ONC ARIA COURSE START DATE: NORMAL
RAD ONC ARIA COURSE TREATMENT ELAPSED DAYS: 24
RAD ONC ARIA PLAN FRACTIONS TREATED TO DATE: 18
RAD ONC ARIA PLAN ID: NORMAL
RAD ONC ARIA PLAN PRESCRIBED DOSE PER FRACTION: 2.5 GY
RAD ONC ARIA PLAN PRIMARY REFERENCE POINT: NORMAL
RAD ONC ARIA PLAN TOTAL FRACTIONS PRESCRIBED: 28
RAD ONC ARIA PLAN TOTAL PRESCRIBED DOSE: 7000 CGY
RAD ONC ARIA REFERENCE POINT DOSAGE GIVEN TO DATE: 45 GY
RAD ONC ARIA REFERENCE POINT ID: NORMAL
RAD ONC ARIA REFERENCE POINT SESSION DOSAGE GIVEN: 2.5 GY

## 2024-04-12 PROCEDURE — 77385 HC NTSTY MODUL RAD TX DLVR SMPL: CPT

## 2024-04-15 ENCOUNTER — HOSPITAL ENCOUNTER (OUTPATIENT)
Facility: HOSPITAL | Age: 74
Setting detail: RECURRING SERIES
Discharge: HOME | End: 2024-04-18
Attending: RADIOLOGY
Payer: OTHER GOVERNMENT

## 2024-04-15 LAB
RAD ONC ARIA COURSE FIRST TREATMENT DATE: NORMAL
RAD ONC ARIA COURSE ID: NORMAL
RAD ONC ARIA COURSE INTENT: NORMAL
RAD ONC ARIA COURSE LAST TREATMENT DATE: NORMAL
RAD ONC ARIA COURSE SESSION NUMBER: 19
RAD ONC ARIA COURSE START DATE: NORMAL
RAD ONC ARIA COURSE TREATMENT ELAPSED DAYS: 27
RAD ONC ARIA PLAN FRACTIONS TREATED TO DATE: 19
RAD ONC ARIA PLAN ID: NORMAL
RAD ONC ARIA PLAN PRESCRIBED DOSE PER FRACTION: 2.5 GY
RAD ONC ARIA PLAN PRIMARY REFERENCE POINT: NORMAL
RAD ONC ARIA PLAN TOTAL FRACTIONS PRESCRIBED: 28
RAD ONC ARIA PLAN TOTAL PRESCRIBED DOSE: 7000 CGY
RAD ONC ARIA REFERENCE POINT DOSAGE GIVEN TO DATE: 47.5 GY
RAD ONC ARIA REFERENCE POINT ID: NORMAL
RAD ONC ARIA REFERENCE POINT SESSION DOSAGE GIVEN: 2.5 GY

## 2024-04-15 PROCEDURE — 77385 HC NTSTY MODUL RAD TX DLVR SMPL: CPT

## 2024-04-15 PROCEDURE — 77014 CHG CT GUIDANCE RADIATION THERAPY FLDS PLACEMENT: CPT | Performed by: RADIOLOGY

## 2024-04-16 ENCOUNTER — HOSPITAL ENCOUNTER (OUTPATIENT)
Facility: HOSPITAL | Age: 74
Setting detail: RECURRING SERIES
Discharge: HOME | End: 2024-04-19
Attending: RADIOLOGY
Payer: OTHER GOVERNMENT

## 2024-04-16 LAB
RAD ONC ARIA COURSE FIRST TREATMENT DATE: NORMAL
RAD ONC ARIA COURSE ID: NORMAL
RAD ONC ARIA COURSE INTENT: NORMAL
RAD ONC ARIA COURSE LAST TREATMENT DATE: NORMAL
RAD ONC ARIA COURSE SESSION NUMBER: 20
RAD ONC ARIA COURSE START DATE: NORMAL
RAD ONC ARIA COURSE TREATMENT ELAPSED DAYS: 28
RAD ONC ARIA PLAN FRACTIONS TREATED TO DATE: 20
RAD ONC ARIA PLAN ID: NORMAL
RAD ONC ARIA PLAN PRESCRIBED DOSE PER FRACTION: 2.5 GY
RAD ONC ARIA PLAN PRIMARY REFERENCE POINT: NORMAL
RAD ONC ARIA PLAN TOTAL FRACTIONS PRESCRIBED: 28
RAD ONC ARIA PLAN TOTAL PRESCRIBED DOSE: 7000 CGY
RAD ONC ARIA REFERENCE POINT DOSAGE GIVEN TO DATE: 50 GY
RAD ONC ARIA REFERENCE POINT ID: NORMAL
RAD ONC ARIA REFERENCE POINT SESSION DOSAGE GIVEN: 2.5 GY

## 2024-04-16 PROCEDURE — 77014 CHG CT GUIDANCE RADIATION THERAPY FLDS PLACEMENT: CPT | Performed by: RADIOLOGY

## 2024-04-16 PROCEDURE — 77385 HC NTSTY MODUL RAD TX DLVR SMPL: CPT

## 2024-04-16 PROCEDURE — 77336 RADIATION PHYSICS CONSULT: CPT

## 2024-04-17 ENCOUNTER — HOSPITAL ENCOUNTER (OUTPATIENT)
Facility: HOSPITAL | Age: 74
Setting detail: RECURRING SERIES
Discharge: HOME | End: 2024-04-20
Attending: RADIOLOGY
Payer: OTHER GOVERNMENT

## 2024-04-17 LAB
RAD ONC ARIA COURSE FIRST TREATMENT DATE: NORMAL
RAD ONC ARIA COURSE ID: NORMAL
RAD ONC ARIA COURSE INTENT: NORMAL
RAD ONC ARIA COURSE LAST TREATMENT DATE: NORMAL
RAD ONC ARIA COURSE SESSION NUMBER: 21
RAD ONC ARIA COURSE START DATE: NORMAL
RAD ONC ARIA COURSE TREATMENT ELAPSED DAYS: 29
RAD ONC ARIA PLAN FRACTIONS TREATED TO DATE: 21
RAD ONC ARIA PLAN ID: NORMAL
RAD ONC ARIA PLAN PRESCRIBED DOSE PER FRACTION: 2.5 GY
RAD ONC ARIA PLAN PRIMARY REFERENCE POINT: NORMAL
RAD ONC ARIA PLAN TOTAL FRACTIONS PRESCRIBED: 28
RAD ONC ARIA PLAN TOTAL PRESCRIBED DOSE: 7000 CGY
RAD ONC ARIA REFERENCE POINT DOSAGE GIVEN TO DATE: 52.5 GY
RAD ONC ARIA REFERENCE POINT ID: NORMAL
RAD ONC ARIA REFERENCE POINT SESSION DOSAGE GIVEN: 2.5 GY

## 2024-04-17 PROCEDURE — 77385 HC NTSTY MODUL RAD TX DLVR SMPL: CPT

## 2024-04-18 ENCOUNTER — HOSPITAL ENCOUNTER (OUTPATIENT)
Facility: HOSPITAL | Age: 74
Setting detail: RECURRING SERIES
Discharge: HOME | End: 2024-04-21
Attending: RADIOLOGY
Payer: OTHER GOVERNMENT

## 2024-04-18 LAB
RAD ONC ARIA COURSE FIRST TREATMENT DATE: NORMAL
RAD ONC ARIA COURSE ID: NORMAL
RAD ONC ARIA COURSE INTENT: NORMAL
RAD ONC ARIA COURSE LAST TREATMENT DATE: NORMAL
RAD ONC ARIA COURSE SESSION NUMBER: 22
RAD ONC ARIA COURSE START DATE: NORMAL
RAD ONC ARIA COURSE TREATMENT ELAPSED DAYS: 30
RAD ONC ARIA PLAN FRACTIONS TREATED TO DATE: 22
RAD ONC ARIA PLAN ID: NORMAL
RAD ONC ARIA PLAN PRESCRIBED DOSE PER FRACTION: 2.5 GY
RAD ONC ARIA PLAN PRIMARY REFERENCE POINT: NORMAL
RAD ONC ARIA PLAN TOTAL FRACTIONS PRESCRIBED: 28
RAD ONC ARIA PLAN TOTAL PRESCRIBED DOSE: 7000 CGY
RAD ONC ARIA REFERENCE POINT DOSAGE GIVEN TO DATE: 55 GY
RAD ONC ARIA REFERENCE POINT ID: NORMAL
RAD ONC ARIA REFERENCE POINT SESSION DOSAGE GIVEN: 2.5 GY

## 2024-04-18 PROCEDURE — 77014 CHG CT GUIDANCE RADIATION THERAPY FLDS PLACEMENT: CPT | Performed by: RADIOLOGY

## 2024-04-18 PROCEDURE — 77385 HC NTSTY MODUL RAD TX DLVR SMPL: CPT

## 2024-04-19 ENCOUNTER — HOSPITAL ENCOUNTER (OUTPATIENT)
Facility: HOSPITAL | Age: 74
Setting detail: RECURRING SERIES
Discharge: HOME | End: 2024-04-22
Attending: RADIOLOGY
Payer: OTHER GOVERNMENT

## 2024-04-19 LAB
RAD ONC ARIA COURSE FIRST TREATMENT DATE: NORMAL
RAD ONC ARIA COURSE ID: NORMAL
RAD ONC ARIA COURSE INTENT: NORMAL
RAD ONC ARIA COURSE LAST TREATMENT DATE: NORMAL
RAD ONC ARIA COURSE SESSION NUMBER: 23
RAD ONC ARIA COURSE START DATE: NORMAL
RAD ONC ARIA COURSE TREATMENT ELAPSED DAYS: 31
RAD ONC ARIA PLAN FRACTIONS TREATED TO DATE: 23
RAD ONC ARIA PLAN ID: NORMAL
RAD ONC ARIA PLAN PRESCRIBED DOSE PER FRACTION: 2.5 GY
RAD ONC ARIA PLAN PRIMARY REFERENCE POINT: NORMAL
RAD ONC ARIA PLAN TOTAL FRACTIONS PRESCRIBED: 28
RAD ONC ARIA PLAN TOTAL PRESCRIBED DOSE: 7000 CGY
RAD ONC ARIA REFERENCE POINT DOSAGE GIVEN TO DATE: 57.5 GY
RAD ONC ARIA REFERENCE POINT ID: NORMAL
RAD ONC ARIA REFERENCE POINT SESSION DOSAGE GIVEN: 2.5 GY

## 2024-04-19 PROCEDURE — 77385 HC NTSTY MODUL RAD TX DLVR SMPL: CPT

## 2024-04-22 ENCOUNTER — HOSPITAL ENCOUNTER (OUTPATIENT)
Facility: HOSPITAL | Age: 74
Setting detail: RECURRING SERIES
Discharge: HOME | End: 2024-04-25
Attending: RADIOLOGY
Payer: OTHER GOVERNMENT

## 2024-04-22 LAB
RAD ONC ARIA COURSE FIRST TREATMENT DATE: NORMAL
RAD ONC ARIA COURSE ID: NORMAL
RAD ONC ARIA COURSE INTENT: NORMAL
RAD ONC ARIA COURSE LAST TREATMENT DATE: NORMAL
RAD ONC ARIA COURSE SESSION NUMBER: 24
RAD ONC ARIA COURSE START DATE: NORMAL
RAD ONC ARIA COURSE TREATMENT ELAPSED DAYS: 34
RAD ONC ARIA PLAN FRACTIONS TREATED TO DATE: 24
RAD ONC ARIA PLAN ID: NORMAL
RAD ONC ARIA PLAN PRESCRIBED DOSE PER FRACTION: 2.5 GY
RAD ONC ARIA PLAN PRIMARY REFERENCE POINT: NORMAL
RAD ONC ARIA PLAN TOTAL FRACTIONS PRESCRIBED: 28
RAD ONC ARIA PLAN TOTAL PRESCRIBED DOSE: 7000 CGY
RAD ONC ARIA REFERENCE POINT DOSAGE GIVEN TO DATE: 60 GY
RAD ONC ARIA REFERENCE POINT ID: NORMAL
RAD ONC ARIA REFERENCE POINT SESSION DOSAGE GIVEN: 2.5 GY

## 2024-04-22 PROCEDURE — 77385 HC NTSTY MODUL RAD TX DLVR SMPL: CPT

## 2024-04-23 ENCOUNTER — HOSPITAL ENCOUNTER (OUTPATIENT)
Facility: HOSPITAL | Age: 74
Setting detail: RECURRING SERIES
Discharge: HOME | End: 2024-04-26
Attending: RADIOLOGY
Payer: OTHER GOVERNMENT

## 2024-04-23 LAB
RAD ONC ARIA COURSE FIRST TREATMENT DATE: NORMAL
RAD ONC ARIA COURSE ID: NORMAL
RAD ONC ARIA COURSE INTENT: NORMAL
RAD ONC ARIA COURSE LAST TREATMENT DATE: NORMAL
RAD ONC ARIA COURSE SESSION NUMBER: 25
RAD ONC ARIA COURSE START DATE: NORMAL
RAD ONC ARIA COURSE TREATMENT ELAPSED DAYS: 35
RAD ONC ARIA PLAN FRACTIONS TREATED TO DATE: 25
RAD ONC ARIA PLAN ID: NORMAL
RAD ONC ARIA PLAN PRESCRIBED DOSE PER FRACTION: 2.5 GY
RAD ONC ARIA PLAN PRIMARY REFERENCE POINT: NORMAL
RAD ONC ARIA PLAN TOTAL FRACTIONS PRESCRIBED: 28
RAD ONC ARIA PLAN TOTAL PRESCRIBED DOSE: 7000 CGY
RAD ONC ARIA REFERENCE POINT DOSAGE GIVEN TO DATE: 62.5 GY
RAD ONC ARIA REFERENCE POINT ID: NORMAL
RAD ONC ARIA REFERENCE POINT SESSION DOSAGE GIVEN: 2.5 GY

## 2024-04-23 PROCEDURE — 77385 HC NTSTY MODUL RAD TX DLVR SMPL: CPT

## 2024-04-23 PROCEDURE — 77014 CHG CT GUIDANCE RADIATION THERAPY FLDS PLACEMENT: CPT | Performed by: RADIOLOGY

## 2024-04-23 PROCEDURE — 77336 RADIATION PHYSICS CONSULT: CPT

## 2024-04-24 ENCOUNTER — HOSPITAL ENCOUNTER (OUTPATIENT)
Facility: HOSPITAL | Age: 74
Setting detail: RECURRING SERIES
Discharge: HOME | End: 2024-04-27
Attending: RADIOLOGY
Payer: OTHER GOVERNMENT

## 2024-04-24 LAB
RAD ONC ARIA COURSE FIRST TREATMENT DATE: NORMAL
RAD ONC ARIA COURSE ID: NORMAL
RAD ONC ARIA COURSE INTENT: NORMAL
RAD ONC ARIA COURSE LAST TREATMENT DATE: NORMAL
RAD ONC ARIA COURSE SESSION NUMBER: 26
RAD ONC ARIA COURSE START DATE: NORMAL
RAD ONC ARIA COURSE TREATMENT ELAPSED DAYS: 36
RAD ONC ARIA PLAN FRACTIONS TREATED TO DATE: 26
RAD ONC ARIA PLAN ID: NORMAL
RAD ONC ARIA PLAN PRESCRIBED DOSE PER FRACTION: 2.5 GY
RAD ONC ARIA PLAN PRIMARY REFERENCE POINT: NORMAL
RAD ONC ARIA PLAN TOTAL FRACTIONS PRESCRIBED: 28
RAD ONC ARIA PLAN TOTAL PRESCRIBED DOSE: 7000 CGY
RAD ONC ARIA REFERENCE POINT DOSAGE GIVEN TO DATE: 65 GY
RAD ONC ARIA REFERENCE POINT ID: NORMAL
RAD ONC ARIA REFERENCE POINT SESSION DOSAGE GIVEN: 2.5 GY

## 2024-04-24 PROCEDURE — 77014 CHG CT GUIDANCE RADIATION THERAPY FLDS PLACEMENT: CPT | Performed by: RADIOLOGY

## 2024-04-24 PROCEDURE — 77385 HC NTSTY MODUL RAD TX DLVR SMPL: CPT

## 2024-04-25 ENCOUNTER — HOSPITAL ENCOUNTER (OUTPATIENT)
Facility: HOSPITAL | Age: 74
Setting detail: RECURRING SERIES
Discharge: HOME | End: 2024-04-28
Attending: RADIOLOGY
Payer: OTHER GOVERNMENT

## 2024-04-25 LAB
RAD ONC ARIA COURSE FIRST TREATMENT DATE: NORMAL
RAD ONC ARIA COURSE ID: NORMAL
RAD ONC ARIA COURSE INTENT: NORMAL
RAD ONC ARIA COURSE LAST TREATMENT DATE: NORMAL
RAD ONC ARIA COURSE SESSION NUMBER: 27
RAD ONC ARIA COURSE START DATE: NORMAL
RAD ONC ARIA COURSE TREATMENT ELAPSED DAYS: 37
RAD ONC ARIA PLAN FRACTIONS TREATED TO DATE: 27
RAD ONC ARIA PLAN ID: NORMAL
RAD ONC ARIA PLAN PRESCRIBED DOSE PER FRACTION: 2.5 GY
RAD ONC ARIA PLAN PRIMARY REFERENCE POINT: NORMAL
RAD ONC ARIA PLAN TOTAL FRACTIONS PRESCRIBED: 28
RAD ONC ARIA PLAN TOTAL PRESCRIBED DOSE: 7000 CGY
RAD ONC ARIA REFERENCE POINT DOSAGE GIVEN TO DATE: 67.5 GY
RAD ONC ARIA REFERENCE POINT ID: NORMAL
RAD ONC ARIA REFERENCE POINT SESSION DOSAGE GIVEN: 2.5 GY

## 2024-04-25 PROCEDURE — 77014 CHG CT GUIDANCE RADIATION THERAPY FLDS PLACEMENT: CPT | Performed by: RADIOLOGY

## 2024-04-25 PROCEDURE — 77385 HC NTSTY MODUL RAD TX DLVR SMPL: CPT

## 2024-04-26 ENCOUNTER — APPOINTMENT (OUTPATIENT)
Facility: HOSPITAL | Age: 74
End: 2024-04-26
Attending: RADIOLOGY
Payer: OTHER GOVERNMENT

## 2024-04-27 ENCOUNTER — HOSPITAL ENCOUNTER (EMERGENCY)
Facility: HOSPITAL | Age: 74
Discharge: HOME | End: 2024-04-27
Attending: EMERGENCY MEDICINE
Payer: MEDICARE

## 2024-04-27 ENCOUNTER — APPOINTMENT (OUTPATIENT)
Facility: HOSPITAL | Age: 74
End: 2024-04-27
Payer: MEDICARE

## 2024-04-27 VITALS
RESPIRATION RATE: 22 BRPM | HEIGHT: 73 IN | HEART RATE: 103 BPM | TEMPERATURE: 98.5 F | OXYGEN SATURATION: 97 % | WEIGHT: 210 LBS | BODY MASS INDEX: 27.83 KG/M2 | SYSTOLIC BLOOD PRESSURE: 107 MMHG | DIASTOLIC BLOOD PRESSURE: 71 MMHG

## 2024-04-27 DIAGNOSIS — R32 URINARY INCONTINENCE, UNSPECIFIED TYPE: ICD-10-CM

## 2024-04-27 DIAGNOSIS — J44.1 COPD EXACERBATION (HCC): Primary | ICD-10-CM

## 2024-04-27 DIAGNOSIS — C61 PROSTATE CANCER (HCC): ICD-10-CM

## 2024-04-27 LAB
ALBUMIN SERPL-MCNC: 3.5 G/DL (ref 3.4–5)
ALBUMIN/GLOB SERPL: 1 (ref 0.8–1.7)
ALP SERPL-CCNC: 64 U/L (ref 45–117)
ALT SERPL-CCNC: 27 U/L (ref 16–61)
ANION GAP BLD CALC-SCNC: ABNORMAL MMOL/L (ref 10–20)
ANION GAP SERPL CALC-SCNC: 11 MMOL/L (ref 3–18)
APPEARANCE UR: CLEAR
AST SERPL-CCNC: 20 U/L (ref 10–38)
BACTERIA URNS QL MICRO: NEGATIVE /HPF
BASE EXCESS BLD CALC-SCNC: 2.2 MMOL/L
BASOPHILS # BLD: 0 K/UL (ref 0–0.1)
BASOPHILS NFR BLD: 1 % (ref 0–2)
BILIRUB SERPL-MCNC: 2.5 MG/DL (ref 0.2–1)
BILIRUB UR QL: NEGATIVE
BUN SERPL-MCNC: 25 MG/DL (ref 7–18)
BUN/CREAT SERPL: 20 (ref 12–20)
CA-I BLD-MCNC: 1.15 MMOL/L (ref 1.12–1.32)
CALCIUM SERPL-MCNC: 9 MG/DL (ref 8.5–10.1)
CHLORIDE BLD-SCNC: 106 MMOL/L (ref 98–107)
CHLORIDE SERPL-SCNC: 104 MMOL/L (ref 100–111)
CO2 BLD-SCNC: 27 MMOL/L (ref 19–24)
CO2 SERPL-SCNC: 24 MMOL/L (ref 21–32)
COLOR UR: ABNORMAL
CREAT BLD-MCNC: 0.73 MG/DL (ref 0.6–1.3)
CREAT SERPL-MCNC: 1.26 MG/DL (ref 0.6–1.3)
DIFFERENTIAL METHOD BLD: ABNORMAL
EKG ATRIAL RATE: 118 BPM
EKG DIAGNOSIS: NORMAL
EKG P-R INTERVAL: 148 MS
EKG Q-T INTERVAL: 328 MS
EKG QRS DURATION: 82 MS
EKG QTC CALCULATION (BAZETT): 459 MS
EKG R AXIS: -29 DEGREES
EKG T AXIS: 80 DEGREES
EKG VENTRICULAR RATE: 118 BPM
EOSINOPHIL # BLD: 0.2 K/UL (ref 0–0.4)
EOSINOPHIL NFR BLD: 3 % (ref 0–5)
EPITH CASTS URNS QL MICRO: NORMAL /LPF (ref 0–5)
ERYTHROCYTE [DISTWIDTH] IN BLOOD BY AUTOMATED COUNT: 17.4 % (ref 11.6–14.5)
GLOBULIN SER CALC-MCNC: 3.4 G/DL (ref 2–4)
GLUCOSE BLD-MCNC: 97 MG/DL (ref 65–100)
GLUCOSE SERPL-MCNC: 104 MG/DL (ref 74–99)
GLUCOSE UR STRIP.AUTO-MCNC: NEGATIVE MG/DL
HCO3 BLD-SCNC: 27.7 MMOL/L (ref 22–26)
HCT VFR BLD AUTO: 42.6 % (ref 36–48)
HGB BLD-MCNC: 13.8 G/DL (ref 13–16)
HGB UR QL STRIP: NEGATIVE
IMM GRANULOCYTES # BLD AUTO: 0 K/UL (ref 0–0.04)
IMM GRANULOCYTES NFR BLD AUTO: 0 % (ref 0–0.5)
KETONES UR QL STRIP.AUTO: 15 MG/DL
LACTATE BLD-SCNC: 0.64 MMOL/L (ref 0.4–2)
LACTATE BLD-SCNC: 2.42 MMOL/L (ref 0.4–2)
LEUKOCYTE ESTERASE UR QL STRIP.AUTO: NEGATIVE
LYMPHOCYTES # BLD: 1.7 K/UL (ref 0.9–3.6)
LYMPHOCYTES NFR BLD: 23 % (ref 21–52)
MAGNESIUM SERPL-MCNC: 1.8 MG/DL (ref 1.6–2.6)
MCH RBC QN AUTO: 29.7 PG (ref 24–34)
MCHC RBC AUTO-ENTMCNC: 32.4 G/DL (ref 31–37)
MCV RBC AUTO: 91.6 FL (ref 78–100)
MONOCYTES # BLD: 0.8 K/UL (ref 0.05–1.2)
MONOCYTES NFR BLD: 11 % (ref 3–10)
NEUTS SEG # BLD: 4.6 K/UL (ref 1.8–8)
NEUTS SEG NFR BLD: 62 % (ref 40–73)
NITRITE UR QL STRIP.AUTO: NEGATIVE
NRBC # BLD: 0 K/UL (ref 0–0.01)
NRBC BLD-RTO: 0 PER 100 WBC
PCO2 BLDV: 45 MMHG (ref 41–51)
PH BLDV: 7.4 (ref 7.32–7.42)
PH UR STRIP: 5 (ref 5–8)
PLATELET # BLD AUTO: 126 K/UL (ref 135–420)
PMV BLD AUTO: 9.3 FL (ref 9.2–11.8)
PO2 BLDV: 36 MMHG (ref 25–40)
POTASSIUM BLD-SCNC: 5.4 MMOL/L (ref 3.5–5.1)
POTASSIUM SERPL-SCNC: 4.1 MMOL/L (ref 3.5–5.5)
PROT SERPL-MCNC: 6.9 G/DL (ref 6.4–8.2)
PROT UR STRIP-MCNC: ABNORMAL MG/DL
RBC # BLD AUTO: 4.65 M/UL (ref 4.35–5.65)
RBC #/AREA URNS HPF: NORMAL /HPF (ref 0–5)
SAO2 % BLD: 69 %
SERVICE CMNT-IMP: ABNORMAL
SODIUM BLD-SCNC: 139 MMOL/L (ref 136–145)
SODIUM SERPL-SCNC: 139 MMOL/L (ref 136–145)
SP GR UR REFRACTOMETRY: 1.03 (ref 1–1.03)
SPECIMEN SITE: ABNORMAL
TROPONIN I SERPL HS-MCNC: 16 NG/L (ref 0–78)
UROBILINOGEN UR QL STRIP.AUTO: 1 EU/DL (ref 0.2–1)
WBC # BLD AUTO: 7.4 K/UL (ref 4.6–13.2)
WBC URNS QL MICRO: NORMAL /HPF (ref 0–5)

## 2024-04-27 PROCEDURE — 82803 BLOOD GASES ANY COMBINATION: CPT

## 2024-04-27 PROCEDURE — 2580000003 HC RX 258: Performed by: EMERGENCY MEDICINE

## 2024-04-27 PROCEDURE — 82947 ASSAY GLUCOSE BLOOD QUANT: CPT

## 2024-04-27 PROCEDURE — 84132 ASSAY OF SERUM POTASSIUM: CPT

## 2024-04-27 PROCEDURE — 71275 CT ANGIOGRAPHY CHEST: CPT

## 2024-04-27 PROCEDURE — 96374 THER/PROPH/DIAG INJ IV PUSH: CPT

## 2024-04-27 PROCEDURE — 84295 ASSAY OF SERUM SODIUM: CPT

## 2024-04-27 PROCEDURE — 82330 ASSAY OF CALCIUM: CPT

## 2024-04-27 PROCEDURE — 81001 URINALYSIS AUTO W/SCOPE: CPT

## 2024-04-27 PROCEDURE — 85025 COMPLETE CBC W/AUTO DIFF WBC: CPT

## 2024-04-27 PROCEDURE — 80053 COMPREHEN METABOLIC PANEL: CPT

## 2024-04-27 PROCEDURE — 99285 EMERGENCY DEPT VISIT HI MDM: CPT

## 2024-04-27 PROCEDURE — 71045 X-RAY EXAM CHEST 1 VIEW: CPT

## 2024-04-27 PROCEDURE — 83605 ASSAY OF LACTIC ACID: CPT

## 2024-04-27 PROCEDURE — 6360000004 HC RX CONTRAST MEDICATION: Performed by: EMERGENCY MEDICINE

## 2024-04-27 PROCEDURE — 84484 ASSAY OF TROPONIN QUANT: CPT

## 2024-04-27 PROCEDURE — 83735 ASSAY OF MAGNESIUM: CPT

## 2024-04-27 PROCEDURE — 6370000000 HC RX 637 (ALT 250 FOR IP): Performed by: EMERGENCY MEDICINE

## 2024-04-27 PROCEDURE — 6360000002 HC RX W HCPCS: Performed by: EMERGENCY MEDICINE

## 2024-04-27 PROCEDURE — 85014 HEMATOCRIT: CPT

## 2024-04-27 RX ORDER — IPRATROPIUM BROMIDE AND ALBUTEROL SULFATE 2.5; .5 MG/3ML; MG/3ML
1 SOLUTION RESPIRATORY (INHALATION)
Status: COMPLETED | OUTPATIENT
Start: 2024-04-27 | End: 2024-04-27

## 2024-04-27 RX ORDER — LEVOFLOXACIN 500 MG/1
500 TABLET, FILM COATED ORAL DAILY
Qty: 7 TABLET | Refills: 0 | Status: SHIPPED | OUTPATIENT
Start: 2024-04-27 | End: 2024-05-04

## 2024-04-27 RX ORDER — PREDNISONE 20 MG/1
20 TABLET ORAL 2 TIMES DAILY
Qty: 10 TABLET | Refills: 0 | Status: SHIPPED | OUTPATIENT
Start: 2024-04-27 | End: 2024-05-02

## 2024-04-27 RX ORDER — ALBUTEROL SULFATE 90 UG/1
2 AEROSOL, METERED RESPIRATORY (INHALATION) EVERY 4 HOURS PRN
Qty: 18 G | Refills: 0 | Status: SHIPPED | OUTPATIENT
Start: 2024-04-27

## 2024-04-27 RX ORDER — TAMSULOSIN HYDROCHLORIDE 0.4 MG/1
0.4 CAPSULE ORAL DAILY
Qty: 30 CAPSULE | Refills: 0 | Status: SHIPPED | OUTPATIENT
Start: 2024-04-27

## 2024-04-27 RX ADMIN — IOPAMIDOL 85 ML: 755 INJECTION, SOLUTION INTRAVENOUS at 09:53

## 2024-04-27 RX ADMIN — IPRATROPIUM BROMIDE AND ALBUTEROL SULFATE 1 DOSE: 2.5; .5 SOLUTION RESPIRATORY (INHALATION) at 09:16

## 2024-04-27 RX ADMIN — IPRATROPIUM BROMIDE AND ALBUTEROL SULFATE 1 DOSE: 2.5; .5 SOLUTION RESPIRATORY (INHALATION) at 08:29

## 2024-04-27 RX ADMIN — WATER 125 MG: 1 INJECTION INTRAMUSCULAR; INTRAVENOUS; SUBCUTANEOUS at 08:29

## 2024-04-27 ASSESSMENT — PAIN - FUNCTIONAL ASSESSMENT: PAIN_FUNCTIONAL_ASSESSMENT: NONE - DENIES PAIN

## 2024-04-27 NOTE — ED NOTES
Discussed with the patient and all questioned fully answered. He will call me if any problems arise.

## 2024-04-27 NOTE — ED TRIAGE NOTES
Patient reports he has shortness of breath due to copd. Ems gave him neb tx brought o2 up to 98. Last did cocaine yesterday

## 2024-04-27 NOTE — ED PROVIDER NOTES
EMERGENCY DEPARTMENT HISTORY AND PHYSICAL EXAM    Date: 4/27/2024  Patient Name: Waqas De Leon    History of Presenting Illness     Chief Complaint   Patient presents with    Shortness of Breath         History Provided By: {[facility]:[id number]}    Additional History (Context):   8:24 AM EDT  Waqas De Leon is a 73 y.o. male with PMHX of *** who presents to the emergency department C/O ***    Social History  ***    Family History  ***    PCP: Edilia Gonzalez MD    No current facility-administered medications for this encounter.     Current Outpatient Medications   Medication Sig Dispense Refill    tamsulosin (FLOMAX) 0.4 MG capsule Take 1 capsule by mouth daily 30 capsule 0    predniSONE (DELTASONE) 20 MG tablet Take 1 tablet by mouth 2 times daily for 5 days 10 tablet 0    albuterol sulfate HFA (PROVENTIL;VENTOLIN;PROAIR) 108 (90 Base) MCG/ACT inhaler Inhale 2 puffs into the lungs every 4 hours as needed for Wheezing or Shortness of Breath 18 g 0    levoFLOXacin (LEVAQUIN) 500 MG tablet Take 1 tablet by mouth daily for 7 days 7 tablet 0    diatrizoate meglumine-sodium (GASTROGRAFIN) 66-10 % solution Take 30 mLs by mouth ONCE PRN for Other Indications: for radiation port planning purposes Take by mouth once. 30 mL 0    ARIPiprazole (ABILIFY) 5 MG tablet Take 5 mg by mouth daily      budesonide-formoterol (SYMBICORT) 160-4.5 MCG/ACT AERO Inhale 2 puffs into the lungs 2 times daily      cloNIDine (CATAPRES) 0.1 MG tablet Take 0.1 mg by mouth 2 times daily      dabigatran (PRADAXA) 150 MG capsule Take 150 mg by mouth in the morning and 150 mg in the evening.      DULoxetine (CYMBALTA) 20 MG extended release capsule 20 mg      guaiFENesin (MUCINEX) 600 MG extended release tablet Take 600 mg by mouth 2 times daily      ipratropium-albuterol (DUONEB) 0.5-2.5 (3) MG/3ML SOLN nebulizer solution Inhale 3 mLs into the lungs every 6 hours as needed      omeprazole (PRILOSEC) 20 MG delayed release capsule Take 1 capsule by

## 2024-04-29 ENCOUNTER — HOSPITAL ENCOUNTER (OUTPATIENT)
Facility: HOSPITAL | Age: 74
Setting detail: RECURRING SERIES
Discharge: HOME | End: 2024-05-02
Attending: RADIOLOGY
Payer: OTHER GOVERNMENT

## 2024-04-29 LAB
RAD ONC ARIA COURSE FIRST TREATMENT DATE: NORMAL
RAD ONC ARIA COURSE ID: NORMAL
RAD ONC ARIA COURSE INTENT: NORMAL
RAD ONC ARIA COURSE LAST TREATMENT DATE: NORMAL
RAD ONC ARIA COURSE SESSION NUMBER: 28
RAD ONC ARIA COURSE START DATE: NORMAL
RAD ONC ARIA COURSE TREATMENT ELAPSED DAYS: 41
RAD ONC ARIA PLAN FRACTIONS TREATED TO DATE: 28
RAD ONC ARIA PLAN ID: NORMAL
RAD ONC ARIA PLAN PRESCRIBED DOSE PER FRACTION: 2.5 GY
RAD ONC ARIA PLAN PRIMARY REFERENCE POINT: NORMAL
RAD ONC ARIA PLAN TOTAL FRACTIONS PRESCRIBED: 28
RAD ONC ARIA PLAN TOTAL PRESCRIBED DOSE: 7000 CGY
RAD ONC ARIA REFERENCE POINT DOSAGE GIVEN TO DATE: 70 GY
RAD ONC ARIA REFERENCE POINT ID: NORMAL
RAD ONC ARIA REFERENCE POINT SESSION DOSAGE GIVEN: 2.5 GY

## 2024-04-29 PROCEDURE — 77014 CHG CT GUIDANCE RADIATION THERAPY FLDS PLACEMENT: CPT | Performed by: RADIOLOGY

## 2024-04-29 PROCEDURE — 77385 HC NTSTY MODUL RAD TX DLVR SMPL: CPT

## 2024-04-30 ENCOUNTER — APPOINTMENT (OUTPATIENT)
Facility: HOSPITAL | Age: 74
End: 2024-04-30
Attending: RADIOLOGY
Payer: OTHER GOVERNMENT

## 2024-05-04 ENCOUNTER — HOSPITAL ENCOUNTER (EMERGENCY)
Facility: HOSPITAL | Age: 74
Discharge: HOME | End: 2024-05-04
Attending: EMERGENCY MEDICINE
Payer: OTHER GOVERNMENT

## 2024-05-04 VITALS
OXYGEN SATURATION: 100 % | DIASTOLIC BLOOD PRESSURE: 78 MMHG | TEMPERATURE: 97.4 F | SYSTOLIC BLOOD PRESSURE: 116 MMHG | HEART RATE: 97 BPM | BODY MASS INDEX: 27.83 KG/M2 | RESPIRATION RATE: 14 BRPM | HEIGHT: 73 IN | WEIGHT: 210 LBS

## 2024-05-04 DIAGNOSIS — R30.0 DYSURIA: ICD-10-CM

## 2024-05-04 DIAGNOSIS — N42.82 PROSTATITIS SYNDROME: Primary | ICD-10-CM

## 2024-05-04 LAB
APPEARANCE UR: CLEAR
BACTERIA URNS QL MICRO: NEGATIVE /HPF
BILIRUB UR QL: NEGATIVE
COLOR UR: ABNORMAL
EPITH CASTS URNS QL MICRO: NORMAL /LPF (ref 0–5)
GLUCOSE UR STRIP.AUTO-MCNC: NEGATIVE MG/DL
HGB UR QL STRIP: NEGATIVE
KETONES UR QL STRIP.AUTO: NEGATIVE MG/DL
LEUKOCYTE ESTERASE UR QL STRIP.AUTO: ABNORMAL
NITRITE UR QL STRIP.AUTO: NEGATIVE
PH UR STRIP: 6 (ref 5–8)
PROT UR STRIP-MCNC: NEGATIVE MG/DL
RBC #/AREA URNS HPF: NEGATIVE /HPF (ref 0–5)
SP GR UR REFRACTOMETRY: 1.02 (ref 1–1.03)
UROBILINOGEN UR QL STRIP.AUTO: 1 EU/DL (ref 0.2–1)
WBC URNS QL MICRO: NORMAL /HPF (ref 0–4)

## 2024-05-04 PROCEDURE — 81001 URINALYSIS AUTO W/SCOPE: CPT

## 2024-05-04 PROCEDURE — 99283 EMERGENCY DEPT VISIT LOW MDM: CPT

## 2024-05-04 PROCEDURE — 87086 URINE CULTURE/COLONY COUNT: CPT

## 2024-05-04 RX ORDER — LEVOFLOXACIN 500 MG/1
500 TABLET, FILM COATED ORAL DAILY
Qty: 10 TABLET | Refills: 0 | Status: SHIPPED | OUTPATIENT
Start: 2024-05-04 | End: 2024-05-14

## 2024-05-04 ASSESSMENT — LIFESTYLE VARIABLES
HOW OFTEN DO YOU HAVE A DRINK CONTAINING ALCOHOL: NEVER
HOW MANY STANDARD DRINKS CONTAINING ALCOHOL DO YOU HAVE ON A TYPICAL DAY: PATIENT DOES NOT DRINK

## 2024-05-04 ASSESSMENT — ENCOUNTER SYMPTOMS
ABDOMINAL PAIN: 0
EYES NEGATIVE: 1
CHEST TIGHTNESS: 0

## 2024-05-04 ASSESSMENT — PAIN - FUNCTIONAL ASSESSMENT: PAIN_FUNCTIONAL_ASSESSMENT: 0-10

## 2024-05-04 ASSESSMENT — PAIN SCALES - GENERAL: PAINLEVEL_OUTOF10: 9

## 2024-05-04 NOTE — ED NOTES
Patient up for discharge. Verbal and written discharge instructions given and patient verbalized understanding of

## 2024-05-04 NOTE — DISCHARGE INSTRUCTIONS
Your urine shows evidence of some inflammation but no clear infection after taking antibiotics for the last week, will give you a prolonged course of antibiotics as I suspect you have prostatitis which will give you those urinary symptoms.  Please continue your current medications and follow-up closely with your primary provider as well as urology [place], and please return if you are at all worsened or concerned.

## 2024-05-04 NOTE — ED PROVIDER NOTES
EMERGENCY DEPARTMENT HISTORY AND PHYSICAL EXAM    9:08 AM      Date: 5/4/2024  Patient Name: Waqas De Leon    History of Presenting Illness     Chief Complaint   Patient presents with    Urinary Pain       History From: Patient    Waqas De Leon is a 73 y.o. male   Patient is a 73-year-old male with a history of depression, diastolic heart failure, COPD, AAA, prostate cancer with radiation the presents emergency department complaint of difficulty urinating for the last 3 to 4 days.  The patient said he was treated last week for COPD exacerbation and had some mild urinary symptoms embolism progressively worsens.  Patient said he was feeling better with his cough but the urinary symptoms continue to worsen.  Patient denies any nausea, vomiting, flank pain, fevers, chills, or numbness or tingling.  The patient said that he had a catheter in place in the past year for several months and that is now been removed.  Patient denies any other new medications.  Patient is not a current smoker, drinker, and has a history of cocaine use.  Patient denies working at this time.           Nursing Notes were all reviewed and agreed with or any disagreements were addressed in the HPI.    PCP: Edilia Gonzalez MD    No current facility-administered medications for this encounter.     Current Outpatient Medications   Medication Sig Dispense Refill    levoFLOXacin (LEVAQUIN) 500 MG tablet Take 1 tablet by mouth daily for 10 days 10 tablet 0    tamsulosin (FLOMAX) 0.4 MG capsule Take 1 capsule by mouth daily 30 capsule 0    albuterol sulfate HFA (PROVENTIL;VENTOLIN;PROAIR) 108 (90 Base) MCG/ACT inhaler Inhale 2 puffs into the lungs every 4 hours as needed for Wheezing or Shortness of Breath 18 g 0    diatrizoate meglumine-sodium (GASTROGRAFIN) 66-10 % solution Take 30 mLs by mouth ONCE PRN for Other Indications: for radiation port planning purposes Take by mouth once. (Patient not taking: Reported on 5/4/2024) 30 mL 0     past 12 hour(s))   Urinalysis    Collection Time: 05/04/24  7:47 AM   Result Value Ref Range    Color, UA DARK YELLOW      Appearance CLEAR      Specific Gravity, UA 1.020 1.005 - 1.030      pH, Urine 6.0 5.0 - 8.0      Protein, UA Negative NEG mg/dL    Glucose, Ur Negative NEG mg/dL    Ketones, Urine Negative NEG mg/dL    Bilirubin, Urine Negative NEG      Blood, Urine Negative NEG      Urobilinogen, Urine 1.0 0.2 - 1.0 EU/dL    Nitrite, Urine Negative NEG      Leukocyte Esterase, Urine TRACE (A) NEG     Urinalysis, Micro    Collection Time: 05/04/24  7:47 AM   Result Value Ref Range    WBC, UA 0 to 3 0 - 4 /hpf    RBC, UA Negative 0 - 5 /hpf    Epithelial Cells UA FEW 0 - 5 /lpf    BACTERIA, URINE Negative NEG /hpf       Radiologic Studies -   No orders to display         Medical Decision Making   I am the first provider for this patient.    I reviewed the vital signs, available nursing notes, past medical history, past surgical history, family history and social history.    Vital Signs-Reviewed the patient's vital signs.        ED Course: Progress Notes, Reevaluation, and Consults:    Provider Notes (Medical Decision Making):     MDM  Number of Diagnoses or Management Options  Dysuria  Prostatitis syndrome  Diagnosis management comments: Patient is a 73-year-old male with a history of COPD, cardiovascular disease, prostate cancer with radiation treatment, 7.3 cm infrarenal AAA with endovascular repair, presents emergency department with difficulty urinating.  The patient has a nontender abdomen, appears to have completed a week of Levaquin given to him on 4/27/24 after being evaluated for some respiratory symptoms with addition of Flomax but is not had significant improvement.  Patient's urine appears to show no evidence of infection and his postvoid residual urine was just over 100 mLs.  Will follow patient's urinalysis and then reevaluate.         Patient has no obstructive process, and has trace leuk esterase,

## 2024-05-05 LAB
BACTERIA SPEC CULT: NORMAL
SERVICE CMNT-IMP: NORMAL

## 2024-05-10 LAB
EKG ATRIAL RATE: 118 BPM
EKG DIAGNOSIS: NORMAL
EKG P-R INTERVAL: 148 MS
EKG Q-T INTERVAL: 328 MS
EKG QRS DURATION: 82 MS
EKG QTC CALCULATION (BAZETT): 459 MS
EKG R AXIS: -29 DEGREES
EKG T AXIS: 80 DEGREES
EKG VENTRICULAR RATE: 118 BPM

## 2024-06-18 ENCOUNTER — HOSPITAL ENCOUNTER (OUTPATIENT)
Facility: HOSPITAL | Age: 74
Setting detail: RECURRING SERIES
Discharge: HOME | End: 2024-06-21
Attending: RADIOLOGY
Payer: OTHER GOVERNMENT

## 2024-06-18 PROCEDURE — 99024 POSTOP FOLLOW-UP VISIT: CPT | Performed by: RADIOLOGY

## 2024-06-18 PROCEDURE — 99213 OFFICE O/P EST LOW 20 MIN: CPT

## 2024-07-01 DIAGNOSIS — C61 MALIGNANT NEOPLASM OF PROSTATE (HCC): Primary | ICD-10-CM

## 2024-11-30 ENCOUNTER — HOSPITAL ENCOUNTER (INPATIENT)
Facility: HOSPITAL | Age: 74
LOS: 1 days | Discharge: HOME | DRG: 192 | End: 2024-12-01
Attending: STUDENT IN AN ORGANIZED HEALTH CARE EDUCATION/TRAINING PROGRAM | Admitting: STUDENT IN AN ORGANIZED HEALTH CARE EDUCATION/TRAINING PROGRAM
Payer: MEDICARE

## 2024-11-30 ENCOUNTER — APPOINTMENT (OUTPATIENT)
Facility: HOSPITAL | Age: 74
DRG: 192 | End: 2024-11-30
Payer: MEDICARE

## 2024-11-30 DIAGNOSIS — J44.1 COPD EXACERBATION (HCC): Primary | ICD-10-CM

## 2024-11-30 LAB
ALBUMIN SERPL-MCNC: 3.1 G/DL (ref 3.4–5)
ALBUMIN/GLOB SERPL: 0.8 (ref 0.8–1.7)
ALP SERPL-CCNC: 81 U/L (ref 45–117)
ALT SERPL-CCNC: 27 U/L (ref 16–61)
AMPHET UR QL SCN: NEGATIVE
ANION GAP SERPL CALC-SCNC: 6 MMOL/L (ref 3–18)
AST SERPL-CCNC: 24 U/L (ref 10–38)
B PERT DNA SPEC QL NAA+PROBE: NOT DETECTED
BARBITURATES UR QL SCN: NEGATIVE
BASE EXCESS BLD CALC-SCNC: 0.2 MMOL/L
BASE EXCESS BLD CALC-SCNC: 2.1 MMOL/L
BASOPHILS # BLD: 0.1 K/UL (ref 0–0.1)
BASOPHILS NFR BLD: 1 % (ref 0–2)
BENZODIAZ UR QL: NEGATIVE
BILIRUB SERPL-MCNC: 0.8 MG/DL (ref 0.2–1)
BORDETELLA PARAPERTUSSIS BY PCR: NOT DETECTED
BUN SERPL-MCNC: 15 MG/DL (ref 7–18)
BUN/CREAT SERPL: 15 (ref 12–20)
C PNEUM DNA SPEC QL NAA+PROBE: NOT DETECTED
CALCIUM SERPL-MCNC: 8.6 MG/DL (ref 8.5–10.1)
CANNABINOIDS UR QL SCN: NEGATIVE
CHLORIDE SERPL-SCNC: 111 MMOL/L (ref 100–111)
CO2 SERPL-SCNC: 27 MMOL/L (ref 21–32)
COCAINE UR QL SCN: POSITIVE
CREAT SERPL-MCNC: 0.97 MG/DL (ref 0.6–1.3)
DIFFERENTIAL METHOD BLD: ABNORMAL
EKG ATRIAL RATE: 90 BPM
EKG DIAGNOSIS: NORMAL
EKG P AXIS: 87 DEGREES
EKG P-R INTERVAL: 140 MS
EKG Q-T INTERVAL: 390 MS
EKG QRS DURATION: 92 MS
EKG QTC CALCULATION (BAZETT): 477 MS
EKG R AXIS: -10 DEGREES
EKG T AXIS: 70 DEGREES
EKG VENTRICULAR RATE: 90 BPM
EOSINOPHIL # BLD: 0.5 K/UL (ref 0–0.4)
EOSINOPHIL NFR BLD: 8 % (ref 0–5)
ERYTHROCYTE [DISTWIDTH] IN BLOOD BY AUTOMATED COUNT: 18 % (ref 11.6–14.5)
FLUAV RNA SPEC QL NAA+PROBE: NOT DETECTED
FLUAV SUBTYP SPEC NAA+PROBE: NOT DETECTED
FLUBV RNA SPEC QL NAA+PROBE: NOT DETECTED
FLUBV RNA SPEC QL NAA+PROBE: NOT DETECTED
GLOBULIN SER CALC-MCNC: 4 G/DL (ref 2–4)
GLUCOSE SERPL-MCNC: 106 MG/DL (ref 74–99)
HADV DNA SPEC QL NAA+PROBE: NOT DETECTED
HCO3 BLD-SCNC: 25.7 MMOL/L (ref 21–28)
HCO3 BLD-SCNC: 29.4 MMOL/L (ref 21–28)
HCOV 229E RNA SPEC QL NAA+PROBE: NOT DETECTED
HCOV HKU1 RNA SPEC QL NAA+PROBE: NOT DETECTED
HCOV NL63 RNA SPEC QL NAA+PROBE: NOT DETECTED
HCOV OC43 RNA SPEC QL NAA+PROBE: NOT DETECTED
HCT VFR BLD AUTO: 37.1 % (ref 36–48)
HGB BLD-MCNC: 11.7 G/DL (ref 13–16)
HMPV RNA SPEC QL NAA+PROBE: NOT DETECTED
HPIV1 RNA SPEC QL NAA+PROBE: NOT DETECTED
HPIV2 RNA SPEC QL NAA+PROBE: NOT DETECTED
HPIV3 RNA SPEC QL NAA+PROBE: NOT DETECTED
HPIV4 RNA SPEC QL NAA+PROBE: NOT DETECTED
IMM GRANULOCYTES # BLD AUTO: 0 K/UL (ref 0–0.04)
IMM GRANULOCYTES NFR BLD AUTO: 0 % (ref 0–0.5)
LACTATE BLD-SCNC: 1.78 MMOL/L (ref 0.4–2)
LYMPHOCYTES # BLD: 1.6 K/UL (ref 0.9–3.6)
LYMPHOCYTES NFR BLD: 28 % (ref 21–52)
Lab: ABNORMAL
M PNEUMO DNA SPEC QL NAA+PROBE: NOT DETECTED
MAGNESIUM SERPL-MCNC: 2 MG/DL (ref 1.6–2.6)
MCH RBC QN AUTO: 28.8 PG (ref 24–34)
MCHC RBC AUTO-ENTMCNC: 31.5 G/DL (ref 31–37)
MCV RBC AUTO: 91.4 FL (ref 78–100)
METHADONE UR QL: NEGATIVE
MONOCYTES # BLD: 0.5 K/UL (ref 0.05–1.2)
MONOCYTES NFR BLD: 8 % (ref 3–10)
NEUTS SEG # BLD: 3.2 K/UL (ref 1.8–8)
NEUTS SEG NFR BLD: 55 % (ref 40–73)
NRBC # BLD: 0 K/UL (ref 0–0.01)
NRBC BLD-RTO: 0 PER 100 WBC
NT PRO BNP: 60 PG/ML (ref 0–900)
OPIATES UR QL: NEGATIVE
PCO2 BLD: 43.9 MMHG (ref 35–48)
PCO2 BLD: 57.2 MMHG (ref 35–48)
PCP UR QL: NEGATIVE
PH BLD: 7.32 (ref 7.35–7.45)
PH BLD: 7.38 (ref 7.35–7.45)
PLATELET # BLD AUTO: 151 K/UL (ref 135–420)
PMV BLD AUTO: 10.1 FL (ref 9.2–11.8)
PO2 BLD: 206 MMHG (ref 83–108)
PO2 BLD: 27 MMHG (ref 83–108)
POTASSIUM SERPL-SCNC: 3.4 MMOL/L (ref 3.5–5.5)
PROT SERPL-MCNC: 7.1 G/DL (ref 6.4–8.2)
RBC # BLD AUTO: 4.06 M/UL (ref 4.35–5.65)
RSV RNA SPEC QL NAA+PROBE: NOT DETECTED
RV+EV RNA SPEC QL NAA+PROBE: NOT DETECTED
SAO2 % BLD: 43.7 % (ref 92–97)
SAO2 % BLD: 99.7 % (ref 92–97)
SARS-COV-2 RNA RESP QL NAA+PROBE: NOT DETECTED
SARS-COV-2 RNA RESP QL NAA+PROBE: NOT DETECTED
SERVICE CMNT-IMP: ABNORMAL
SERVICE CMNT-IMP: ABNORMAL
SODIUM SERPL-SCNC: 144 MMOL/L (ref 136–145)
SOURCE: NORMAL
SPECIMEN TYPE: ABNORMAL
SPECIMEN TYPE: ABNORMAL
TROPONIN I SERPL HS-MCNC: 6 NG/L (ref 0–78)
WBC # BLD AUTO: 5.9 K/UL (ref 4.6–13.2)

## 2024-11-30 PROCEDURE — 2700000000 HC OXYGEN THERAPY PER DAY

## 2024-11-30 PROCEDURE — 2500000003 HC RX 250 WO HCPCS: Performed by: STUDENT IN AN ORGANIZED HEALTH CARE EDUCATION/TRAINING PROGRAM

## 2024-11-30 PROCEDURE — 6370000000 HC RX 637 (ALT 250 FOR IP): Performed by: STUDENT IN AN ORGANIZED HEALTH CARE EDUCATION/TRAINING PROGRAM

## 2024-11-30 PROCEDURE — 83880 ASSAY OF NATRIURETIC PEPTIDE: CPT

## 2024-11-30 PROCEDURE — 87636 SARSCOV2 & INF A&B AMP PRB: CPT

## 2024-11-30 PROCEDURE — 84484 ASSAY OF TROPONIN QUANT: CPT

## 2024-11-30 PROCEDURE — 6360000002 HC RX W HCPCS: Performed by: STUDENT IN AN ORGANIZED HEALTH CARE EDUCATION/TRAINING PROGRAM

## 2024-11-30 PROCEDURE — 94761 N-INVAS EAR/PLS OXIMETRY MLT: CPT

## 2024-11-30 PROCEDURE — 99285 EMERGENCY DEPT VISIT HI MDM: CPT

## 2024-11-30 PROCEDURE — 96366 THER/PROPH/DIAG IV INF ADDON: CPT

## 2024-11-30 PROCEDURE — 82803 BLOOD GASES ANY COMBINATION: CPT

## 2024-11-30 PROCEDURE — 96365 THER/PROPH/DIAG IV INF INIT: CPT

## 2024-11-30 PROCEDURE — 93005 ELECTROCARDIOGRAM TRACING: CPT | Performed by: EMERGENCY MEDICINE

## 2024-11-30 PROCEDURE — 2580000003 HC RX 258: Performed by: STUDENT IN AN ORGANIZED HEALTH CARE EDUCATION/TRAINING PROGRAM

## 2024-11-30 PROCEDURE — 0202U NFCT DS 22 TRGT SARS-COV-2: CPT

## 2024-11-30 PROCEDURE — 85025 COMPLETE CBC W/AUTO DIFF WBC: CPT

## 2024-11-30 PROCEDURE — 80307 DRUG TEST PRSMV CHEM ANLYZR: CPT

## 2024-11-30 PROCEDURE — 2140000001 HC CVICU INTERMEDIATE R&B

## 2024-11-30 PROCEDURE — 83605 ASSAY OF LACTIC ACID: CPT

## 2024-11-30 PROCEDURE — 96375 TX/PRO/DX INJ NEW DRUG ADDON: CPT

## 2024-11-30 PROCEDURE — 80053 COMPREHEN METABOLIC PANEL: CPT

## 2024-11-30 PROCEDURE — 71045 X-RAY EXAM CHEST 1 VIEW: CPT

## 2024-11-30 PROCEDURE — 83735 ASSAY OF MAGNESIUM: CPT

## 2024-11-30 RX ORDER — ALBUTEROL SULFATE 0.83 MG/ML
5 SOLUTION RESPIRATORY (INHALATION)
Status: COMPLETED | OUTPATIENT
Start: 2024-11-30 | End: 2024-11-30

## 2024-11-30 RX ORDER — POTASSIUM CHLORIDE 1500 MG/1
40 TABLET, EXTENDED RELEASE ORAL PRN
Status: DISCONTINUED | OUTPATIENT
Start: 2024-11-30 | End: 2024-12-01 | Stop reason: HOSPADM

## 2024-11-30 RX ORDER — POTASSIUM CHLORIDE 7.45 MG/ML
10 INJECTION INTRAVENOUS PRN
Status: DISCONTINUED | OUTPATIENT
Start: 2024-11-30 | End: 2024-12-01 | Stop reason: HOSPADM

## 2024-11-30 RX ORDER — PREDNISONE 20 MG/1
40 TABLET ORAL DAILY
Status: DISCONTINUED | OUTPATIENT
Start: 2024-12-01 | End: 2024-12-01 | Stop reason: HOSPADM

## 2024-11-30 RX ORDER — MAGNESIUM SULFATE IN WATER 40 MG/ML
2000 INJECTION, SOLUTION INTRAVENOUS PRN
Status: DISCONTINUED | OUTPATIENT
Start: 2024-11-30 | End: 2024-12-01 | Stop reason: HOSPADM

## 2024-11-30 RX ORDER — POLYETHYLENE GLYCOL 3350 17 G/17G
17 POWDER, FOR SOLUTION ORAL DAILY PRN
Status: DISCONTINUED | OUTPATIENT
Start: 2024-11-30 | End: 2024-12-01 | Stop reason: HOSPADM

## 2024-11-30 RX ORDER — TAMSULOSIN HYDROCHLORIDE 0.4 MG/1
0.8 CAPSULE ORAL DAILY
Status: DISCONTINUED | OUTPATIENT
Start: 2024-12-01 | End: 2024-12-01 | Stop reason: HOSPADM

## 2024-11-30 RX ORDER — PANTOPRAZOLE SODIUM 20 MG/1
20 TABLET, DELAYED RELEASE ORAL
Status: DISCONTINUED | OUTPATIENT
Start: 2024-12-01 | End: 2024-12-01 | Stop reason: HOSPADM

## 2024-11-30 RX ORDER — DULOXETIN HYDROCHLORIDE 20 MG/1
20 CAPSULE, DELAYED RELEASE ORAL DAILY
Status: DISCONTINUED | OUTPATIENT
Start: 2024-11-30 | End: 2024-11-30

## 2024-11-30 RX ORDER — ONDANSETRON 2 MG/ML
4 INJECTION INTRAMUSCULAR; INTRAVENOUS EVERY 6 HOURS PRN
Status: DISCONTINUED | OUTPATIENT
Start: 2024-11-30 | End: 2024-12-01 | Stop reason: HOSPADM

## 2024-11-30 RX ORDER — ACETAMINOPHEN 325 MG/1
650 TABLET ORAL EVERY 6 HOURS PRN
Status: DISCONTINUED | OUTPATIENT
Start: 2024-11-30 | End: 2024-12-01 | Stop reason: HOSPADM

## 2024-11-30 RX ORDER — SODIUM CHLORIDE 0.9 % (FLUSH) 0.9 %
5-40 SYRINGE (ML) INJECTION EVERY 12 HOURS SCHEDULED
Status: DISCONTINUED | OUTPATIENT
Start: 2024-11-30 | End: 2024-12-01 | Stop reason: HOSPADM

## 2024-11-30 RX ORDER — GUAIFENESIN 200 MG/10ML
200 LIQUID ORAL EVERY 4 HOURS PRN
Status: DISCONTINUED | OUTPATIENT
Start: 2024-11-30 | End: 2024-12-01 | Stop reason: HOSPADM

## 2024-11-30 RX ORDER — ALBUTEROL SULFATE 90 UG/1
2 INHALANT RESPIRATORY (INHALATION) EVERY 4 HOURS PRN
Status: DISCONTINUED | OUTPATIENT
Start: 2024-11-30 | End: 2024-12-01 | Stop reason: HOSPADM

## 2024-11-30 RX ORDER — ENOXAPARIN SODIUM 100 MG/ML
40 INJECTION SUBCUTANEOUS DAILY
Status: DISCONTINUED | OUTPATIENT
Start: 2024-11-30 | End: 2024-11-30

## 2024-11-30 RX ORDER — IPRATROPIUM BROMIDE AND ALBUTEROL SULFATE 2.5; .5 MG/3ML; MG/3ML
2 SOLUTION RESPIRATORY (INHALATION)
Status: COMPLETED | OUTPATIENT
Start: 2024-11-30 | End: 2024-11-30

## 2024-11-30 RX ORDER — MAGNESIUM SULFATE IN WATER 40 MG/ML
2000 INJECTION, SOLUTION INTRAVENOUS
Status: COMPLETED | OUTPATIENT
Start: 2024-11-30 | End: 2024-11-30

## 2024-11-30 RX ORDER — TIOTROPIUM BROMIDE 18 UG/1
18 CAPSULE ORAL; RESPIRATORY (INHALATION) DAILY
COMMUNITY

## 2024-11-30 RX ORDER — IPRATROPIUM BROMIDE AND ALBUTEROL SULFATE 2.5; .5 MG/3ML; MG/3ML
1 SOLUTION RESPIRATORY (INHALATION)
Status: DISCONTINUED | OUTPATIENT
Start: 2024-11-30 | End: 2024-11-30

## 2024-11-30 RX ORDER — SODIUM CHLORIDE 0.9 % (FLUSH) 0.9 %
5-40 SYRINGE (ML) INJECTION PRN
Status: DISCONTINUED | OUTPATIENT
Start: 2024-11-30 | End: 2024-12-01 | Stop reason: HOSPADM

## 2024-11-30 RX ORDER — 0.9 % SODIUM CHLORIDE 0.9 %
1000 INTRAVENOUS SOLUTION INTRAVENOUS ONCE
Status: DISCONTINUED | OUTPATIENT
Start: 2024-11-30 | End: 2024-11-30

## 2024-11-30 RX ORDER — ONDANSETRON 4 MG/1
4 TABLET, ORALLY DISINTEGRATING ORAL EVERY 8 HOURS PRN
Status: DISCONTINUED | OUTPATIENT
Start: 2024-11-30 | End: 2024-12-01 | Stop reason: HOSPADM

## 2024-11-30 RX ORDER — BENZONATATE 100 MG/1
200 CAPSULE ORAL 3 TIMES DAILY PRN
Status: DISCONTINUED | OUTPATIENT
Start: 2024-11-30 | End: 2024-12-01 | Stop reason: HOSPADM

## 2024-11-30 RX ORDER — ARIPIPRAZOLE 2 MG/1
5 TABLET ORAL DAILY
Status: DISCONTINUED | OUTPATIENT
Start: 2024-11-30 | End: 2024-11-30

## 2024-11-30 RX ORDER — ACETAMINOPHEN 650 MG/1
650 SUPPOSITORY RECTAL EVERY 6 HOURS PRN
Status: DISCONTINUED | OUTPATIENT
Start: 2024-11-30 | End: 2024-12-01 | Stop reason: HOSPADM

## 2024-11-30 RX ORDER — DABIGATRAN ETEXILATE 150 MG/1
150 CAPSULE ORAL EVERY 12 HOURS
Status: DISCONTINUED | OUTPATIENT
Start: 2024-11-30 | End: 2024-11-30

## 2024-11-30 RX ORDER — SODIUM CHLORIDE 9 MG/ML
INJECTION, SOLUTION INTRAVENOUS PRN
Status: DISCONTINUED | OUTPATIENT
Start: 2024-11-30 | End: 2024-12-01 | Stop reason: HOSPADM

## 2024-11-30 RX ADMIN — ALBUTEROL SULFATE 5 MG: 2.5 SOLUTION RESPIRATORY (INHALATION) at 10:54

## 2024-11-30 RX ADMIN — APIXABAN 5 MG: 5 TABLET, FILM COATED ORAL at 20:33

## 2024-11-30 RX ADMIN — SODIUM CHLORIDE, PRESERVATIVE FREE 10 ML: 5 INJECTION INTRAVENOUS at 20:09

## 2024-11-30 RX ADMIN — ARFORMOTEROL TARTRATE: 15 SOLUTION RESPIRATORY (INHALATION) at 21:07

## 2024-11-30 RX ADMIN — WATER 40 MG: 1 INJECTION INTRAMUSCULAR; INTRAVENOUS; SUBCUTANEOUS at 07:16

## 2024-11-30 RX ADMIN — GUAIFENESIN 200 MG: 200 SOLUTION ORAL at 20:31

## 2024-11-30 RX ADMIN — MAGNESIUM SULFATE IN WATER 2000 MG: 40 INJECTION, SOLUTION INTRAVENOUS at 07:25

## 2024-11-30 RX ADMIN — BENZONATATE 200 MG: 100 CAPSULE ORAL at 22:40

## 2024-11-30 RX ADMIN — POTASSIUM BICARBONATE 20 MEQ: 782 TABLET, EFFERVESCENT ORAL at 11:20

## 2024-11-30 RX ADMIN — IPRATROPIUM BROMIDE AND ALBUTEROL SULFATE 2 DOSE: .5; 3 SOLUTION RESPIRATORY (INHALATION) at 07:16

## 2024-11-30 RX ADMIN — WATER 1000 MG: 1 INJECTION INTRAMUSCULAR; INTRAVENOUS; SUBCUTANEOUS at 17:23

## 2024-11-30 RX ADMIN — ACETAMINOPHEN 325MG 650 MG: 325 TABLET ORAL at 20:30

## 2024-11-30 RX ADMIN — ALBUTEROL SULFATE 5 MG: 2.5 SOLUTION RESPIRATORY (INHALATION) at 08:24

## 2024-11-30 RX ADMIN — ALBUTEROL SULFATE 2 PUFF: 90 AEROSOL, METERED RESPIRATORY (INHALATION) at 22:39

## 2024-11-30 ASSESSMENT — PAIN SCALES - GENERAL
PAINLEVEL_OUTOF10: 0
PAINLEVEL_OUTOF10: 0
PAINLEVEL_OUTOF10: 4
PAINLEVEL_OUTOF10: 0
PAINLEVEL_OUTOF10: 0
PAINLEVEL_OUTOF10: 8
PAINLEVEL_OUTOF10: 0
PAINLEVEL_OUTOF10: 0

## 2024-11-30 ASSESSMENT — PAIN DESCRIPTION - LOCATION
LOCATION: CHEST
LOCATION: GENERALIZED

## 2024-11-30 ASSESSMENT — PAIN - FUNCTIONAL ASSESSMENT
PAIN_FUNCTIONAL_ASSESSMENT: NONE - DENIES PAIN
PAIN_FUNCTIONAL_ASSESSMENT: 0-10
PAIN_FUNCTIONAL_ASSESSMENT: ACTIVITIES ARE NOT PREVENTED

## 2024-11-30 ASSESSMENT — PAIN DESCRIPTION - DESCRIPTORS: DESCRIPTORS: ACHING;DISCOMFORT

## 2024-11-30 ASSESSMENT — LIFESTYLE VARIABLES
HOW MANY STANDARD DRINKS CONTAINING ALCOHOL DO YOU HAVE ON A TYPICAL DAY: PATIENT DOES NOT DRINK
HOW OFTEN DO YOU HAVE A DRINK CONTAINING ALCOHOL: NEVER

## 2024-11-30 NOTE — ED PROVIDER NOTES
7.32 (*)     POC pCO2 57.2 (*)     POC PO2 27 (*)     POC HCO3 29.4 (*)     POC O2 SAT 43.7 (*)     All other components within normal limits   POC G3: BLOOD GASES INCLUDES CALC. TCO2, HCO3, BASE EXCESS, SO2 - Abnormal; Notable for the following components:    POC PO2 206 (*)     POC O2 SAT 99.7 (*)     All other components within normal limits   COVID-19 & INFLUENZA COMBO   MAGNESIUM   BRAIN NATRIURETIC PEPTIDE   TROPONIN       All other labs were within normal range or not returned as of this dictation.    EMERGENCY DEPARTMENT COURSE and DIFFERENTIAL DIAGNOSIS/MDM:   Vitals:    Vitals:    11/30/24 0856 11/30/24 0913 11/30/24 0934 11/30/24 0949   BP: 114/67 114/67 99/68 105/73   Pulse: 83 81 75 68   Resp: 23 22 17 16   Temp:       TempSrc:       SpO2: 99% 100% 100% 100%   Weight:       Height:           Medical Decision Making  Amount and/or Complexity of Data Reviewed  Labs: ordered.  Radiology: ordered.    Risk  Prescription drug management.      Patient is a 74-year-old male who presents with shortness of breath.  Patient describes his symptoms similar to 2 previous episodes of COPD exacerbation and he does have diffuse wheezing on exam however due to his age and other risk factors I would still like to rule out pneumonia, heart failure and ACS as a cause of his shortness of breath.  No concerns for PE or dissection at this time.  He will be treated with magnesium, steroids and albuterol treatments.    EKG and chest x-ray are within normal limits.    Flu and COVID are negative.    CBC, CMP, BMP, troponin and magnesium are within normal limits.    VBG is obtained and shows an elevated CO2    Patient not feeling better after treatment.  Given additional round of albuterol but discussed the possibility of putting him on BiPAP especially with his elevated CO2 on his VBG.  He is amenable to this at this time.    Still tight and wheezing.  Patient placed on BiPAP.    On reexamination patient is now feeling a little

## 2024-11-30 NOTE — PLAN OF CARE
Problem: Chronic Conditions and Co-morbidities  Goal: Patient's chronic conditions and co-morbidity symptoms are monitored and maintained or improved  Outcome: Progressing  Flowsheets (Taken 11/30/2024 [id number])  Care Plan - Patient's Chronic Conditions and Co-Morbidity Symptoms are Monitored and Maintained or Improved:   Monitor and assess patient's chronic conditions and comorbid symptoms for stability, deterioration, or improvement   Collaborate with multidisciplinary team to address chronic and comorbid conditions and prevent exacerbation or deterioration

## 2024-11-30 NOTE — H&P
History and Physical          Subjective     Chief Complaint   Patient presents with    Shortness of Breath    Cough     HPI: Waqas De Leon is a 74 y.o. male with a PMHx alcohol/cocaine abuse, COPD (no home oxygen use), DVT on Eliquis, hypertension, depression, ALEXANDER on CPAPwho presented to the [facility] ED with complaints of shortness of breath intermittent for the past few days.  Patient reports he has a extensive history of COPD with no home oxygen use at baseline.  He reports nonproductive cough, he does not smoke drink or use drugs.  Denies sick contacts, sore throat upper respiratory symptoms, chest or abdominal pain, nausea or emesis.  He reports he is compliant with his home medications however patient is unclear which medications he takes when attempted to verify with him.  While in the emergency department patient received 3 breathing treatments, placed on BiPAP for presumed increased work of breathing.  ABG performed without hypercapnia or hypoxia.  Chest x-ray revealed stable hyperinflation.  COVID/flu no lab work notable for hypokalemia at 3.4.  Patient was transferred to [facility] for further evaluation and management of COPD exacerbation.    PMHx:  Past Medical History:   Diagnosis Date    Alcohol abuse     Anxiety     Asthma     Bronchitis     Chronic deep vein thrombosis (DVT) ([facility]) 07/2013 07/2013, 12/2014 ([facility])    Cocaine abuse ([facility])     COPD (chronic obstructive pulmonary disease) ([facility])     Depression     GERD (gastroesophageal reflux disease)     Hyperlipidemia     Hypertension     Major depression     Mild diastolic dysfunction     ALEXANDER on CPAP     Renal insufficiency     Suicidal ideation     Vitamin D deficiency        PSurgHx:  Past Surgical History:   Procedure Laterality Date    OTHER SURGICAL HISTORY  06/2018    ENDOVASCULAR ANEURYSM REPAIR    TOTAL HIP ARTHROPLASTY  08/2010       SocialHx:  Social History     Socioeconomic History    Marital status: Single   Tobacco Use     No orders from Dr. Tee in [facility] at time of pread call.

## 2024-11-30 NOTE — ED TRIAGE NOTES
Patient arrived  to ER with complaints of shortness of breath for past 3-4 days, worse today. Using albuterol and its just not helping,  Hx COPD denies any fevers at home.

## 2024-12-01 VITALS
RESPIRATION RATE: 20 BRPM | OXYGEN SATURATION: 96 % | SYSTOLIC BLOOD PRESSURE: 108 MMHG | TEMPERATURE: 98.2 F | HEART RATE: 83 BPM | BODY MASS INDEX: 26.3 KG/M2 | DIASTOLIC BLOOD PRESSURE: 77 MMHG | HEIGHT: 73 IN | WEIGHT: 198.41 LBS

## 2024-12-01 LAB
ANION GAP SERPL CALC-SCNC: 6 MMOL/L (ref 3–18)
BASOPHILS # BLD: 0 K/UL (ref 0–0.1)
BASOPHILS NFR BLD: 0 % (ref 0–2)
BUN SERPL-MCNC: 18 MG/DL (ref 7–18)
BUN/CREAT SERPL: 21 (ref 12–20)
CALCIUM SERPL-MCNC: 8.5 MG/DL (ref 8.5–10.1)
CHLORIDE SERPL-SCNC: 112 MMOL/L (ref 100–111)
CO2 SERPL-SCNC: 25 MMOL/L (ref 21–32)
CREAT SERPL-MCNC: 0.85 MG/DL (ref 0.6–1.3)
DIFFERENTIAL METHOD BLD: ABNORMAL
EOSINOPHIL # BLD: 0 K/UL (ref 0–0.4)
EOSINOPHIL NFR BLD: 1 % (ref 0–5)
ERYTHROCYTE [DISTWIDTH] IN BLOOD BY AUTOMATED COUNT: 17.8 % (ref 11.6–14.5)
FOLATE SERPL-MCNC: 19.1 NG/ML (ref 3.1–17.5)
GLUCOSE SERPL-MCNC: 147 MG/DL (ref 74–99)
HCT VFR BLD AUTO: 30.6 % (ref 36–48)
HGB BLD-MCNC: 9.4 G/DL (ref 13–16)
IMM GRANULOCYTES # BLD AUTO: 0 K/UL (ref 0–0.04)
IMM GRANULOCYTES NFR BLD AUTO: 0 % (ref 0–0.5)
IRON SATN MFR SERPL: 13 % (ref 20–50)
IRON SERPL-MCNC: 28 UG/DL (ref 50–175)
LYMPHOCYTES # BLD: 1 K/UL (ref 0.9–3.6)
LYMPHOCYTES NFR BLD: 13 % (ref 21–52)
MCH RBC QN AUTO: 28.6 PG (ref 24–34)
MCHC RBC AUTO-ENTMCNC: 30.7 G/DL (ref 31–37)
MCV RBC AUTO: 93 FL (ref 78–100)
MONOCYTES # BLD: 0.6 K/UL (ref 0.05–1.2)
MONOCYTES NFR BLD: 8 % (ref 3–10)
NEUTS SEG # BLD: 6.3 K/UL (ref 1.8–8)
NEUTS SEG NFR BLD: 79 % (ref 40–73)
NRBC # BLD: 0 K/UL (ref 0–0.01)
NRBC BLD-RTO: 0 PER 100 WBC
PHOSPHATE SERPL-MCNC: 1.7 MG/DL (ref 2.5–4.9)
PLATELET # BLD AUTO: 145 K/UL (ref 135–420)
PMV BLD AUTO: 10.2 FL (ref 9.2–11.8)
POTASSIUM SERPL-SCNC: 4 MMOL/L (ref 3.5–5.5)
RBC # BLD AUTO: 3.29 M/UL (ref 4.35–5.65)
SODIUM SERPL-SCNC: 143 MMOL/L (ref 136–145)
TIBC SERPL-MCNC: 213 UG/DL (ref 250–450)
VIT B12 SERPL-MCNC: 346 PG/ML (ref 211–911)
WBC # BLD AUTO: 8 K/UL (ref 4.6–13.2)

## 2024-12-01 PROCEDURE — 2580000003 HC RX 258: Performed by: STUDENT IN AN ORGANIZED HEALTH CARE EDUCATION/TRAINING PROGRAM

## 2024-12-01 PROCEDURE — 6360000002 HC RX W HCPCS: Performed by: STUDENT IN AN ORGANIZED HEALTH CARE EDUCATION/TRAINING PROGRAM

## 2024-12-01 PROCEDURE — 82607 VITAMIN B-12: CPT

## 2024-12-01 PROCEDURE — 6370000000 HC RX 637 (ALT 250 FOR IP): Performed by: STUDENT IN AN ORGANIZED HEALTH CARE EDUCATION/TRAINING PROGRAM

## 2024-12-01 PROCEDURE — 2700000000 HC OXYGEN THERAPY PER DAY

## 2024-12-01 PROCEDURE — 83550 IRON BINDING TEST: CPT

## 2024-12-01 PROCEDURE — 94640 AIRWAY INHALATION TREATMENT: CPT

## 2024-12-01 PROCEDURE — 85025 COMPLETE CBC W/AUTO DIFF WBC: CPT

## 2024-12-01 PROCEDURE — 84100 ASSAY OF PHOSPHORUS: CPT

## 2024-12-01 PROCEDURE — 82746 ASSAY OF FOLIC ACID SERUM: CPT

## 2024-12-01 PROCEDURE — 80048 BASIC METABOLIC PNL TOTAL CA: CPT

## 2024-12-01 PROCEDURE — 94761 N-INVAS EAR/PLS OXIMETRY MLT: CPT

## 2024-12-01 PROCEDURE — 83540 ASSAY OF IRON: CPT

## 2024-12-01 PROCEDURE — 36415 COLL VENOUS BLD VENIPUNCTURE: CPT

## 2024-12-01 PROCEDURE — 2500000003 HC RX 250 WO HCPCS: Performed by: STUDENT IN AN ORGANIZED HEALTH CARE EDUCATION/TRAINING PROGRAM

## 2024-12-01 RX ORDER — ARFORMOTEROL TARTRATE 15 UG/2ML
15 SOLUTION RESPIRATORY (INHALATION)
Status: DISCONTINUED | OUTPATIENT
Start: 2024-12-01 | End: 2024-12-01 | Stop reason: HOSPADM

## 2024-12-01 RX ORDER — PREDNISONE 20 MG/1
40 TABLET ORAL DAILY
Qty: 8 TABLET | Refills: 0 | Status: SHIPPED | OUTPATIENT
Start: 2024-12-02 | End: 2024-12-06

## 2024-12-01 RX ORDER — FERROUS SULFATE 325(65) MG
325 TABLET, DELAYED RELEASE (ENTERIC COATED) ORAL EVERY OTHER DAY
Qty: 30 TABLET | Refills: 0 | Status: SHIPPED | OUTPATIENT
Start: 2024-12-01

## 2024-12-01 RX ORDER — ALBUTEROL SULFATE 90 UG/1
2 INHALANT RESPIRATORY (INHALATION) EVERY 4 HOURS PRN
Qty: 18 G | Refills: 1 | Status: SHIPPED | OUTPATIENT
Start: 2024-12-01

## 2024-12-01 RX ORDER — BUDESONIDE 0.25 MG/2ML
0.25 INHALANT ORAL
Status: DISCONTINUED | OUTPATIENT
Start: 2024-12-01 | End: 2024-12-01 | Stop reason: HOSPADM

## 2024-12-01 RX ORDER — BENZONATATE 200 MG/1
200 CAPSULE ORAL 3 TIMES DAILY PRN
Qty: 21 CAPSULE | Refills: 0 | Status: SHIPPED | OUTPATIENT
Start: 2024-12-01 | End: 2024-12-08

## 2024-12-01 RX ORDER — DOXYCYCLINE HYCLATE 100 MG
100 TABLET ORAL 2 TIMES DAILY
Qty: 8 TABLET | Refills: 0 | Status: SHIPPED | OUTPATIENT
Start: 2024-12-02 | End: 2024-12-06

## 2024-12-01 RX ADMIN — PREDNISONE 40 MG: 20 TABLET ORAL at 08:28

## 2024-12-01 RX ADMIN — ARFORMOTEROL TARTRATE: 15 SOLUTION RESPIRATORY (INHALATION) at 08:49

## 2024-12-01 RX ADMIN — POTASSIUM PHOSPHATE, MONOBASIC POTASSIUM PHOSPHATE, DIBASIC 20 MMOL: 224; 236 INJECTION, SOLUTION, CONCENTRATE INTRAVENOUS at 10:15

## 2024-12-01 RX ADMIN — TAMSULOSIN HYDROCHLORIDE 0.8 MG: 0.4 CAPSULE ORAL at 08:28

## 2024-12-01 RX ADMIN — APIXABAN 5 MG: 5 TABLET, FILM COATED ORAL at 08:28

## 2024-12-01 RX ADMIN — SODIUM CHLORIDE, PRESERVATIVE FREE 10 ML: 5 INJECTION INTRAVENOUS at 09:58

## 2024-12-01 RX ADMIN — TIOTROPIUM BROMIDE INHALATION SPRAY 2 PUFF: 3.12 SPRAY, METERED RESPIRATORY (INHALATION) at 10:15

## 2024-12-01 RX ADMIN — PANTOPRAZOLE SODIUM 20 MG: 20 TABLET, DELAYED RELEASE ORAL at 05:17

## 2024-12-01 ASSESSMENT — PAIN SCALES - GENERAL
PAINLEVEL_OUTOF10: 0

## 2024-12-01 NOTE — DISCHARGE INSTRUCTIONS
Prednisone, Doxycycline as instructed.   Continue home inhalers  STOP using cocaine  Follow with your PCP.  Start taking iron for your iron deficiency, encouraged to discuss colonoscopy with primary care provider.

## 2024-12-01 NOTE — DISCHARGE SUMMARY
21 (H) 12 - 20      Est, Glom Filt Rate >90 >60 ml/min/1.73m2    Calcium 8.5 8.5 - 10.1 MG/DL   Phosphorus    Collection Time: 12/01/24  4:07 AM   Result Value Ref Range    Phosphorus 1.7 (L) 2.5 - 4.9 MG/DL   Iron and TIBC    Collection Time: 12/01/24  4:07 AM   Result Value Ref Range    Iron 28 (L) 50 - 175 ug/dL    TIBC 213 (L) 250 - 450 ug/dL    Iron % Saturation 13 (L) 20 - 50 %   Vitamin B12 & Folate    Collection Time: 12/01/24  4:07 AM   Result Value Ref Range    Vitamin B-12 346 211 - 911 pg/mL    Folate 19.1 (H) 3.10 - 17.50 ng/mL       XR CHEST PORTABLE    Result Date: 11/30/2024  EXAM:  CHEST portable 0744 hours CLINICAL HISTORY/INDICATION: sob x3 to 4 days acutely worsening today    Additional: None  COMPARISON: 4/27/2024. TECHNIQUE: Single AP view FINDINGS: LINES/TUBES/DEVICES: None. HEART AND MEDIASTINUM: No appreciable cardiomegaly. Stable mild tortuosity of the aortic arch and descending aorta. LUNGS AND PLEURAL SPACES: Hyperinflated. No consolidation, mass, or effusion. No pneumothorax. BONY THORAX AND SOFT TISSUES: No significant findings.      Stable hyperinflation/emphysema Electronically signed by Delores Mccord      Procedures:  None    Discharge Medications:     Current Discharge Medication List        START taking these medications    Details   predniSONE (DELTASONE) 20 MG tablet Take 2 tablets by mouth daily for 4 days  Qty: 8 tablet, Refills: 0      benzonatate (TESSALON) 200 MG capsule Take 1 capsule by mouth 3 times daily as needed for Cough  Qty: 21 capsule, Refills: 0           CONTINUE these medications which have CHANGED    Details   albuterol sulfate HFA (PROVENTIL;VENTOLIN;PROAIR) 108 (90 Base) MCG/ACT inhaler Inhale 2 puffs into the lungs every 4 hours as needed for Wheezing or Shortness of Breath  Qty: 18 g, Refills: 1           CONTINUE these medications which have NOT CHANGED    Details   apixaban (ELIQUIS) 5 MG TABS tablet Take 1 tablet by mouth 2 times daily      tiotropium

## 2024-12-01 NOTE — CARE COORDINATION
12/01/24 1338   Service Assessment   Patient Orientation Alert and Oriented   Cognition Alert   History Provided By Patient   Primary Caregiver Self   Accompanied By/Relationship no one at this time.   Support Systems Family Members   Patient's Healthcare Decision Maker is: Legal Next of Kin   PCP Verified by CM Yes  (Dr. Adilson Jack at [facility])   Last Visit to PCP Within last 3 months   Prior Functional Level Independent in ADLs/IADLs   Current Functional Level Independent in ADLs/IADLs   Can patient return to prior living arrangement Yes   Ability to make needs known: Good   Family able to assist with home care needs: Yes   Would you like for me to discuss the discharge plan with any other family members/significant others, and if so, who? Yes  (sister Zoraiad)   Financial Resources Medicare;Other (Comment)   Social/Functional History   Lives With Family   Type of Home House   Home Layout One level   Home Access Stairs to enter with rails   Entrance Stairs - Number of Steps 3 steps   Bathroom Shower/Tub Tub/Shower unit   Bathroom Toilet Standard   Bathroom Equipment None   Bathroom Accessibility Accessible   Home Equipment None   Receives Help From Family   ADL Assistance Independent   Homemaking Assistance Independent   Homemaking Responsibilities Yes   Ambulation Assistance Independent   Transfer Assistance Independent   Active  Yes   Mode of Transportation Car   Occupation Retired   Discharge Planning   Type of Residence House   Living Arrangements Family Members   Current Services Prior To Admission None   Potential Assistance Needed N/A   DME Ordered? No   Potential Assistance Purchasing Medications No   Type of Home Care Services None   Patient expects to be discharged to: House   One/Two Story Residence One story   History of falls? 0   Services At/After Discharge   Transition of Care Consult (CM Consult) Discharge Planning   Services At/After Discharge None   [place] Resource Information

## 2024-12-01 NOTE — CARE COORDINATION
Discharge order noted for today. Orders received. No needs identified at this time. Case management remains available as needed.     Patient admits disappointment in himself that he completed a 2 year drug program and then went back to doing cocaine. States he knows that he can't do drugs anymore.    Patient states that his sister will transport him home at time of discharge.    Negra VIDESN,RN, [facility]  Case Management  [phone]

## 2024-12-01 NOTE — PROGRESS NOTES
Bedside and Verbal shift change report given to uQang Willis RN   (oncoming nurse) by Taz RN (offgoing nurse). Report included the following information Nurse Handoff Report, Index, MAR, Recent Results, and Cardiac Rhythm SR .     
DC instruction given to pt.  PIV and tele mon removed.  Belongings with pt.  
did, they were given  2330 assisted to bedside commode another BM soft and formed  0020 vitals reassessed with pain and needs, given water and some crackers per request  0130 vitals wnl, pt sleeping  0200 5 ps reassessed  0300 5 ps reassessed  0400 5 ps reassessed  0500 vitals pain and needs reassessed, given ice water, used bedside commode, monahan not leaking as much as it did last night.  0645 used perfect serve to Dr. Klein to inform of phosphorus 1.7 awaiting orders